# Patient Record
Sex: FEMALE | Race: WHITE | NOT HISPANIC OR LATINO | Employment: OTHER | URBAN - METROPOLITAN AREA
[De-identification: names, ages, dates, MRNs, and addresses within clinical notes are randomized per-mention and may not be internally consistent; named-entity substitution may affect disease eponyms.]

---

## 2017-02-06 ENCOUNTER — HOSPITAL ENCOUNTER (INPATIENT)
Facility: HOSPITAL | Age: 82
LOS: 3 days | Discharge: HOME | DRG: 871 | End: 2017-02-09
Attending: EMERGENCY MEDICINE | Admitting: INTERNAL MEDICINE
Payer: MEDICARE

## 2017-02-06 DIAGNOSIS — D61.818 PANCYTOPENIA: ICD-10-CM

## 2017-02-06 DIAGNOSIS — J18.9 SEPSIS DUE TO PNEUMONIA: ICD-10-CM

## 2017-02-06 DIAGNOSIS — I50.20 SYSTOLIC CONGESTIVE HEART FAILURE: ICD-10-CM

## 2017-02-06 DIAGNOSIS — E86.0 DEHYDRATION: ICD-10-CM

## 2017-02-06 DIAGNOSIS — I48.19 PERSISTENT ATRIAL FIBRILLATION: ICD-10-CM

## 2017-02-06 DIAGNOSIS — I95.9 HYPOTENSION, UNSPECIFIED HYPOTENSION TYPE: ICD-10-CM

## 2017-02-06 DIAGNOSIS — I50.32 CHRONIC DIASTOLIC HEART FAILURE: ICD-10-CM

## 2017-02-06 DIAGNOSIS — R50.9 ACUTE FEBRILE ILLNESS: Primary | ICD-10-CM

## 2017-02-06 DIAGNOSIS — D50.8 IRON DEFICIENCY ANEMIA SECONDARY TO INADEQUATE DIETARY IRON INTAKE: ICD-10-CM

## 2017-02-06 DIAGNOSIS — N17.9 AKI (ACUTE KIDNEY INJURY): ICD-10-CM

## 2017-02-06 DIAGNOSIS — A41.9 SEPSIS DUE TO PNEUMONIA: ICD-10-CM

## 2017-02-06 LAB
ALBUMIN SERPL BCP-MCNC: 3 G/DL
ALP SERPL-CCNC: 86 U/L
ALT SERPL W/O P-5'-P-CCNC: 16 U/L
ANION GAP SERPL CALC-SCNC: 9 MMOL/L
AST SERPL-CCNC: 25 U/L
BASOPHILS # BLD AUTO: 0 K/UL
BASOPHILS NFR BLD: 0 %
BILIRUB SERPL-MCNC: 0.5 MG/DL
BILIRUB UR QL STRIP: NEGATIVE
BUN SERPL-MCNC: 43 MG/DL
CALCIUM SERPL-MCNC: 7.7 MG/DL
CHLORIDE SERPL-SCNC: 102 MMOL/L
CHOLEST/HDLC SERPL: 4 {RATIO}
CLARITY UR: CLEAR
CO2 SERPL-SCNC: 26 MMOL/L
COLOR UR: YELLOW
CREAT SERPL-MCNC: 1.4 MG/DL
CREAT UR-MCNC: 79 MG/DL
DIASTOLIC DYSFUNCTION: YES
DIFFERENTIAL METHOD: ABNORMAL
EOSINOPHIL # BLD AUTO: 0 K/UL
EOSINOPHIL NFR BLD: 0 %
ERYTHROCYTE [DISTWIDTH] IN BLOOD BY AUTOMATED COUNT: 15.2 %
EST. GFR  (AFRICAN AMERICAN): 41 ML/MIN/1.73 M^2
EST. GFR  (NON AFRICAN AMERICAN): 36 ML/MIN/1.73 M^2
ESTIMATED AVG GLUCOSE: 85 MG/DL
ESTIMATED PA SYSTOLIC PRESSURE: 45.25
FERRITIN SERPL-MCNC: 194 NG/ML
FLUAV AG SPEC QL IA: NEGATIVE
FLUBV AG SPEC QL IA: NEGATIVE
FOLATE SERPL-MCNC: 16.4 NG/ML
GLUCOSE SERPL-MCNC: 106 MG/DL
GLUCOSE UR QL STRIP: NEGATIVE
GRAM STN SPEC: NORMAL
HBA1C MFR BLD HPLC: 4.6 %
HCT VFR BLD AUTO: 31.9 %
HDL/CHOLESTEROL RATIO: 25 %
HDLC SERPL-MCNC: 100 MG/DL
HDLC SERPL-MCNC: 25 MG/DL
HGB BLD-MCNC: 10.4 G/DL
HGB UR QL STRIP: NEGATIVE
INR PPP: 2
IRON SERPL-MCNC: 16 UG/DL
KETONES UR QL STRIP: ABNORMAL
LACTATE SERPL-SCNC: 0.6 MMOL/L
LDLC SERPL CALC-MCNC: 50.6 MG/DL
LEUKOCYTE ESTERASE UR QL STRIP: NEGATIVE
LYMPHOCYTES # BLD AUTO: 0.7 K/UL
LYMPHOCYTES NFR BLD: 19.2 %
MAGNESIUM SERPL-MCNC: 2.1 MG/DL
MCH RBC QN AUTO: 31.7 PG
MCHC RBC AUTO-ENTMCNC: 32.6 %
MCV RBC AUTO: 97 FL
MITRAL VALVE REGURGITATION: ABNORMAL
MONOCYTES # BLD AUTO: 0.5 K/UL
MONOCYTES NFR BLD: 12.2 %
NEUTROPHILS # BLD AUTO: 2.5 K/UL
NEUTROPHILS NFR BLD: 68.3 %
NITRITE UR QL STRIP: NEGATIVE
NONHDLC SERPL-MCNC: 75 MG/DL
PH UR STRIP: 6 [PH] (ref 5–8)
PHOSPHATE SERPL-MCNC: 3.9 MG/DL
PLATELET # BLD AUTO: 140 K/UL
PMV BLD AUTO: 9.8 FL
POTASSIUM SERPL-SCNC: 3.9 MMOL/L
PROT SERPL-MCNC: 5.7 G/DL
PROT UR QL STRIP: NEGATIVE
PROTHROMBIN TIME: 21.2 SEC
RBC # BLD AUTO: 3.28 M/UL
RETIRED EF AND QEF - SEE NOTES: 60 (ref 55–65)
SATURATED IRON: 5 %
SODIUM SERPL-SCNC: 137 MMOL/L
SODIUM UR-SCNC: <20 MMOL/L
SP GR UR STRIP: 1.01 (ref 1–1.03)
SPECIMEN SOURCE: NORMAL
TOTAL IRON BINDING CAPACITY: 332 UG/DL
TRANSFERRIN SERPL-MCNC: 224 MG/DL
TRICUSPID VALVE REGURGITATION: ABNORMAL
TRIGL SERPL-MCNC: 122 MG/DL
TSH SERPL DL<=0.005 MIU/L-ACNC: 0.6 UIU/ML
URN SPEC COLLECT METH UR: ABNORMAL
UROBILINOGEN UR STRIP-ACNC: NEGATIVE EU/DL
UUN UR-MCNC: 510 MG/DL
VIT B12 SERPL-MCNC: 209 PG/ML
WBC # BLD AUTO: 3.69 K/UL

## 2017-02-06 PROCEDURE — 87040 BLOOD CULTURE FOR BACTERIA: CPT

## 2017-02-06 PROCEDURE — 25000003 PHARM REV CODE 250: Performed by: STUDENT IN AN ORGANIZED HEALTH CARE EDUCATION/TRAINING PROGRAM

## 2017-02-06 PROCEDURE — 83036 HEMOGLOBIN GLYCOSYLATED A1C: CPT

## 2017-02-06 PROCEDURE — 96366 THER/PROPH/DIAG IV INF ADDON: CPT

## 2017-02-06 PROCEDURE — 99284 EMERGENCY DEPT VISIT MOD MDM: CPT | Mod: 25

## 2017-02-06 PROCEDURE — 63600175 PHARM REV CODE 636 W HCPCS: Performed by: EMERGENCY MEDICINE

## 2017-02-06 PROCEDURE — 96361 HYDRATE IV INFUSION ADD-ON: CPT

## 2017-02-06 PROCEDURE — 93306 TTE W/DOPPLER COMPLETE: CPT

## 2017-02-06 PROCEDURE — 84540 ASSAY OF URINE/UREA-N: CPT

## 2017-02-06 PROCEDURE — 82728 ASSAY OF FERRITIN: CPT

## 2017-02-06 PROCEDURE — 84300 ASSAY OF URINE SODIUM: CPT

## 2017-02-06 PROCEDURE — 85610 PROTHROMBIN TIME: CPT

## 2017-02-06 PROCEDURE — 11000001 HC ACUTE MED/SURG PRIVATE ROOM

## 2017-02-06 PROCEDURE — 25000003 PHARM REV CODE 250: Performed by: INTERNAL MEDICINE

## 2017-02-06 PROCEDURE — 80061 LIPID PANEL: CPT

## 2017-02-06 PROCEDURE — 87086 URINE CULTURE/COLONY COUNT: CPT

## 2017-02-06 PROCEDURE — 85025 COMPLETE CBC W/AUTO DIFF WBC: CPT

## 2017-02-06 PROCEDURE — 96365 THER/PROPH/DIAG IV INF INIT: CPT

## 2017-02-06 PROCEDURE — 87070 CULTURE OTHR SPECIMN AEROBIC: CPT

## 2017-02-06 PROCEDURE — 94761 N-INVAS EAR/PLS OXIMETRY MLT: CPT

## 2017-02-06 PROCEDURE — 83605 ASSAY OF LACTIC ACID: CPT

## 2017-02-06 PROCEDURE — 94640 AIRWAY INHALATION TREATMENT: CPT

## 2017-02-06 PROCEDURE — 82746 ASSAY OF FOLIC ACID SERUM: CPT

## 2017-02-06 PROCEDURE — 80053 COMPREHEN METABOLIC PANEL: CPT

## 2017-02-06 PROCEDURE — 25000242 PHARM REV CODE 250 ALT 637 W/ HCPCS: Performed by: STUDENT IN AN ORGANIZED HEALTH CARE EDUCATION/TRAINING PROGRAM

## 2017-02-06 PROCEDURE — 82607 VITAMIN B-12: CPT

## 2017-02-06 PROCEDURE — 96367 TX/PROPH/DG ADDL SEQ IV INF: CPT

## 2017-02-06 PROCEDURE — 87400 INFLUENZA A/B EACH AG IA: CPT | Mod: 59

## 2017-02-06 PROCEDURE — 84100 ASSAY OF PHOSPHORUS: CPT

## 2017-02-06 PROCEDURE — 81003 URINALYSIS AUTO W/O SCOPE: CPT

## 2017-02-06 PROCEDURE — 82570 ASSAY OF URINE CREATININE: CPT

## 2017-02-06 PROCEDURE — 83735 ASSAY OF MAGNESIUM: CPT

## 2017-02-06 PROCEDURE — 27000221 HC OXYGEN, UP TO 24 HOURS

## 2017-02-06 PROCEDURE — 63600175 PHARM REV CODE 636 W HCPCS: Performed by: STUDENT IN AN ORGANIZED HEALTH CARE EDUCATION/TRAINING PROGRAM

## 2017-02-06 PROCEDURE — 83540 ASSAY OF IRON: CPT

## 2017-02-06 PROCEDURE — 87205 SMEAR GRAM STAIN: CPT

## 2017-02-06 PROCEDURE — 87205 SMEAR GRAM STAIN: CPT | Mod: 91

## 2017-02-06 PROCEDURE — 84443 ASSAY THYROID STIM HORMONE: CPT

## 2017-02-06 PROCEDURE — 25000003 PHARM REV CODE 250: Performed by: EMERGENCY MEDICINE

## 2017-02-06 RX ORDER — CIPROFLOXACIN 2 MG/ML
400 INJECTION, SOLUTION INTRAVENOUS
Status: COMPLETED | OUTPATIENT
Start: 2017-02-06 | End: 2017-02-06

## 2017-02-06 RX ORDER — SODIUM CHLORIDE 9 MG/ML
1000 INJECTION, SOLUTION INTRAVENOUS
Status: COMPLETED | OUTPATIENT
Start: 2017-02-06 | End: 2017-02-06

## 2017-02-06 RX ORDER — ASPIRIN 81 MG/1
81 TABLET ORAL DAILY
Status: DISCONTINUED | OUTPATIENT
Start: 2017-02-06 | End: 2017-02-09 | Stop reason: HOSPADM

## 2017-02-06 RX ORDER — HYDROXYZINE PAMOATE 25 MG/1
25 CAPSULE ORAL ONCE
Status: COMPLETED | OUTPATIENT
Start: 2017-02-06 | End: 2017-02-06

## 2017-02-06 RX ORDER — IPRATROPIUM BROMIDE AND ALBUTEROL SULFATE 2.5; .5 MG/3ML; MG/3ML
3 SOLUTION RESPIRATORY (INHALATION) EVERY 4 HOURS PRN
Status: DISCONTINUED | OUTPATIENT
Start: 2017-02-06 | End: 2017-02-08

## 2017-02-06 RX ORDER — CALCIUM CARBONATE 500(1250)
500 TABLET ORAL
Status: DISCONTINUED | OUTPATIENT
Start: 2017-02-06 | End: 2017-02-09 | Stop reason: HOSPADM

## 2017-02-06 RX ORDER — ACETAMINOPHEN 500 MG
1000 TABLET ORAL
Status: COMPLETED | OUTPATIENT
Start: 2017-02-06 | End: 2017-02-06

## 2017-02-06 RX ORDER — TRAMADOL HYDROCHLORIDE 50 MG/1
50 TABLET ORAL EVERY 8 HOURS PRN
Status: DISCONTINUED | OUTPATIENT
Start: 2017-02-06 | End: 2017-02-09 | Stop reason: HOSPADM

## 2017-02-06 RX ORDER — FUROSEMIDE 10 MG/ML
40 INJECTION INTRAMUSCULAR; INTRAVENOUS ONCE
Status: COMPLETED | OUTPATIENT
Start: 2017-02-06 | End: 2017-02-06

## 2017-02-06 RX ORDER — CIPROFLOXACIN 2 MG/ML
400 INJECTION, SOLUTION INTRAVENOUS
Status: DISCONTINUED | OUTPATIENT
Start: 2017-02-06 | End: 2017-02-06 | Stop reason: DRUGHIGH

## 2017-02-06 RX ORDER — OSELTAMIVIR PHOSPHATE 30 MG/1
30 CAPSULE ORAL DAILY
Status: DISCONTINUED | OUTPATIENT
Start: 2017-02-06 | End: 2017-02-09 | Stop reason: HOSPADM

## 2017-02-06 RX ORDER — BENZONATATE 100 MG/1
100 CAPSULE ORAL 3 TIMES DAILY PRN
Status: DISCONTINUED | OUTPATIENT
Start: 2017-02-06 | End: 2017-02-09 | Stop reason: HOSPADM

## 2017-02-06 RX ORDER — SODIUM CHLORIDE FOR INHALATION 3 %
4 VIAL, NEBULIZER (ML) INHALATION ONCE
Status: DISCONTINUED | OUTPATIENT
Start: 2017-02-06 | End: 2017-02-09 | Stop reason: HOSPADM

## 2017-02-06 RX ORDER — WARFARIN 3 MG/1
3 TABLET ORAL DAILY
Status: DISCONTINUED | OUTPATIENT
Start: 2017-02-06 | End: 2017-02-09 | Stop reason: HOSPADM

## 2017-02-06 RX ORDER — ONDANSETRON 8 MG/1
8 TABLET, ORALLY DISINTEGRATING ORAL EVERY 8 HOURS PRN
Status: DISCONTINUED | OUTPATIENT
Start: 2017-02-06 | End: 2017-02-09 | Stop reason: HOSPADM

## 2017-02-06 RX ORDER — OSELTAMIVIR PHOSPHATE 75 MG/1
75 CAPSULE ORAL 2 TIMES DAILY
Status: DISCONTINUED | OUTPATIENT
Start: 2017-02-06 | End: 2017-02-06 | Stop reason: DRUGHIGH

## 2017-02-06 RX ORDER — CIPROFLOXACIN 2 MG/ML
400 INJECTION, SOLUTION INTRAVENOUS
Status: DISCONTINUED | OUTPATIENT
Start: 2017-02-07 | End: 2017-02-08

## 2017-02-06 RX ADMIN — CIPROFLOXACIN 400 MG: 2 INJECTION, SOLUTION INTRAVENOUS at 06:02

## 2017-02-06 RX ADMIN — SODIUM CHLORIDE 500 ML: 0.9 INJECTION, SOLUTION INTRAVENOUS at 10:02

## 2017-02-06 RX ADMIN — ASPIRIN 81 MG: 81 TABLET, COATED ORAL at 10:02

## 2017-02-06 RX ADMIN — IPRATROPIUM BROMIDE AND ALBUTEROL SULFATE 3 ML: .5; 3 SOLUTION RESPIRATORY (INHALATION) at 08:02

## 2017-02-06 RX ADMIN — HYDROXYZINE PAMOATE 25 MG: 25 CAPSULE ORAL at 07:02

## 2017-02-06 RX ADMIN — FUROSEMIDE 40 MG: 10 INJECTION, SOLUTION INTRAMUSCULAR; INTRAVENOUS at 04:02

## 2017-02-06 RX ADMIN — SODIUM CHLORIDE 1000 ML: 0.9 INJECTION, SOLUTION INTRAVENOUS at 01:02

## 2017-02-06 RX ADMIN — PIPERACILLIN SODIUM AND TAZOBACTAM SODIUM 4.5 G: 4; .5 INJECTION, POWDER, FOR SOLUTION INTRAVENOUS at 06:02

## 2017-02-06 RX ADMIN — PIPERACILLIN SODIUM AND TAZOBACTAM SODIUM 4.5 G: 4; .5 INJECTION, POWDER, FOR SOLUTION INTRAVENOUS at 07:02

## 2017-02-06 RX ADMIN — SODIUM CHLORIDE 1000 ML: 0.9 INJECTION, SOLUTION INTRAVENOUS at 06:02

## 2017-02-06 RX ADMIN — CALCIUM 500 MG: 500 TABLET ORAL at 04:02

## 2017-02-06 RX ADMIN — SODIUM CHLORIDE 1000 ML: 0.9 INJECTION, SOLUTION INTRAVENOUS at 03:02

## 2017-02-06 RX ADMIN — OSELTAMIVIR PHOSPHATE 30 MG: 30 CAPSULE ORAL at 12:02

## 2017-02-06 RX ADMIN — TRAMADOL HYDROCHLORIDE 50 MG: 50 TABLET, COATED ORAL at 04:02

## 2017-02-06 RX ADMIN — WARFARIN SODIUM 3 MG: 3 TABLET ORAL at 04:02

## 2017-02-06 RX ADMIN — VANCOMYCIN HYDROCHLORIDE 1000 MG: 1 INJECTION, POWDER, LYOPHILIZED, FOR SOLUTION INTRAVENOUS at 08:02

## 2017-02-06 RX ADMIN — ACETAMINOPHEN 1000 MG: 500 TABLET ORAL at 03:02

## 2017-02-06 RX ADMIN — BENZONATATE 100 MG: 100 CAPSULE ORAL at 04:02

## 2017-02-06 NOTE — PT/OT/SLP PROGRESS
Physical Therapy      Jazmín Bishop  MRN: [id number]    Patient not seen today secondary to Patient unwilling to participate, Patient fatigue. Will follow-up 2/6/2017.    Avery Kwan, PT

## 2017-02-06 NOTE — ED PROVIDER NOTES
Encounter Date: 2/6/2017       History     Chief Complaint   Patient presents with    Abdominal Pain     sent from [facility] for eval of abdominal distention with an episode of vomting yesterday. also has had cough. reported fever x2 days.     Cough     Review of patient's allergies indicates:   Allergen Reactions    Codeine sulfate Other (See Comments)     CONFUSION       HPI Comments: 81F resident of [facility] was brought in by EMS for fever.  Pt states she thought she had fever x 2 days but they kept telling her she didn't.  Tonight they woke her up and told her she was going to the ER b/c she had fever.  She has no complaints.  She has a very wet mucusy cough.  She denies abd pain, n/v/d or dysuria.  She states she wears a diaper b/c she always has to urinate.  She is hard of hearing.  Paperwork sent with pt states she is DNR.    The history is provided by the patient.     Past Medical History   Diagnosis Date    Arthritis     Cataract     CHF (congestive heart failure)     Chicken pox     Hypertension     Ulcer      Past Medical History Pertinent Negatives   Diagnosis Date Noted    Basal cell carcinoma 8/27/2015    Melanoma 8/27/2015    Skin disease 8/27/2015    Squamous cell carcinoma 8/27/2015     Past Surgical History   Procedure Laterality Date    Pylorplasty      Hysterectomy      Cholecystectomy      Small intestine surgery      Stomach surgery      Eye surgery      Spine surgery       x2     Family History   Problem Relation Age of Onset    Kidney disease Mother     No Known Problems Father      Social History   Substance Use Topics    Smoking status: Never Smoker    Smokeless tobacco: Never Used    Alcohol use No     Review of Systems   Constitutional: Positive for fever.   Respiratory: Positive for cough. Negative for shortness of breath.    Cardiovascular: Negative for chest pain.   Gastrointestinal: Negative for abdominal pain, diarrhea, nausea and vomiting.    Genitourinary: Positive for frequency. Negative for dysuria.   All other systems reviewed and are negative.      Physical Exam   Initial Vitals   BP Pulse Resp Temp SpO2   02/06/17 0047 02/06/17 0047 02/06/17 0047 02/06/17 0047 02/06/17 0047   74/45 90 24 99.8 °F (37.7 °C) 94 %     Physical Exam    Nursing note and vitals reviewed.  Constitutional: She is cooperative.   Elderly lady, alert, awake and in no acute distress   HENT:   Head: Normocephalic and atraumatic.   Mouth/Throat: Mucous membranes are dry.   Neck: Normal range of motion.   Cardiovascular: Normal rate, regular rhythm and normal heart sounds.   No murmur heard.  Pulmonary/Chest: No respiratory distress. She has rhonchi.   Coarse breath sounds with wet cough   Abdominal: Soft. Bowel sounds are normal. She exhibits no distension. There is no tenderness.   Musculoskeletal: Normal range of motion.   Neurological: She is alert. She has normal strength.   Skin: Skin is warm and dry. No rash noted.   Psychiatric: She has a normal mood and affect. Her behavior is normal.         ED Course   Procedures  Labs Reviewed   CBC W/ AUTO DIFFERENTIAL - Abnormal; Notable for the following:        Result Value    WBC 3.69 (*)     RBC 3.28 (*)     Hemoglobin 10.4 (*)     Hematocrit 31.9 (*)     MCH 31.7 (*)     RDW 15.2 (*)     Platelets 140 (*)     Lymph # 0.7 (*)     All other components within normal limits   COMPREHENSIVE METABOLIC PANEL - Abnormal; Notable for the following:     BUN, Bld 43 (*)     Calcium 7.7 (*)     Total Protein 5.7 (*)     Albumin 3.0 (*)     eGFR if  41 (*)     eGFR if non  36 (*)     All other components within normal limits   URINALYSIS - Abnormal; Notable for the following:     Ketones, UA Trace (*)     All other components within normal limits   CULTURE, URINE   CULTURE, BLOOD   CULTURE, BLOOD   CULTURE, URINE   INFLUENZA A AND B ANTIGEN   LACTIC ACID, PLASMA          X-Rays:   Independently Interpreted  Readings:   Other Readings:  CXR - no pneumonia    Medical Decision Making:   History:   I obtained history from: someone other than patient and EMS provider.  Independently Interpreted Test(s):   I have ordered and independently interpreted X-rays - see prior notes.  Clinical Tests:   Lab Tests: Ordered and Reviewed  Radiological Study: Ordered and Reviewed  ED Management:  [facility] resident with fever.  Does not look toxic.  Alert, awake and appropriate.  No infection on exam.  Hypotensive but not tachycardic.  No AMS.  Pt given IVFs with improvement in BP.      No flu, no UTI, no pneumonia on CXR.  Labs show slightly low WBC ct, anemia but on higher end of her normal.  Low platelets.  No signs of dehydration or NGUYEN.  Normal liver enzymes.  Normal lactate.    I discussed placing central line and starting pressors - pt declines this but approves IVFs and IV abx.  She was given vanc/zosyn/cipro for fever without a source (from NH).    Will admit to [facility].  Other:   I have discussed this case with another health care provider.                   ED Course     Clinical Impression:   The primary encounter diagnosis was Acute febrile illness. Diagnoses of Dehydration and Hypotension, unspecified hypotension type were also pertinent to this visit.          Carolina Velasco MD  02/06/17 0621       Carolina Velasco MD  02/06/17 0622

## 2017-02-06 NOTE — PLAN OF CARE
Provided information on Power of  (Mr. MELISA West; Phone: [phone]). Called number provided but no answer; left message asking Mr. West to call back to nursing station by patient's room ([phone]) and to leave number and time of day best to reach.    Spoke with patient about code status, and verified with patient that she wants DNR and DNI status. When asked about want of pressors if needed for hypotension, pt stated she would only want those if absolutely necessary. Pt showed understanding of procedure and could explain procedure back when asked what it would entail. At the same time, she would still like to have the conversation with POA about the option of pressors.    UPDATE:  Contacted Mr. MELISA West (Cell: [phone]  above number is his home number) and went over code status with him and patient. Patient remains DNR/DNI with limitations:  NO intubation, mechanical ventilation, chest compressions  YES intravenous pressors  YES defibrillator and external pacing    Official DNR paperwork in patients chart.    Daniele Maloney MD  [facility] Medicine Team A  [facility] - [facility]

## 2017-02-06 NOTE — IP AVS SNAPSHOT
[facility]  [address]  [place] [address]  Phone: [phone]           Patient Discharge Instructions     Our goal is to set you up for success. This packet includes information on your condition, medications, and your home care. It will help you to care for yourself so you don't get sicker and need to go back to the hospital.     Please ask your nurse if you have any questions.        There are many details to remember when preparing to leave the hospital. Here is what you will need to do:    1. Take your medicine. If you are prescribed medications, review your Medication List in the following pages. You may have new medications to  at the pharmacy and others that you'll need to stop taking. Review the instructions for how and when to take your medications. Talk with your doctor or nurses if you are unsure of what to do.     2. Go to your follow-up appointments. Specific follow-up information is listed in the following pages. Your may be contacted by a transition nurse or clinical provider about future appointments. Be sure we have all of the phone numbers to reach you, if needed. Please contact your provider's office if you are unable to make an appointment.     3. Watch for warning signs. Your doctor or nurse will give you detailed warning signs to watch for and when to call for assistance. These instructions may also include educational information about your condition. If you experience any of warning signs to your health, call your doctor.               ** Verify the list of medication(s) below is accurate and up to date. Carry this with you in case of emergency. If your medications have changed, please notify your healthcare provider.             Medication List      START taking these medications        Additional Info                      cyanocobalamin 1000 MCG tablet   Commonly known as:  VITAMIN B-12   Quantity:  90 tablet   Refills:  3   Dose:  1000 mcg    Last time this  was given:  1,000 mcg on 2/7/2017  9:12 AM   Instructions:  Take 1 tablet (1,000 mcg total) by mouth once daily.     Begin Date    AM    Noon    PM    Bedtime       ferrous sulfate 325 (65 FE) MG EC tablet   Quantity:  270 tablet   Refills:  3   Dose:  325 mg    Instructions:  Take 1 tablet (325 mg total) by mouth 3 (three) times daily with meals.     Begin Date    AM    Noon    PM    Bedtime       moxifloxacin 400 mg tablet   Commonly known as:  AVELOX   Quantity:  3 tablet   Refills:  0   Dose:  400 mg    Instructions:  Take 1 tablet (400 mg total) by mouth once daily.     Begin Date    AM    Noon    PM    Bedtime       oseltamivir 30 MG capsule   Commonly known as:  TAMIFLU   Quantity:  1 capsule   Refills:  0   Dose:  30 mg    Last time this was given:  30 mg on 2/9/2017  8:37 AM   Instructions:  Take 1 capsule (30 mg total) by mouth once daily.     Begin Date    AM    Noon    PM    Bedtime         CONTINUE taking these medications        Additional Info                      aspirin 81 MG EC tablet   Commonly known as:  ECOTRIN   Refills:  0   Dose:  81 mg    Last time this was given:  81 mg on 2/9/2017  8:37 AM   Instructions:  Take 81 mg by mouth once daily.     Begin Date    AM    Noon    PM    Bedtime       carvedilol 25 MG tablet   Commonly known as:  COREG   Quantity:  180 tablet   Refills:  4   Dose:  25 mg    Last time this was given:  25 mg on 2/9/2017  8:37 AM   Instructions:  Take 1 tablet (25 mg total) by mouth 2 (two) times daily with meals.     Begin Date    AM    Noon    PM    Bedtime       ferrous gluconate 324 MG tablet   Commonly known as:  FERGON   Quantity:  90 tablet   Refills:  3   Dose:  324 mg    Instructions:  Take 1 tablet (324 mg total) by mouth 3 (three) times daily with meals.     Begin Date    AM    Noon    PM    Bedtime       furosemide 40 MG tablet   Commonly known as:  LASIX   Quantity:  30 tablet   Refills:  6   Dose:  40 mg    Last time this was given:  40 mg on 2/9/2017  8:37  AM   Instructions:  Take 1 tablet (40 mg total) by mouth once daily.     Begin Date    AM    Noon    PM    Bedtime       hydrOXYzine pamoate 25 MG Cap   Commonly known as:  VISTARIL   Quantity:  30 capsule   Refills:  3   Dose:  25 mg    Last time this was given:  25 mg on 2/6/2017  7:27 PM   Instructions:  Take 1 capsule (25 mg total) by mouth nightly as needed.     Begin Date    AM    Noon    PM    Bedtime       lisinopril 20 MG tablet   Commonly known as:  PRINIVIL,ZESTRIL   Quantity:  180 tablet   Refills:  5   Dose:  40 mg    Instructions:  Take 2 tablets (40 mg total) by mouth nightly.     Begin Date    AM    Noon    PM    Bedtime       methocarbamol 500 MG Tab   Commonly known as:  ROBAXIN   Quantity:  60 tablet   Refills:  3   Dose:  500 mg    Instructions:  Take 1 tablet (500 mg total) by mouth 2 (two) times daily as needed.     Begin Date    AM    Noon    PM    Bedtime       promethazine 25 MG tablet   Commonly known as:  PHENERGAN   Quantity:  30 tablet   Refills:  3   Dose:  25 mg    Instructions:  Take 1 tablet (25 mg total) by mouth daily as needed for Nausea.     Begin Date    AM    Noon    PM    Bedtime       sucralfate 1 gram tablet   Commonly known as:  CARAFATE   Refills:  0   Dose:  1 g    Instructions:  Take 1 g by mouth 2 (two) times daily.     Begin Date    AM    Noon    PM    Bedtime       tramadol 50 mg tablet   Commonly known as:  ULTRAM   Refills:  0   Dose:  50 mg    Last time this was given:  50 mg on 2/9/2017  3:00 AM   Instructions:  Take 50 mg by mouth every 8 (eight) hours as needed for Pain.     Begin Date    AM    Noon    PM    Bedtime       ursodiol 300 mg capsule   Commonly known as:  ACTIGALL   Quantity:  60 capsule   Refills:  11   Dose:  300 mg    Instructions:  Take 1 capsule (300 mg total) by mouth 2 (two) times daily.     Begin Date    AM    Noon    PM    Bedtime       warfarin 3 MG tablet   Commonly known as:  COUMADIN   Quantity:  30 tablet   Refills:  6   Dose:  3 mg     Last time this was given:  3 mg on 2/8/2017  5:28 PM   Instructions:  Take 1 tablet (3 mg total) by mouth Daily.     Begin Date    AM    Noon    PM    Bedtime            Where to Get Your Medications      You can get these medications from any pharmacy     Bring a paper prescription for each of these medications     cyanocobalamin 1000 MCG tablet    ferrous sulfate 325 (65 FE) MG EC tablet    moxifloxacin 400 mg tablet    oseltamivir 30 MG capsule                  Please bring to all follow up appointments:    1. A copy of your discharge instructions.  2. All medicines you are currently taking in their original bottles.  3. Identification and insurance card.    Please arrive 15 minutes ahead of scheduled appointment time.    Please call 24 hours in advance if you must reschedule your appointment and/or time.        Follow-up Information     Follow up with Luis Butcher MD In 1 week.    Specialty:  Family Medicine    Why:  For follow up from recent hospitalization    Contact information:    [address] [place] [address] [phone]          Discharge Instructions     Future Orders    Activity as tolerated     Call MD for:  difficulty breathing or increased cough     Call MD for:  increased confusion or weakness     Call MD for:  persistent dizziness, light-headedness, or visual disturbances     Call MD for:  persistent nausea and vomiting or diarrhea     Call MD for:  redness, tenderness, or signs of infection (pain, swelling, redness, odor or green/yellow discharge around incision site)     Call MD for:  severe persistent headache     Call MD for:  severe uncontrolled pain     Call MD for:  temperature >100.4     Call MD for:  worsening rash     Diet Cardiac     Scheduling Instructions:    Soft diet    Diet general     Questions:    Total calories:      Fat restriction, if any:      Protein restriction, if any:      Na restriction, if any:      Fluid restriction:      Additional restrictions:           Discharge Instructions       SEPSIS, UNDERSTANDING (ENGLISH) View Edit Remove  SEPSIS (ENGLISH) View Edit Remove  ADULT, PNEUMONIA (ENGLISH) View Edit Remove  PNEUMONIA, WHAT IS (ENGLISH) View Edit Remove  HEART FAILURE, COPING WITH (ENGLISH) View Edit Remove  HEART FAILURE, DISCHARGE INSTRUCTIONS FOR (ENGLISH) View Edit Remove  HEART FAILURE, WHAT IS (ENGLISH) View Edit Remove  HEART FAILURE: BEING ACTIVE (ENGLISH) View Edit Remove  HEART FAILURE: EVALUATING YOUR HEART (ENGLISH) View Edit Remove  HEART FAILURE: MAKING CHANGES TO YOUR DIET (ENGLISH) View Edit Remove  HEART FAILURE: MEDICINES TO HELP YOUR HEART (ENGLISH) View Edit Remove  HEART FAILURE: PROCEDURES THAT MAY HELP (ENGLISH) View Edit Remove  HEART FAILURE: WARNING SIGNS OF A FLARE-UP (ENGLISH) View Edit Remove  HEART FAILURE: TRACKING YOUR WEIGHT (ENGLISH) View Edit Remove  HEART FAILURE: TAKING MEDICATION TO CONTROL (ENGLISH) View Edit Remove  MOXIFLOXACIN TABLETS (ENGLISH) View Edit Remove  OSELTAMIVIR CAPSULES (ENGLISH) View Edit Remove  CYANOCOBALAMIN, PYRIDOXINE, AND FOLATE (ENGLISH) View Edit Remove  IRON TABLETS, CAPSULES, EXTENDED-RELEASE TABLETS (ENGLISH) View Edit Remove        Discharge References/Attachments     SEPSIS, UNDERSTANDING (ENGLISH)    SEPSIS (ENGLISH)    ADULT, PNEUMONIA (ENGLISH)    PNEUMONIA, WHAT IS (ENGLISH)    HEART FAILURE, COPING WITH (ENGLISH)    HEART FAILURE, DISCHARGE INSTRUCTIONS FOR (ENGLISH)    HEART FAILURE, WHAT IS (ENGLISH)    HEART FAILURE: BEING ACTIVE (ENGLISH)    HEART FAILURE: EVALUATING YOUR HEART (ENGLISH)    HEART FAILURE: MAKING CHANGES TO YOUR DIET (ENGLISH)    HEART FAILURE: MEDICINES TO HELP YOUR HEART (ENGLISH)    HEART FAILURE: PROCEDURES THAT MAY HELP (ENGLISH)    HEART FAILURE: WARNING SIGNS OF A FLARE-UP (ENGLISH)    HEART FAILURE: TRACKING YOUR WEIGHT (ENGLISH)    HEART FAILURE: TAKING MEDICATION TO CONTROL  (ENGLISH)    MOXIFLOXACIN TABLETS (ENGLISH)    OSELTAMIVIR CAPSULES (ENGLISH)     "CYANOCOBALAMIN, PYRIDOXINE, AND FOLATE (ENGLISH)    IRON TABLETS, CAPSULES, EXTENDED-RELEASE TABLETS (ENGLISH)        Primary Diagnosis     Your primary diagnosis was:  Sepsis Due To Pneumonia      Admission Information     Date & Time Provider Department CSN    2/6/2017 12:46 AM Benjamín Lopez MD [facility] [id number]      Care Providers     Provider Role Specialty Primary office phone    Benjamín Lopez MD Attending Provider Internal Medicine [phone]    Xochitl Ndiaye MD Team Attending  Internal Medicine [phone]    Benjamín Lopez MD Team Attending  Internal Medicine [phone]      Your Vitals Were     BP Pulse Temp Resp Height Weight    118/73 (BP Location: Right arm, Patient Position: Lying, BP Method: Automatic) 66 97.7 °F (36.5 °C) (Oral) 18 4' 10" (1.473 m) 45.9 kg (101 lb 2 oz)    Last Period SpO2 BMI          (LMP Unknown) 97% 21.14 kg/m2        Recent Lab Values        5/15/2016 2/6/2017                        6:08 AM  1:10 AM          A1C 4.8 4.6          Comment for A1C at  1:10 AM on 2/6/2017:  According to ADA guidelines, hemoglobin A1C <7.0% represents  optimal control in non-pregnant diabetic patients.  Different  metrics may apply to specific populations.   Standards of Medical Care in Diabetes - 2016.  For the purpose of screening for the presence of diabetes:  <5.7%     Consistent with the absence of diabetes  5.7-6.4%  Consistent with increasing risk for diabetes   (prediabetes)  >or=6.5%  Consistent with diabetes  Currently no consensus exists for use of hemoglobin A1C  for diagnosis of diabetes for children.        Pending Labs     Order Current Status    Blood culture In process    Blood culture Preliminary result      Allergies as of 2/9/2017        Reactions    Codeine Sulfate Other (See Comments)    CONFUSION      [facility]     [facility] Nurse Care Line - 24/7 Assistance  Unless otherwise directed by your provider, please contact [facility], " our nurse care line that is available for 24/7 assistance.     Registered nurses in the [facility] provide clinical advisement, health education, appointment booking, and other advisory services.  Call for this free service at [phone].        Advance Directives     An advance directive is a document which, in the event you are no longer able to make decisions for yourself, tells your healthcare team what kind of treatment you do or do not want to receive, or who you would like to make those decisions for you.  If you do not currently have an advance directive, [facility] encourages you to create one.  For more information call:  [phone]), [phone] ([phone]),  or log on to [email or web address].        Language Assistance Services     ATTENTION: Language assistance services are available, free of charge. Please call [phone].      ATENCIÓN: Si habla español, tiene a villalobos disposición servicios gratuitos de asistencia lingüística. Llame al [phone].     CHÚ Ý: N?u b?n nói Ti?ng Vi?t, có các d?ch v? h? tr? ngôn ng? mi?n phí dành cho b?n. G?i s? [phone].        Heart Failure Education       Heart Failure: Being Active  You have a condition called heart failure. Being active doesnt mean that you have to wear yourself out. Even a little movement each day helps to strengthen your heart. If you cant get out to exercise, you can do simple stretching and strengthening exercises at home. These are good ways to keep you well-conditioned and prevent you and your heart from becoming excessively weak.    Ideas to get you started  · Add a little movement to things you do now. Walk to mail letters. Park your car at the far end of the parking lot and walk to the store. Walk up a flight of stairs instead of taking the elevator.  · Choose activities you enjoy. You might walk, swim, or ride an exercise bike. Things like gardening and washing the car count, too. Other  possibilities include: washing dishes, walking the dog, walking around the mall, and doing aerobic activities with friends.  · Join a group exercise program at a [facility] or [facility], a senior center, or a community center. Or look into a hospital cardiac rehabilitation program. Ask your doctor if you qualify.  Tips to keep you going  · Get up and get dressed each day. Go to a coffee shop and read a newspaper or go somewhere that you'll be in the presence of other active people. Youll feel more like being active.  · Make a plan. Choose one or more activities that you enjoy and that you can easily do. Then plan to do at least one each day. You might write your plan on a calendar.  · Go with a friend or a group if you like company. This can help you feel supported and stay motivated, too.  · Plan social events that you enjoy. This will keep you mentally engaged as well as physically motivated to do things you find pleasure in.  For your safety  · Talk with your healthcare provider before starting an exercise program.  · Exercise indoors when its too hot or too cold outside, or when the air quality is poor. Try walking at a shopping mall.  · Wear socks and sturdy shoes to maintain your balance and prevent falls.  · Start slowly. Do a few minutes several times a day at first. Increase your time and speed little by little.  · Stop and rest whenever you feel tired or get short of breath.  · Dont push yourself on days when you dont feel well.  Date Last Reviewed: 3/20/2016  © 3138-4038 [employer]. [address] [place] [address], [place] [address]. All rights reserved. This information is not intended as a substitute for professional medical care. Always follow your healthcare professional's instructions.              Heart Failure: Evaluating Your Heart  You have a condition called heart failure. To evaluate your condition, your doctor will examine you, ask questions, and do some tests. Along with looking for signs of  heart failure, the doctor looks for any other health problems that may have led to heart failure. The results of your evaluation will help your doctor form a treatment plan.  Health history and physical exam  Your visit will start with a health history. Tell the doctor about any symptoms youve noticed and about all medicines you take. Then youll have a physical exam. This includes listening to your heartbeat and breathing. Youll also be checked for swelling (edema) in your legs and neck. When you have fluid buildup or fluid in the lungs, it may be called congestive heart failure.  Diagnosing heart failure     During an echocardiogram, sound waves bounce off the heart. These are converted into a picture on the screen.   The following may be done to help your doctor form a diagnosis:  · X-rays show the size and shape of your heart. These pictures can also show fluid in your lungs.  · An electrocardiogram (ECG or EKG) shows the pattern of your heartbeat. Small pads (electrodes) are placed on your chest, arms, and legs. Wires connect the pads to the ECG machine, which records your hearts electrical signals. This can give the doctor information about heart function.  · An echocardiogram uses ultrasound waves to show the structure and movement of your heart muscle. This shows how well the heart pumps. It also shows the thickness of the heart walls, and if the heart is enlarged. It is one of the most useful, non-invasive tests as it provides information about the heart's general function. This helps your doctor make treatment decisions.  · Lab tests evaluate small amounts of blood or urine for signs of problems. A BNP lab test can help diagnose and evaluate heart failure. BNP stands for B-type natriuretic peptide. The ventricles secrete more BNP when heart failure worsens. Lab tests can also provide information about metabolic dysfunction or heart dysfunction.  Your treatment plan  Based on the results of your  evaluation and tests, your doctor will develop a treatment plan. This plan is designed to relieve some of your heart failure symptoms and help make you more comfortable. Your treatment plan may include:  · Medicine to help your heart work better and improve your quality of life  · Changes in what you eat and drink to help prevent fluid from backing up in your body  · Daily monitoring of your weight and heart failure symptoms to see how well your treatment plan is working  · Exercise to help you stay healthy  · Help with quitting smoking  · Emotional and psychological support to help adjust to the changes  · Referrals to other specialists to make sure you are being treated comprehensively  Date Last Reviewed: 3/21/2016  © 1944-0702 [employer]. [address] [place] [address], [place] [address]. All rights reserved. This information is not intended as a substitute for professional medical care. Always follow your healthcare professional's instructions.              Heart Failure: Making Changes to Your Diet  You have a condition called heart failure. When you have heart failure, excess fluid is more likely to build up in your body because your heart isn't working well. This makes the heart work harder to pump blood. Fluid buildup causes symptoms such as shortness of breath and swelling (edema). This is often referred to as congestive heart failure or CHF. Controlling the amount of salt (sodium) you eat may help stop fluid from building up. Your doctor may also tell you to reduce the amount of fluid you drink.  Reading food labels    Your healthcare provider will tell you how much sodium you can eat each day. Read food labels to keep track. Keep in mind that certain foods are high in salt. These include canned, frozen, and processed foods. Check the amount of sodium in each serving. Watch out for high-sodium ingredients. These include MSG (monosodium glutamate), baking soda, and sodium phosphate.   Eating less  salt  Give yourself time to get used to eating less salt. It may take a little while. Here are some tips to help:  · Take the saltshaker off the table. Replace it with salt-free herb mixes and spices.  · Eat fresh or plain frozen vegetables. These have much less salt than canned vegetables.  · Choose low-sodium snacks like sodium-free pretzels, crackers, or air-popped popcorn.  · Dont add salt to your food when youre cooking. Instead, season your foods with pepper, lemon, garlic, or onion.  · When you eat out, ask that your food be cooked without added salt.  · Avoid eating fried foods as these often have a great deal of salt.  If youre told to limit fluids  You may need to limit how much fluid you have to help prevent swelling. This includes anything that is liquid at room temperature, such as ice cream and soup. If your doctor tells you to limit fluid, try these tips:  · Measure drinks in a measuring cup before you drink them. This will help you meet daily goals.  · Chill drinks to make them more refreshing.  · Suck on frozen lemon wedges to quench thirst.  · Only drink when youre thirsty.  · Chew sugarless gum or suck on hard candy to keep your mouth moist.  · Weigh yourself daily to know if your body's fluid content is rising.  My sodium goal  Your healthcare provider may give you a sodium goal to meet each day. This includes sodium found in food as well as salt that you add. My goal is to eat no more than ___________ mg of sodium per day.     When to call your doctor  Call your doctor right away if you have any symptoms of worsening heart failure. These can include:  · Sudden weight gain  · Increased swelling of your legs or ankles  · Trouble breathing when youre resting or at night  · Increase in the number of pillows you have to sleep on  · Chest pain, pressure, discomfort, or pain in the jaw, neck, or back   Date Last Reviewed: 3/21/2016  © 6275-4302 [employer]. [address] [place] [address],  [place] [address]. All rights reserved. This information is not intended as a substitute for professional medical care. Always follow your healthcare professional's instructions.              Heart Failure: Medicines to Help Your Heart    You have a condition called heart failure (also known as congestive heart failure, or CHF). Your doctor will likely prescribe medicines for heart failure and any underlying health problems you have. Most heart failure patients take one or more types of medicinen. Your healthcare provider will work to find the combination of medicines that works best for you.  Heart failure medicines  Here are the most common heart failure medicines:  · ACE inhibitors lower blood pressure and decrease strain on the heart. This makes it easier for the heart to pump. Angiotensin receptor blockers have similar effects. These are prescribed for some patients instead of ACE inhibitors.  · Beta-blockers relieve stress on the heart. They also improve symptoms. They may also improve the heart's pumping action over time.  · Diuretics (also called water pills) help rid your body of excess water. This can help rid your body of swelling (edema). Having less fluid to pump means your heart doesnt have to work as hard. Some diuretics make your body lose a mineral called potassium. Your doctor will tell you if you need to take supplements or eat more foods high in potassium.  · Digoxin helps your heart pump with more strength. This helps your heart pump more blood with each beat. So, more oxygen-rich blood travels to the rest of the body.  · Aldosterone antagonists help alter hormones and decrease strain on the heart.  · Hydralazine and nitrates are two separate medicines used together to treat heart failure. They may come in one combination pill. They lower blood pressure and decrease how hard the heart has to pump.  Medicines for related conditions  Controlling other heart problems helps keep heart failure  under control, too. Depending on other heart problems you have, medicines may be prescribed to:  · Lower blood pressure (antihypertensives).  · Lower cholesterol levels (statins).  · Prevent blood clots (anticoagulants or aspirin).  · Keep the heartbeat steady (antiarrhythmics).  Date Last Reviewed: 3/5/2016  © 9054-0951 [employer]. [address] [place] [address]. All rights reserved. This information is not intended as a substitute for professional medical care. Always follow your healthcare professional's instructions.              Heart Failure: Procedures That May Help    The heart is a muscle that pumps oxygen-rich blood to all parts of the body. When you have heart failure, the heart is not able to pump as well as it should. Blood and fluid may back up into the lungs (congestive heart failure), and some parts of the body dont get enough oxygen-rich blood to work normally. These problems lead to the symptoms of heart failure.     Certain procedures may help the heart pump better in some cases of heart failure. Some procedures are done to treat health problems that may have caused the heart failure such as coronary artery disease or heart rhythm problems. For more serious heart failure, other options are available.  Treating artery and valve problems  If you have coronary artery disease or valve disease, procedures may be done to improve blood flow. This helps the heart pump better, which can improve heart failure symptoms. First, your doctor may do a cardiac catheterization to help detect clogged blood vessels or valve damage. During this procedure, a  thin tube (catheter) in inserted into a blood vessel and guided to the heart. There a dye is injected and a special type of X-ray (angiogram) is taken of the blood vessels. Procedures to open a blocked artery or fix damaged valves can also be done using catheterization.  · Angioplasty uses a balloon-tipped instrument at the end of the  catheter. The balloon is inflated to widen the narrowed artery. In many cases, a stent is expanded to further support the narrowed artery. A stent is a metal mesh tube.  · Valve surgery repairs or replacement of faulty valves can also be done during catheterization so blood can flow properly through the chambers of the heart.  Bypass surgery is another option to help treat blocked arteries. It uses a healthy blood vessel from elsewhere in the body. The healthy blood vessel is attached above and below the blocked area so that blood can flow around the blocked artery.  Treating heart rhythm problems  A device may be placed in the chest to help a weak heart maintain a healthy, heartbeat so the heart can pump more effectively:  · Pacemaker. A pacemaker is an implanted device that regulates your heartbeat electronically. It monitors your heart's rhythm and generates a painless electric impulse that helps the heart beat in a regular rhythm. A pacemaker is programmed to meet your specific heart rhythm needs.  · Biventricular pacing/cardiac resynchronization therapy. A type of pacemaker that paces both pumping chambers of the heart at the same time to coordinate contractions and to improve the heart's function. Some people with heart failure are candidates for this therapy.  · Implantable cardioverter defibrillator. A device similar to a pacemaker that senses when the heart is beating too fast and delivers an electrical shock to convert the fast rhythm to a normal rhythm. This can be a life saving device.  In severe cases  In more serious cases of heart failure when other treatments no longer work, other options may include:  · Ventricular assist devices (VADs). These are mechanical devices used to take over the pumping function for one or both of the heart's ventricles, or pumping chambers. A VAD may be necessary when heart failure progresses to the point that medicines and other treatments no longer help. In some cases, a  VAD may be used as a bridge to transplant.  · Heart transplant. This is replacing the diseased heart with a healthy one from a donor. This is an option for a few people who are very sick. A heart transplant is very serious and not an option for all patients. Your doctor can tell you more.  Date Last Reviewed: 3/20/2016  © 8821-7200 [employer]. [address] [place] [address], [place] [address]. All rights reserved. This information is not intended as a substitute for professional medical care. Always follow your healthcare professional's instructions.              Heart Failure: Tracking Your Weight  You have a condition called heart failure. When you have heart failure, a sudden weight gain or a steady rise in weight is a warning sign that your body is retaining too much water and salt. This could mean your heart failure is getting worse. If left untreated, it can cause problems for your lungs and result in shortness of breath. Weighing yourself each day is the best way to know if youre retaining water. If your weight goes up quickly, call your doctor. You will be given instructions on how to get rid of the excess water. You will likely need medicines and to avoid salt. This will help your heart work better.  Call your doctor if you gain more than 2 pounds in 1 day, more than 5 pounds in 1 week, or whatever weight gain you were told to report by your doctor. This is often a sign of worsening heart failure and needs to be evaluated and treated. Your doctor will tell you what to do next.   Tips for weighing yourself    · Weigh yourself at the same time each morning, wearing the same clothes. Weigh yourself after urinating and before eating.  · Use the same scale each day. Make sure the numbers are easy to read. Put the scale on a flat, hard surface -- not on a rug or carpet.  · Do not stop weighing yourself. If you forget one day, weigh again the next morning.  How to use your weight chart  · Keep your weight  chart near the scale. Write your weight on the chart as soon as you get off the scale.  · Fill in the month and the start date on the chart. Then write down your weight each day. Your chart will look like this:    · If you miss a day, leave the space blank. Weigh yourself the next day and write your weight in the next space.  · Take your weight chart with you when you go to see your doctor.  Date Last Reviewed: 3/20/2016  © 4518-0605 [employer] [address] [place] [address], [place] [address]. All rights reserved. This information is not intended as a substitute for professional medical care. Always follow your healthcare professional's instructions.              Heart Failure: Warning Signs of a Flare-Up  You have a condition called heart failure. Once you have heart failure, flare-ups can happen. Below are signs that can mean your heart failure is getting worse. If you notice any of these warning signs, call your healthcare provider.  Swelling    · Your feet, ankles, or lower legs get puffier.  · You notice skin changes on your lower legs.  · Your shoes feel too tight.  · Your clothes are tighter in the waist.  · You have trouble getting rings on or off your fingers.  Shortness of breath  · You have to breathe harder even when youre doing your normal activities or when youre resting.  · You are short of breath walking up stairs or even short distances.  · You wake up at night short of breath or coughing.  · You need to use more pillows or sit up to sleep.  · You wake up tired or restless.  Other warning signs  · You feel weaker, dizzy, or more tired.  · You have chest pain or changes in your heartbeat.  · You have a cough that wont go away.  · You cant remember things or dont feel like eating.  Tracking your weight  Gaining weight is often the first warning sign that heart failure is getting worse. Gaining even a few pounds can be a sign that your body is retaining excess water and salt. Weighing  yourself each day in the morning after you urinate and before you eat, is the best way to know if you're retaining water. Get a scale that is easy to read and make sure you wear the same clothes and use the same scale every time you weigh. Your healthcare provider will show you how to track your weight. Call your doctor if you gain more than 2 pounds in 1 day, 5 pounds in 1 week, or whatever weight gain you were told to report by your doctor. This is often a sign of worsening heart failure and needs to be evaluated and treated before it compromises your breathing. Your doctor will tell you what to do next.    Date Last Reviewed: 3/15/2016  © 9251-1175 [employer]. [address] [place] [address], [place] [address]. All rights reserved. This information is not intended as a substitute for professional medical care. Always follow your healthcare professional's instructions.              Pneumonmia Discharge Instructions                Coumadin Discharge Instructions                         [facility] Sign-Up     Activating your [facility] account is as easy as 1-2-3!     1) Visit [email or web address], select Sign Up Now, enter this activation code and your date of birth, then select Next.  [email or web address]  Expires: 3/23/2017 10:16 AM      2) Create a username and password to use when you visit [facility] in the future and select a security question in case you lose your password and select Next.    3) Enter your e-mail address and click Sign Up!    Additional Information  If you have questions, please e-mail [email or web address] or call [phone] to talk to our [facility] staff. Remember, [facility] is NOT to be used for urgent needs. For medical emergencies, dial 911.          [facility] complies with applicable Federal civil rights laws and does not discriminate on the basis of race, color, national origin, age, disability, or sex.

## 2017-02-06 NOTE — PROGRESS NOTES
V/O Dr Choudhary Change dose Tamiflu to 30mg po daily(treatment dosage) based on renal function(CrCl 22.5 ml/min) per dosing guidelines

## 2017-02-06 NOTE — ED NOTES
Spoke to [facility] Hospitalist in reference to patient's hypotension.  MD will speak to staff and call me back.  Will continue to monitor closely.

## 2017-02-06 NOTE — PROGRESS NOTES
Dose adjustment Cipro 400mg iv q24h based on renal function(CrCl 22.5ml/min) per dosing guidelines

## 2017-02-06 NOTE — PT/OT/SLP PROGRESS
"Occupational Therapy      Leahgileli Bishop  MRN: [id number]    Patient not seen today secondary to  pt adamantly refusing.  Stating "You tell the doctor I am refusing" "I haven't slept and you can go jump in the lake". Will follow-up .    Roverto Ann OT  2/6/2017  "

## 2017-02-06 NOTE — PLAN OF CARE
Problem: Patient Care Overview  Goal: Plan of Care Review  Outcome: Ongoing (interventions implemented as appropriate)  Cont to monitor resp needs

## 2017-02-06 NOTE — H&P
[facility] Internal Medicine History and Physical - Resident Note    Admitting Team: Medicine Team A  Attending Physician: Jessica  Resident: Julieth  Interns: Funmi    Date of Admit: 2/6/2017    Chief Complaint     Cough and fever for 4 days    Subjective:      History of Present Illness:  Jazmín Bishop is a 81 y.o. female who  has a past medical history of Arthritis; Cataract; CHF (congestive heart failure); Chicken pox; Hypertension; and Ulcer.. The patient presented to [facility] on 2/6/2017 with a primary complaint of Abdominal Pain (sent from "[facility] home for eval of abdominal distention with an episode of vomting yesterday. also has had cough. reported fever x2 days. ) and Cough     Presents from [email or web address] for fever and cough.  Pt endorses 3-4 days of cough productive of yellow-green sputum with subjective fevers and chills.  Cough is associated with subcostal discomfort.  Pt denies diarrhea, nausea, emesis, urinary complaints, myalgias, sore throat.    Past Medical History:  Past Medical History   Diagnosis Date    Arthritis     Cataract     CHF (congestive heart failure)     Chicken pox     Hypertension     Ulcer        Past Surgical History:  Past Surgical History   Procedure Laterality Date    Pylorplasty      Hysterectomy      Cholecystectomy      Small intestine surgery      Stomach surgery      Eye surgery      Spine surgery       x2       Allergies:  Review of patient's allergies indicates:   Allergen Reactions    Codeine sulfate Other (See Comments)     CONFUSION         Home Medications:  Prior to Admission medications    Medication Sig Start Date End Date Taking? Authorizing Provider   aspirin (ECOTRIN) 81 MG EC tablet Take 81 mg by mouth once daily.    Historical Provider, MD   carvedilol (COREG) 25 MG tablet Take 1 tablet (25 mg total) by mouth 2 (two) times daily with meals. 6/23/15   Xander Portillo MD   ferrous gluconate (FERGON) 324 MG tablet Take 1 tablet  (324 mg total) by mouth 3 (three) times daily with meals. 16   Kanu Britton MD   furosemide (LASIX) 40 MG tablet Take 1 tablet (40 mg total) by mouth once daily. 16  Mena Del Castillo NP   hydrOXYzine pamoate (VISTARIL) 25 MG Cap Take 1 capsule (25 mg total) by mouth nightly as needed. 16   Mena Del Castillo NP   lisinopril (PRINIVIL,ZESTRIL) 20 MG tablet Take 2 tablets (40 mg total) by mouth nightly. 6/23/15   Xander Portillo MD   methocarbamol (ROBAXIN) 500 MG Tab Take 1 tablet (500 mg total) by mouth 2 (two) times daily as needed. 11/12/15   Mena Del Castillo NP   promethazine (PHENERGAN) 25 MG tablet Take 1 tablet (25 mg total) by mouth daily as needed for Nausea. 16   Indra Clark MD   sucralfate (CARAFATE) 1 gram tablet Take 1 g by mouth 2 (two) times daily.    Historical Provider, MD   tramadol (ULTRAM) 50 mg tablet Take 50 mg by mouth every 8 (eight) hours as needed for Pain.    Historical Provider, MD   ursodiol (ACTIGALL) 300 mg capsule Take 1 capsule (300 mg total) by mouth 2 (two) times daily. 16  Kanu Britton MD   warfarin (COUMADIN) 3 MG tablet Take 1 tablet (3 mg total) by mouth Daily. 9/28/15 9/27/16  Hernan Bah MD       Family History:  Family History   Problem Relation Age of Onset    Kidney disease Mother     No Known Problems Father        Social History:  Social History   Substance Use Topics    Smoking status: Never Smoker    Smokeless tobacco: Never Used    Alcohol use No       Review of Systems:  Pertinent positives and negatives are listed in HPI.  All other systems are reviewed and are negative.    Health Maintaince :   Primary Care Physician: [facility]  Immunizations:   TDap is up to date.  Influenza is up to date.  Pneumovax is up to date.     Objective:   Last 24 Hour Vital Signs:  BP  Min: 69/40  Max: 103/77  Temp  Av.4 °F (38 °C)  Min: 99.4 °F (37.4 °C)  Max: 102 °F (38.9 °C)  Pulse  Av  Min: 61  Max: 90  Resp  Avg:  "21.3  Min: 15  Max: 27  SpO2  Av.2 %  Min: 88 %  Max: 98 %  Height  Av' 10" (147.3 cm)  Min: 4' 10" (147.3 cm)  Max: 4' 10" (147.3 cm)  Weight  Av.7 kg (96 lb 7 oz)  Min: 43.7 kg (96 lb 7 oz)  Max: 43.7 kg (96 lb 7 oz)  Body mass index is 20.16 kg/(m^2).       Physical Examination:  General: Alert and awake in NAD  Head:  Normocephalic and atraumatic  Eyes:  PERRL; EOMi with anicteric sclera and clear conjunctivae; NC in place  Mouth:  Oropharynx clear and without exudate; moist mucous membranes  Cardio:  Regular rate and rhythm with normal S1 and S2; no murmurs or rubs  Resp:  Mild expiratory wheezes; diffusely rhonchorous  Abdom: Soft, NTND with normoactive bowel sounds  Extrem: WWP with no edema  Skin:  No rashes, lesions, or color changes  Pulses: 2+ radial; unable to palpate DPs  Neuro:  AAOx3; cooperative and pleasant with no focal deficits    Laboratory:  Most Recent Data:  CBC: Lab Results   Component Value Date    WBC 3.69 (L) 2017    HGB 10.4 (L) 2017    HCT 31.9 (L) 2017     (L) 2017    MCV 97 2017    RDW 15.2 (H) 2017     BMP: Lab Results   Component Value Date     2017    K 3.9 2017     2017    CO2 26 2017    BUN 43 (H) 2017    CREATININE 1.4 2017     2017    CALCIUM 7.7 (L) 2017    MG 2.1 2016    PHOS 3.0 2016     LFTs: Lab Results   Component Value Date    PROT 5.7 (L) 2017    ALBUMIN 3.0 (L) 2017    BILITOT 0.5 2017    AST 25 2017    ALKPHOS 86 2017    ALT 16 2017     Coags:   Lab Results   Component Value Date    INR 1.1 2016     Urinalysis: Lab Results   Component Value Date    COLORU Yellow 2017    SPECGRAV 1.010 2017    NITRITE Negative 2017    KETONESU Trace (A) 2017    UROBILINOGEN Negative 2017       Microbiology Data:   Rapid Flu -- neg   UCx -- pending   BCx -- " pending      Other Results:  EKG (my interpretation):   - NONE    Radiology:  Imaging Results         X-Ray Chest 1 View (Final result) Result time:  02/06/17 02:11:15    Final result by Quan Askew MD (02/06/17 02:11:15)    Impression:        Perihilar reticular opacities and mild groundglass attenuation of the lung parenchyma suggesting edema or an infectious/inflammatory process.      Electronically signed by: QUAN ASKEW MD  Date:     02/06/17  Time:    02:11     Narrative:    Technique: AP chest radiograph.    Comparison: Radiograph 05/20/2016.    Findings:    Mediastinal structures are midline there cardiac silhouette is magnified by AP technique but appears slightly enlarged.  There is calcification of the aortic arch.  Lung volumes are symmetric.  Perihilar reticular opacities and mild groundglass attenuation of the lung parenchyma identified.  No focal consolidation.  No pneumothorax or pleural effusions.  There is generalized osteopenia without acute osseous abnormalities.  Advanced degenerative changes of the right glenohumeral joint with findings suggesting chronic rotator cuff tear.  No free air beneath the diaphragm.                 Assessment:     Jazmín Bishop is a 81 y.o. female with:     Plan:     Sepsis 2/2 HAP:  - p/w fever, productive cough x 4d              Yellow green sputum  - SIRS 2 -- T102F, SBP 60's               QSOFA = 1               S/p 3L NS boluses  - WBC 3.7             Lactate neg               UA with ketones  - CXR with ground glass c/w edema vs infection vs inflammation  - continue Duonebs PRN                Rapid Flu neg  - continue Vanc, Zosyn, Cipro              BCx, RCx, UCx pending    Acute Kidney Injury:  - Cr 1.4 on admit (baseline 0.7)                UA with trace ketones  - s/p 3L NS in ED              Holding home ACE              urine lytes pending    Pancytopenia:  - WBC 3.7; RBC 3.3;               Unclear etiology, but some  component of sepsis  - defer HIV, as suspicion is low              PLT transiently low in past    Chronic Persistent A-Fib:  - CHADSVasc 5                 Rate controlled on exam  - Continue home Coumadin and ASA               Holding home Coreg 2/2 hyptension    Chronic Diastolic Heart Failure with Pulmonary HTN:  - Nov 2015 ECHO with diastolic dysfxn and PAP ~80             Volume down on initial exam  - s/p 3L NS in ED                Holding home Lasix              Repeat ECHO pending    Essential HTN:  - hypotensive on admit                  S/p 3L NS boluses in ED  - SBP 70's on exam                 Holding home Coreg and ACE    Hx Iron Deficiency Anemia and B12 Deficiency:  - Hgb above baseline                 Repeat anemia studies pending    Hypocalcemia:  - Ca 7.7 but corrects to 8.5          Starting supplementation    PPx:  Coumadin  Diet:  Dental Soft    Disposition:  Pending clinical improvement      Code Status:     DNR/DNI    Home [place]  [facility] Internal Medicine HO-II  [facility] Medicine Service    [facility] Medicine Hospitalist Pager numbers:   [facility] Hospitalist Medicine Team A (Jessica/Zelda): [phone]  [facility] Hospitalist Medicine Team B (Ching/Heidy):  [phone]

## 2017-02-06 NOTE — PLAN OF CARE
-Pt is a resident at [facility]. Updated clinical sent via [employer]. [place] to follow for additional needs.     02/06/17 1229   Discharge Assessment   Assessment Type Discharge Planning Assessment   Confirmed/corrected address and phone number on facesheet? Yes   Assessment information obtained from? Patient   Communicated expected length of stay with patient/caregiver yes   Prior to hospitilization cognitive status: Alert/Oriented   Prior to hospitalization functional status: Needs Assistance;Assistive Equipment   Current cognitive status: Alert/Oriented   Current Functional Status: Needs Assistance   Arrived From nursing home   Lives With facility resident   Able to Return to Prior Arrangements yes   Is patient able to care for self after discharge? Yes   Who are your caregiver(s) and their phone number(s)? Lupe(sister) [phone]   Patient's perception of discharge disposition nursing home   Readmission Within The Last 30 Days no previous admission in last 30 days   Patient currently being followed by outpatient case management? No   Patient currently receives home health services? No   Does the patient currently use HME? Yes   Patient currently receives private duty nursing? No   Patient currently receives any other outside agency services? No   Equipment Currently Used at Home walker, rolling;wheelchair   Do you have any problems affording any of your prescribed medications? No   Is the patient taking medications as prescribed? yes   Do you have any financial concerns preventing you from receiving the healthcare you need? No   Does the patient have transportation to healthcare appointments? Yes   Transportation Available van, wheelchair accessible;agency transportation   On Dialysis? No   Does the patient receive services at the Coumadin Clinic? No   Are there any open cases? No   Discharge Plan A Return to nursing home   Discharge Plan B Return to Nursing Home;Skilled Nursing Facility    Patient/Family In Agreement With Plan yes

## 2017-02-07 LAB
ALBUMIN SERPL BCP-MCNC: 2.5 G/DL
ALP SERPL-CCNC: 67 U/L
ALT SERPL W/O P-5'-P-CCNC: 15 U/L
ANION GAP SERPL CALC-SCNC: 10 MMOL/L
AST SERPL-CCNC: 24 U/L
BACTERIA UR CULT: NO GROWTH
BASOPHILS # BLD AUTO: 0.01 K/UL
BASOPHILS NFR BLD: 0.3 %
BILIRUB SERPL-MCNC: 0.5 MG/DL
BUN SERPL-MCNC: 21 MG/DL
CALCIUM SERPL-MCNC: 7.5 MG/DL
CHLORIDE SERPL-SCNC: 109 MMOL/L
CO2 SERPL-SCNC: 22 MMOL/L
CREAT SERPL-MCNC: 0.7 MG/DL
DIFFERENTIAL METHOD: ABNORMAL
EOSINOPHIL # BLD AUTO: 0 K/UL
EOSINOPHIL NFR BLD: 0 %
ERYTHROCYTE [DISTWIDTH] IN BLOOD BY AUTOMATED COUNT: 15.5 %
EST. GFR  (AFRICAN AMERICAN): >60 ML/MIN/1.73 M^2
EST. GFR  (NON AFRICAN AMERICAN): >60 ML/MIN/1.73 M^2
GLUCOSE SERPL-MCNC: 87 MG/DL
HCT VFR BLD AUTO: 30.5 %
HGB BLD-MCNC: 9.5 G/DL
LYMPHOCYTES # BLD AUTO: 0.8 K/UL
LYMPHOCYTES NFR BLD: 27.7 %
MCH RBC QN AUTO: 31 PG
MCHC RBC AUTO-ENTMCNC: 31.1 %
MCV RBC AUTO: 100 FL
MONOCYTES # BLD AUTO: 0.5 K/UL
MONOCYTES NFR BLD: 17.6 %
NEUTROPHILS # BLD AUTO: 1.6 K/UL
NEUTROPHILS NFR BLD: 54.4 %
PLATELET # BLD AUTO: 107 K/UL
PMV BLD AUTO: 9.8 FL
POTASSIUM SERPL-SCNC: 3.5 MMOL/L
PROT SERPL-MCNC: 4.9 G/DL
RBC # BLD AUTO: 3.06 M/UL
SODIUM SERPL-SCNC: 141 MMOL/L
VANCOMYCIN SERPL-MCNC: 3.1 UG/ML
WBC # BLD AUTO: 2.89 K/UL

## 2017-02-07 PROCEDURE — 63600175 PHARM REV CODE 636 W HCPCS: Performed by: INTERNAL MEDICINE

## 2017-02-07 PROCEDURE — 25000003 PHARM REV CODE 250: Performed by: STUDENT IN AN ORGANIZED HEALTH CARE EDUCATION/TRAINING PROGRAM

## 2017-02-07 PROCEDURE — 25000242 PHARM REV CODE 250 ALT 637 W/ HCPCS: Performed by: STUDENT IN AN ORGANIZED HEALTH CARE EDUCATION/TRAINING PROGRAM

## 2017-02-07 PROCEDURE — 85025 COMPLETE CBC W/AUTO DIFF WBC: CPT

## 2017-02-07 PROCEDURE — 80053 COMPREHEN METABOLIC PANEL: CPT

## 2017-02-07 PROCEDURE — 94640 AIRWAY INHALATION TREATMENT: CPT

## 2017-02-07 PROCEDURE — 63600175 PHARM REV CODE 636 W HCPCS: Performed by: EMERGENCY MEDICINE

## 2017-02-07 PROCEDURE — 27000221 HC OXYGEN, UP TO 24 HOURS

## 2017-02-07 PROCEDURE — 36415 COLL VENOUS BLD VENIPUNCTURE: CPT

## 2017-02-07 PROCEDURE — 11000001 HC ACUTE MED/SURG PRIVATE ROOM

## 2017-02-07 PROCEDURE — 25000003 PHARM REV CODE 250: Performed by: INTERNAL MEDICINE

## 2017-02-07 PROCEDURE — 80202 ASSAY OF VANCOMYCIN: CPT

## 2017-02-07 PROCEDURE — 63600175 PHARM REV CODE 636 W HCPCS: Performed by: STUDENT IN AN ORGANIZED HEALTH CARE EDUCATION/TRAINING PROGRAM

## 2017-02-07 PROCEDURE — 94761 N-INVAS EAR/PLS OXIMETRY MLT: CPT

## 2017-02-07 RX ORDER — CYANOCOBALAMIN 1000 UG/ML
1000 INJECTION, SOLUTION INTRAMUSCULAR; SUBCUTANEOUS DAILY
Status: DISCONTINUED | OUTPATIENT
Start: 2017-02-07 | End: 2017-02-09 | Stop reason: HOSPADM

## 2017-02-07 RX ORDER — LANOLIN ALCOHOL/MO/W.PET/CERES
1000 CREAM (GRAM) TOPICAL DAILY
Status: DISCONTINUED | OUTPATIENT
Start: 2017-02-07 | End: 2017-02-07

## 2017-02-07 RX ORDER — ATORVASTATIN CALCIUM 10 MG/1
10 TABLET, FILM COATED ORAL DAILY
Status: DISCONTINUED | OUTPATIENT
Start: 2017-02-07 | End: 2017-02-07

## 2017-02-07 RX ADMIN — TRAMADOL HYDROCHLORIDE 50 MG: 50 TABLET, COATED ORAL at 06:02

## 2017-02-07 RX ADMIN — OSELTAMIVIR PHOSPHATE 30 MG: 30 CAPSULE ORAL at 09:02

## 2017-02-07 RX ADMIN — WARFARIN SODIUM 3 MG: 3 TABLET ORAL at 06:02

## 2017-02-07 RX ADMIN — ASPIRIN 81 MG: 81 TABLET, COATED ORAL at 09:02

## 2017-02-07 RX ADMIN — TRAMADOL HYDROCHLORIDE 50 MG: 50 TABLET, COATED ORAL at 12:02

## 2017-02-07 RX ADMIN — BENZONATATE 100 MG: 100 CAPSULE ORAL at 12:02

## 2017-02-07 RX ADMIN — PIPERACILLIN SODIUM AND TAZOBACTAM SODIUM 4.5 G: 4; .5 INJECTION, POWDER, FOR SOLUTION INTRAVENOUS at 11:02

## 2017-02-07 RX ADMIN — CYANOCOBALAMIN TAB 1000 MCG 1000 MCG: 1000 TAB at 09:02

## 2017-02-07 RX ADMIN — CYANOCOBALAMIN 1000 MCG: 1000 INJECTION, SOLUTION INTRAMUSCULAR; SUBCUTANEOUS at 12:02

## 2017-02-07 RX ADMIN — CALCIUM 500 MG: 500 TABLET ORAL at 11:02

## 2017-02-07 RX ADMIN — CIPROFLOXACIN 400 MG: 2 INJECTION, SOLUTION INTRAVENOUS at 06:02

## 2017-02-07 RX ADMIN — TRAMADOL HYDROCHLORIDE 50 MG: 50 TABLET, COATED ORAL at 09:02

## 2017-02-07 RX ADMIN — IPRATROPIUM BROMIDE AND ALBUTEROL SULFATE 3 ML: .5; 3 SOLUTION RESPIRATORY (INHALATION) at 02:02

## 2017-02-07 RX ADMIN — VANCOMYCIN HYDROCHLORIDE 750 MG: 750 INJECTION, POWDER, LYOPHILIZED, FOR SOLUTION INTRAVENOUS at 09:02

## 2017-02-07 RX ADMIN — CALCIUM 500 MG: 500 TABLET ORAL at 06:02

## 2017-02-07 NOTE — PLAN OF CARE
Problem: Patient Care Overview  Goal: Plan of Care Review  Patient on oxygen with documented flow. Will continue to monitor.   Outcome: Ongoing (interventions implemented as appropriate)  Reviewed plan of care with pt.Will continue antibiotic therapy and pain management. Safety measures are in place, bed low and in lock position, call light in reach, bed alarm is on, and family remains at the bedside. Pt verbalizes full understanding of their plan of care.

## 2017-02-07 NOTE — PT/OT/SLP PROGRESS
"Occupational Therapy      Jazmín Spencer Bishop  MRN: [id number]    Patient not seen today secondary to  pt refused.  Educated on benefits of therapy, continued to refuse; stated "maybe later". Will follow-up .    Roverto Ann OT  2/7/2017  "

## 2017-02-07 NOTE — PROGRESS NOTES
[facility] Medicine Resident HO-I Progress Note    Subjective:      Complained of cough yesterday, improved with tessalon. No other complaints. Tolerating breathing treatments well. Denies fevers/chills/sweats, cp, sob. Tolerating PO diet. Good uop. See plan of care notes concerning discussions with POA about code status.    Objective:   Last 24 Hour Vital Signs:  BP  Min: 70/37  Max: 123/65  Temp  Av.8 °F (36.6 °C)  Min: 97 °F (36.1 °C)  Max: 98.6 °F (37 °C)  Pulse  Av.6  Min: 48  Max: 82  Resp  Av.1  Min: 14  Max: 22  SpO2  Av.4 %  Min: 96 %  Max: 100 %  Weight  Av.6 kg (100 lb 8.5 oz)  Min: 45.6 kg (100 lb 8.5 oz)  Max: 45.6 kg (100 lb 8.5 oz)  I/O last 3 completed shifts:  In: 360 [P.O.:360]  Out: 850 [Urine:850]    Physical Examination:  General: Alert and awake in NAD  Head:  Normocephalic and atraumatic  Eyes:  PERRL; EOMi with anicteric sclera and clear conjunctivae; NC in place  Mouth:  Oropharynx clear and without exudate; moist mucous membranes  Cardio:  Regular rate and rhythm with normal S1 and S2; no murmurs or rubs  Resp:  Expiratory wheezes; diffusely rhonchorous  Abdom: Soft, NTND with normoactive bowel sounds  Extrem: WWP with no edema  Skin:  No rashes, lesions, or color changes  Pulses: 2+ radial; unable to palpate DPs  Neuro:  AAOx3; cooperative and pleasant with no focal deficits    Laboratory:  Laboratory Data Reviewed: yes  Pertinent Findings:  WBC 2.89  H/H 9.5/30.5  Plt 107    Na 141  K 3.5  Cl 109  HCO3 22  BUN 21  Cr 0.7  Glu 87    Microbiology Data Reviewed: yes  Pertinent Findings:  SpCx: few G+ cocci, rare G- rods on gram stain  UCx: neg  BCx: NGTD    Other Results:    Radiology Data Reviewed: yes  Pertinent Findings:  CXR (PA and Lat): b/l hilar opacities indicative of infection/inflammation    Current Medications:     Infusions:        Scheduled:   aspirin  81 mg Oral Daily    calcium carbonate  500 mg Oral TID WM    ciprofloxacin  400 mg Intravenous  Q24H    oseltamivir  30 mg Oral Daily    piperacillin-tazobactam 4.5 g in dextrose 5 % 100 mL IVPB (ready to mix system)  4.5 g Intravenous Q12H    sodium chloride 3%  4 mL Nebulization Once    vancomycin (VANCOCIN) IVPB  15 mg/kg Intravenous Q24H    warfarin  3 mg Oral Daily        PRN:  albuterol-ipratropium 2.5mg-0.5mg/3mL, benzonatate, ondansetron, tramadol    Antibiotics and Day Number of Therapy:  Cipro 400mg q24 hr (2/6-present)  Zosyn 4.5g q12hr (2/6-present)  Vanc 750mg q24hr (2/6-present)    Lines and Day Number of Therapy:  PIV    Assessment:     Jazmín Bishop is a 81 y.o.female with  Patient Active Problem List    Diagnosis Date Noted    Chronic diastolic heart failure 02/06/2017    Sepsis due to pneumonia 02/06/2017    NGUYEN (acute kidney injury) 02/06/2017    Pancytopenia 02/06/2017    Persistent atrial fibrillation 02/06/2017    Acute febrile illness 02/06/2017    Dehydration 02/06/2017    Hypotension 02/06/2017    Iron deficiency anemia 03/04/2016    Mitral valve prolapse 06/23/2015    Pulmonary hypertension 06/23/2015    Coronary artery disease due to lipid rich plaque 06/23/2015    MR (mitral regurgitation) 06/23/2015    PVC's (premature ventricular contractions) 06/23/2015    HTN (hypertension) 07/03/2014    OA (osteoarthritis) 07/03/2014        Plan:     Sepsis 2/2 HAP:  - p/w fever, productive cough x 4d  Yellow green sputum  - SIRS 2 -- T102F, SBP 60's  QSOFA = 1  S/p 3L NS boluses  - WBC 3.7  Lactate neg  UA with ketones  - CXR with ground glass c/w edema vs infection vs inflammation  - mental status and symptoms improving  - continue Duonebs PRN  Rapid Flu neg  - continue Vanc, Zosyn, Cipro  BCx NGTD  UCx neg  SpCx with few bacteria on gram stain     Acute Kidney Injury:  - Cr 1.4 on admit (baseline 0.7)  UA with trace ketones  - s/p 3L NS in ED  Holding home ACE  urine lytes pending     Pancytopenia:  - WBC 3.7; RBC 3.3;   Unclear etiology, but  some component of sepsis  - defer HIV, as suspicion is low  PLT transiently low in past     Chronic Persistent A-Fib:  - CHADSVasc 5  Rate controlled on exam  - Continue home Coumadin and ASA  Holding home Coreg 2/2 hyoptension     Chronic Diastolic Heart Failure with Pulmonary HTN:  - Nov 2015 ECHO with diastolic dysfxn and PAP ~80  Volume down on initial exam  - s/p 3L NS in ED  Holding home Lasix  Repeat ECHO pending     Essential HTN:  - hypotensive on admit  S/p 3L NS boluses in ED  - SBP 70's on exam  Holding home Coreg and ACE     Hx Iron Deficiency Anemia and B12 Deficiency:  - Hgb above baseline  Repeat anemia studies c/w B12Def  provide supplementation     Hypocalcemia:  - Ca 7.7 but corrects to 8.5  cont supplementation     PPx:  Coumadin  Diet: Dental Soft  Disposition: Pending continued clinical improvement    Daniele Maloney  [facility] Internal Medicine [facility] Medicine Service Team A    [facility] Medicine Hospitalist Pager numbers:   [facility] Hospitalist Medicine Team A (Jessica/Zelda): [phone]  [facility] Hospitalist Medicine Team B (Ching/Heidy):  [phone]

## 2017-02-07 NOTE — PLAN OF CARE
Problem: Patient Care Overview  Goal: Plan of Care Review  Outcome: Ongoing (interventions implemented as appropriate)  Reviewed plan of care with. Will continue to monitor for pain. Pt A. Fib and sinus jose on monitor. Safety measures are in place, bed low and in lock position, call light in reach, and bed alarm is on. Pt verbalizes full understanding of their plan of care.

## 2017-02-07 NOTE — PLAN OF CARE
Problem: Patient Care Overview  Goal: Plan of Care Review  Patient on oxygen with documented flow. Will continue to monitor.   Outcome: Ongoing (interventions implemented as appropriate)  Will cont  To monitor resp needs

## 2017-02-07 NOTE — PLAN OF CARE
Problem: Patient Care Overview  Goal: Plan of Care Review  Patient on oxygen with documented flow. Will continue to monitor.   Outcome: Ongoing (interventions implemented as appropriate)  Plan of care reviewed with pt. Pt verbalizes understanding. Bed in lowest position, side rails up times 2, wheels locked, and bed alarm on. Refused nonslip socks. Call bell w/in reach. Instructed to call for needs/assist oob. Pt on o2 2 liters nasal cannula. Pt given prn duonebs t/o night by respiratory per md order. No c/o sob t/o night. Vss. Iv antibiotics continued per md order. Pt on telemetry. A fib 50's-70's. No true red alarms noted. Will continue to monitor.

## 2017-02-08 PROBLEM — D50.8 IRON DEFICIENCY ANEMIA SECONDARY TO INADEQUATE DIETARY IRON INTAKE: Status: ACTIVE | Noted: 2017-02-08

## 2017-02-08 LAB
ALBUMIN SERPL BCP-MCNC: 2.6 G/DL
ALP SERPL-CCNC: 72 U/L
ALT SERPL W/O P-5'-P-CCNC: 15 U/L
ANION GAP SERPL CALC-SCNC: 9 MMOL/L
AST SERPL-CCNC: 25 U/L
BACTERIA SPEC AEROBE CULT: NORMAL
BASOPHILS # BLD AUTO: 0.01 K/UL
BASOPHILS NFR BLD: 0.3 %
BILIRUB SERPL-MCNC: 0.6 MG/DL
BUN SERPL-MCNC: 7 MG/DL
CALCIUM SERPL-MCNC: 8.1 MG/DL
CHLORIDE SERPL-SCNC: 107 MMOL/L
CO2 SERPL-SCNC: 25 MMOL/L
CREAT SERPL-MCNC: 0.6 MG/DL
DIFFERENTIAL METHOD: ABNORMAL
EOSINOPHIL # BLD AUTO: 0 K/UL
EOSINOPHIL NFR BLD: 0.7 %
ERYTHROCYTE [DISTWIDTH] IN BLOOD BY AUTOMATED COUNT: 15.3 %
EST. GFR  (AFRICAN AMERICAN): >60 ML/MIN/1.73 M^2
EST. GFR  (NON AFRICAN AMERICAN): >60 ML/MIN/1.73 M^2
GLUCOSE SERPL-MCNC: 88 MG/DL
GRAM STN SPEC: NORMAL
HCT VFR BLD AUTO: 33.7 %
HGB BLD-MCNC: 10.7 G/DL
LYMPHOCYTES # BLD AUTO: 1 K/UL
LYMPHOCYTES NFR BLD: 34.9 %
MCH RBC QN AUTO: 31.4 PG
MCHC RBC AUTO-ENTMCNC: 31.8 %
MCV RBC AUTO: 99 FL
MONOCYTES # BLD AUTO: 0.6 K/UL
MONOCYTES NFR BLD: 20.7 %
NEUTROPHILS # BLD AUTO: 1.3 K/UL
NEUTROPHILS NFR BLD: 43.1 %
PLATELET # BLD AUTO: 104 K/UL
PMV BLD AUTO: 9.3 FL
POCT GLUCOSE: 111 MG/DL (ref 70–110)
POTASSIUM SERPL-SCNC: 3.7 MMOL/L
PROT SERPL-MCNC: 5.3 G/DL
RBC # BLD AUTO: 3.41 M/UL
SODIUM SERPL-SCNC: 141 MMOL/L
WBC # BLD AUTO: 2.95 K/UL

## 2017-02-08 PROCEDURE — 25000003 PHARM REV CODE 250: Performed by: STUDENT IN AN ORGANIZED HEALTH CARE EDUCATION/TRAINING PROGRAM

## 2017-02-08 PROCEDURE — 63600175 PHARM REV CODE 636 W HCPCS: Performed by: EMERGENCY MEDICINE

## 2017-02-08 PROCEDURE — 63600175 PHARM REV CODE 636 W HCPCS: Performed by: STUDENT IN AN ORGANIZED HEALTH CARE EDUCATION/TRAINING PROGRAM

## 2017-02-08 PROCEDURE — 11000001 HC ACUTE MED/SURG PRIVATE ROOM

## 2017-02-08 PROCEDURE — 80053 COMPREHEN METABOLIC PANEL: CPT

## 2017-02-08 PROCEDURE — 25000003 PHARM REV CODE 250: Performed by: INTERNAL MEDICINE

## 2017-02-08 PROCEDURE — 94640 AIRWAY INHALATION TREATMENT: CPT

## 2017-02-08 PROCEDURE — 25000242 PHARM REV CODE 250 ALT 637 W/ HCPCS: Performed by: STUDENT IN AN ORGANIZED HEALTH CARE EDUCATION/TRAINING PROGRAM

## 2017-02-08 PROCEDURE — 36415 COLL VENOUS BLD VENIPUNCTURE: CPT

## 2017-02-08 PROCEDURE — 27000221 HC OXYGEN, UP TO 24 HOURS

## 2017-02-08 PROCEDURE — 63600175 PHARM REV CODE 636 W HCPCS: Performed by: INTERNAL MEDICINE

## 2017-02-08 PROCEDURE — 85025 COMPLETE CBC W/AUTO DIFF WBC: CPT

## 2017-02-08 PROCEDURE — 94668 MNPJ CHEST WALL SBSQ: CPT

## 2017-02-08 PROCEDURE — 25000242 PHARM REV CODE 250 ALT 637 W/ HCPCS: Performed by: INTERNAL MEDICINE

## 2017-02-08 PROCEDURE — 94761 N-INVAS EAR/PLS OXIMETRY MLT: CPT

## 2017-02-08 PROCEDURE — 94664 DEMO&/EVAL PT USE INHALER: CPT

## 2017-02-08 RX ORDER — CIPROFLOXACIN 500 MG/1
500 TABLET ORAL EVERY 24 HOURS
Status: DISCONTINUED | OUTPATIENT
Start: 2017-02-09 | End: 2017-02-09 | Stop reason: HOSPADM

## 2017-02-08 RX ORDER — IPRATROPIUM BROMIDE AND ALBUTEROL SULFATE 2.5; .5 MG/3ML; MG/3ML
3 SOLUTION RESPIRATORY (INHALATION) EVERY 4 HOURS
Status: DISCONTINUED | OUTPATIENT
Start: 2017-02-08 | End: 2017-02-09 | Stop reason: HOSPADM

## 2017-02-08 RX ORDER — IPRATROPIUM BROMIDE AND ALBUTEROL SULFATE 2.5; .5 MG/3ML; MG/3ML
3 SOLUTION RESPIRATORY (INHALATION) EVERY 6 HOURS
Status: DISCONTINUED | OUTPATIENT
Start: 2017-02-08 | End: 2017-02-08 | Stop reason: SDUPTHER

## 2017-02-08 RX ORDER — FUROSEMIDE 40 MG/1
40 TABLET ORAL DAILY
Status: DISCONTINUED | OUTPATIENT
Start: 2017-02-08 | End: 2017-02-08

## 2017-02-08 RX ORDER — CARVEDILOL 25 MG/1
25 TABLET ORAL 2 TIMES DAILY WITH MEALS
Status: DISCONTINUED | OUTPATIENT
Start: 2017-02-08 | End: 2017-02-09 | Stop reason: HOSPADM

## 2017-02-08 RX ORDER — FUROSEMIDE 40 MG/1
40 TABLET ORAL DAILY
Status: DISCONTINUED | OUTPATIENT
Start: 2017-02-09 | End: 2017-02-09 | Stop reason: HOSPADM

## 2017-02-08 RX ORDER — FUROSEMIDE 10 MG/ML
40 INJECTION INTRAMUSCULAR; INTRAVENOUS ONCE
Status: COMPLETED | OUTPATIENT
Start: 2017-02-08 | End: 2017-02-08

## 2017-02-08 RX ORDER — IPRATROPIUM BROMIDE AND ALBUTEROL SULFATE 2.5; .5 MG/3ML; MG/3ML
3 SOLUTION RESPIRATORY (INHALATION) ONCE
Status: COMPLETED | OUTPATIENT
Start: 2017-02-08 | End: 2017-02-08

## 2017-02-08 RX ORDER — GUAIFENESIN 600 MG/1
600 TABLET, EXTENDED RELEASE ORAL 2 TIMES DAILY
Status: DISCONTINUED | OUTPATIENT
Start: 2017-02-08 | End: 2017-02-09 | Stop reason: HOSPADM

## 2017-02-08 RX ADMIN — CALCIUM 500 MG: 500 TABLET ORAL at 05:02

## 2017-02-08 RX ADMIN — IPRATROPIUM BROMIDE AND ALBUTEROL SULFATE 3 ML: .5; 3 SOLUTION RESPIRATORY (INHALATION) at 07:02

## 2017-02-08 RX ADMIN — PIPERACILLIN SODIUM AND TAZOBACTAM SODIUM 4.5 G: 4; .5 INJECTION, POWDER, FOR SOLUTION INTRAVENOUS at 05:02

## 2017-02-08 RX ADMIN — CARVEDILOL 25 MG: 25 TABLET, FILM COATED ORAL at 05:02

## 2017-02-08 RX ADMIN — ASPIRIN 81 MG: 81 TABLET, COATED ORAL at 09:02

## 2017-02-08 RX ADMIN — CYANOCOBALAMIN 1000 MCG: 1000 INJECTION, SOLUTION INTRAMUSCULAR; SUBCUTANEOUS at 09:02

## 2017-02-08 RX ADMIN — PIPERACILLIN SODIUM AND TAZOBACTAM SODIUM 4.5 G: 4; .5 INJECTION, POWDER, FOR SOLUTION INTRAVENOUS at 12:02

## 2017-02-08 RX ADMIN — VANCOMYCIN HYDROCHLORIDE 1000 MG: 1 INJECTION, POWDER, LYOPHILIZED, FOR SOLUTION INTRAVENOUS at 09:02

## 2017-02-08 RX ADMIN — WARFARIN SODIUM 3 MG: 3 TABLET ORAL at 05:02

## 2017-02-08 RX ADMIN — TRAMADOL HYDROCHLORIDE 50 MG: 50 TABLET, COATED ORAL at 08:02

## 2017-02-08 RX ADMIN — PIPERACILLIN SODIUM AND TAZOBACTAM SODIUM 4.5 G: 4; .5 INJECTION, POWDER, FOR SOLUTION INTRAVENOUS at 09:02

## 2017-02-08 RX ADMIN — IPRATROPIUM BROMIDE AND ALBUTEROL SULFATE 3 ML: .5; 3 SOLUTION RESPIRATORY (INHALATION) at 11:02

## 2017-02-08 RX ADMIN — CALCIUM 500 MG: 500 TABLET ORAL at 11:02

## 2017-02-08 RX ADMIN — OSELTAMIVIR PHOSPHATE 30 MG: 30 CAPSULE ORAL at 09:02

## 2017-02-08 RX ADMIN — PANTOPRAZOLE SODIUM 600 MG: 40 TABLET, DELAYED RELEASE ORAL at 09:02

## 2017-02-08 RX ADMIN — CALCIUM 500 MG: 500 TABLET ORAL at 09:02

## 2017-02-08 RX ADMIN — CARVEDILOL 25 MG: 25 TABLET, FILM COATED ORAL at 09:02

## 2017-02-08 RX ADMIN — IPRATROPIUM BROMIDE AND ALBUTEROL SULFATE 3 ML: .5; 3 SOLUTION RESPIRATORY (INHALATION) at 05:02

## 2017-02-08 RX ADMIN — FUROSEMIDE 40 MG: 10 INJECTION, SOLUTION INTRAMUSCULAR; INTRAVENOUS at 09:02

## 2017-02-08 RX ADMIN — TRAMADOL HYDROCHLORIDE 50 MG: 50 TABLET, COATED ORAL at 04:02

## 2017-02-08 RX ADMIN — CIPROFLOXACIN 400 MG: 2 INJECTION, SOLUTION INTRAVENOUS at 06:02

## 2017-02-08 RX ADMIN — PANTOPRAZOLE SODIUM 600 MG: 40 TABLET, DELAYED RELEASE ORAL at 08:02

## 2017-02-08 RX ADMIN — IPRATROPIUM BROMIDE AND ALBUTEROL SULFATE 3 ML: .5; 3 SOLUTION RESPIRATORY (INHALATION) at 04:02

## 2017-02-08 RX ADMIN — IPRATROPIUM BROMIDE AND ALBUTEROL SULFATE 3 ML: .5; 3 SOLUTION RESPIRATORY (INHALATION) at 02:02

## 2017-02-08 NOTE — PT/OT/SLP PROGRESS
"Physical Therapy      Jazmín Bishop  MRN: [id number]  Time 1045    Patient not seen today secondary to c/o fatigue and SOB " I already got up to use the commode".  Will follow-up as able.    Avery Kwan, PT    "

## 2017-02-08 NOTE — PLAN OF CARE
Problem: Patient Care Overview  Goal: Plan of Care Review  Patient on oxygen with documented flow. Will continue to monitor.   Outcome: Ongoing (interventions implemented as appropriate)  Plan of care reviewed with pt. Pt verbalizes understanding. Bed in lowest position, side rails up times 2, wheels locked, and bed alarm on. Refused nonslip socks.  Call bell w/in reach. Instructed to call for needs/assist oob. No c/o pain t/o night. Pt on o2 2 liters nasal cannula. Iv antibiotics continued per md order. Pt on telemetry. Afib upper 40's-50's. No true red alarms noted. Will continue to monitor.

## 2017-02-08 NOTE — PLAN OF CARE
Problem: Patient Care Overview  Goal: Plan of Care Review  Patient on oxygen with documented flow. Will continue to monitor.   Outcome: Ongoing (interventions implemented as appropriate)  Patient given aero treatment with no adverse reactions noted.  Patient tolerated treatment well.

## 2017-02-08 NOTE — PROGRESS NOTES
Occupational Therapy   Discharge Note    Jazmín Bishop   MRN: [id number]     Attempted OT eval--3rd day in a row.  Pt continues to refuse to participate.  MD team notified that pt is refusing and will be discharged from OT services.    Roverto Ann, OT 2/8/2017

## 2017-02-08 NOTE — PROGRESS NOTES
[facility] Medicine Resident HO-II Progress Note    Subjective:      Reporting poor sleep overnight 2/2 shortness of breath.  Continues to c/o mucous production.  Afebrile with no chest pain    Objective:   Last 24 Hour Vital Signs:  BP  Min: 123/56  Max: 160/77  Temp  Av °F (36.7 °C)  Min: 97.5 °F (36.4 °C)  Max: 98.4 °F (36.9 °C)  Pulse  Av.7  Min: 54  Max: 91  Resp  Av.8  Min: 16  Max: 18  SpO2  Av.1 %  Min: 95 %  Max: 98 %  Weight  Av.4 kg (104 lb 9.6 oz)  Min: 47.4 kg (104 lb 9.6 oz)  Max: 47.4 kg (104 lb 9.6 oz)  I/O last 3 completed shifts:  In: 1370 [P.O.:720; I.V.:350; IV Piggyback:300]  Out: 2779 [Urine:2775; Stool:4]    Physical Examination:  General: Alert and awake in NAD; NC in place  HENT:  NCAT; anicteric sclera; OP clear with MMM  Cardio:  Regular rate and rhythm with normal S1 and S2; no murmurs  Resp:  diffuse rhonchi with bibasilar crackles; no wheezes   Abdom: Soft, NTND with normoactive bowel sounds  Extrem: WWP with no edema  Pulses: 2+ and symmetric distally  Neuro:  AAOx3; cooperative and pleasant with no focal deficits      Laboratory:  Laboratory Data Reviewed: yes  Pertinent Findings:  Lab Results   Component Value Date    WBC 2.95 (L) 2017    HGB 10.7 (L) 2017    HCT 33.7 (L) 2017    MCV 99 (H) 2017     (L) 2017     Lab Results   Component Value Date    CREATININE 0.6 2017    BUN 7 (L) 2017     2017    K 3.7 2017     2017    CO2 25 2017     Lab Results   Component Value Date    ALT 15 2017    AST 25 2017    ALKPHOS 72 2017    BILITOT 0.6 2017         Microbiology Data Reviewed: yes  Pertinent Findings:   SpCx: few G+ cocci, rare G- rods on gram stain   UCx: neg   BCx: NGTD    Other Results:    Radiology Data Reviewed: yes  Pertinent Findings:  Imaging Results         X-Ray Chest PA And Lateral (Final result) Result time:  17 16:57:46     Final result by Clifton Pappas MD (02/06/17 16:57:46)    Impression:     As above.      Electronically signed by: CLIFTON PAPPAS MD  Date:     02/06/17  Time:    16:57     Narrative:    Chest PA lateral.    Findings: 2 views.  There are bilateral perihilar opacities with interstitial prominence as seen on previous examination performed on same day.  There is no pneumothorax.  The remainder of the examination is unchanged.            X-Ray Chest 1 View (Final result) Result time:  02/06/17 02:11:15    Final result by Skip Askew MD (02/06/17 02:11:15)    Impression:        Perihilar reticular opacities and mild groundglass attenuation of the lung parenchyma suggesting edema or an infectious/inflammatory process.      Electronically signed by: SKIP ASKEW MD  Date:     02/06/17  Time:    02:11     Narrative:    Technique: AP chest radiograph.    Comparison: Radiograph 05/20/2016.    Findings:    Mediastinal structures are midline there cardiac silhouette is magnified by AP technique but appears slightly enlarged.  There is calcification of the aortic arch.  Lung volumes are symmetric.  Perihilar reticular opacities and mild groundglass attenuation of the lung parenchyma identified.  No focal consolidation.  No pneumothorax or pleural effusions.  There is generalized osteopenia without acute osseous abnormalities.  Advanced degenerative changes of the right glenohumeral joint with findings suggesting chronic rotator cuff tear.  No free air beneath the diaphragm.                Current Medications:     Infusions:        Scheduled:   aspirin  81 mg Oral Daily    calcium carbonate  500 mg Oral TID WM    ciprofloxacin  400 mg Intravenous Q24H    cyanocobalamin  1,000 mcg Intramuscular Daily    oseltamivir  30 mg Oral Daily    piperacillin-tazobactam 4.5 g in dextrose 5 % 100 mL IVPB (ready to mix system)  4.5 g Intravenous Q12H    sodium chloride 3%  4 mL Nebulization Once    vancomycin (VANCOCIN) IVPB  1,000  mg Intravenous Q24H    warfarin  3 mg Oral Daily        PRN:  albuterol-ipratropium 2.5mg-0.5mg/3mL, benzonatate, ondansetron, tramadol    Antibiotics and Day Number of Therapy:  Cipro 400mg q24 hr (2/6-present)  Zosyn 4.5g q12hr (2/6-present)  Vanc 750mg q24hr (2/6-present)    Lines and Day Number of Therapy:  PIV    Assessment:     Jazmín Bishop is a 81 y.o.female with  Patient Active Problem List    Diagnosis Date Noted    Chronic diastolic heart failure 02/06/2017    Sepsis due to pneumonia 02/06/2017    NGUYEN (acute kidney injury) 02/06/2017    Pancytopenia 02/06/2017    Persistent atrial fibrillation 02/06/2017    Acute febrile illness 02/06/2017    Dehydration 02/06/2017    Hypotension 02/06/2017    Iron deficiency anemia 03/04/2016    Mitral valve prolapse 06/23/2015    Pulmonary hypertension 06/23/2015    Coronary artery disease due to lipid rich plaque 06/23/2015    MR (mitral regurgitation) 06/23/2015    PVC's (premature ventricular contractions) 06/23/2015    HTN (hypertension) 07/03/2014    OA (osteoarthritis) 07/03/2014        Plan:     Sepsis 2/2 HAP:  - p/w fever, productive cough x 4d                     Yellow green sputum  - SIRS 2 -- T102F, SBP 60's                       QSOFA = 1                     S/p 3L NS boluses  - WBC 3.7                      Lactate neg                  UA with ketones  - CXR with ground glass c/w edema vs infection vs inflammation  - continue Duonebs Q4 and Q4 PRN with acapella                       Rapid Flu neg  - continue Vanc, Zosyn, Cipro                     BCx NGTD                UCx neg  - SpCx with few bacteria on gram stain                Continue empiric Taamiflu     Acute Kidney Injury, RESOLVED:  - Cr 1.4 on admit (baseline 0.7)                         UA with trace ketones  - s/p 3L NS in ED                     Holding home ACE                    urine lytes c/w prerenal     Pancytopenia:  - WBC 3.7; RBC 3.3;                         Unclear etiology, but some component of sepsis  - defer HIV, as suspicion is low                      PLT transiently low in past     Chronic Persistent A-Fib:  - CHADSVasc 5                     Rate controlled on exam  - Continue home Coumadin and ASA                      Restarting home Coreg 2/2 hyoptension     Chronic Diastolic Heart Failure with Pulmonary HTN:  - Nov 2015 ECHO with diastolic dysfxn and PAP ~80                   Volume down on initial exam  - s/p 3L NS in ED                   Restarting home Lasix and coreg  - Repeat ECHO with diastolic dysfunction and PAP 45     Essential HTN:  - hypotensive on admit                     S/p 3L NS boluses in ED  - SBP 70's on initial exam                       Holding home ACE  - restarting home coreg     Hx Iron Deficiency Anemia and B12 Deficiency:  - Hgb above baseline                     Repeat anemia studies c/w B12Def  - continue B12 supplementation      Hypocalcemia:  - Ca 8.1 this AM                Correct to WNL                 continue supplementation     PPx:  Coumadin  Diet: Dental Soft    Disposition: Pending continued clinical improvement    Home [place]  [facility] Internal Medicine -II  [facility] Medicine Service Team A    [facility] Medicine Hospitalist Pager numbers:   [facility] Hospitalist Medicine Team A (Jessica/Zelda): [phone]  [facility] Hospitalist Medicine Team B (Ching/Heidy):  [phone]

## 2017-02-08 NOTE — PROGRESS NOTES
Pt c/o sob. o2 sats 93-94% on 2 liters nasal cannula. Pt tachypenic. Expiratory wheezing and coarse breath sounds noted t/o all lung sloan. Dr Obregon notified new orders received for stat duoneb. Respiratory notified.

## 2017-02-08 NOTE — PROGRESS NOTES
Pt continues to c/o sob even after breathing treatment. o2 sats 97% on 2 liters nasal cannula. Respirations 22. Pt still remains extremely coarse and has expiratory wheezing. Dr Obregon notified. States she will come up to see patient.

## 2017-02-08 NOTE — PT/OT/SLP PROGRESS
Physical Therapy      Leahlorraine Bishop  MRN: [id number]    Patient not seen today secondary to Patient unwilling to participate, Patient fatigue. Will follow-up as able.    Avery Kwan, PT

## 2017-02-09 VITALS
RESPIRATION RATE: 18 BRPM | DIASTOLIC BLOOD PRESSURE: 73 MMHG | HEART RATE: 66 BPM | OXYGEN SATURATION: 97 % | WEIGHT: 101.13 LBS | HEIGHT: 58 IN | SYSTOLIC BLOOD PRESSURE: 118 MMHG | BODY MASS INDEX: 21.23 KG/M2 | TEMPERATURE: 98 F

## 2017-02-09 LAB
ALBUMIN SERPL BCP-MCNC: 2.5 G/DL
ALP SERPL-CCNC: 73 U/L
ALT SERPL W/O P-5'-P-CCNC: 17 U/L
ANION GAP SERPL CALC-SCNC: 8 MMOL/L
AST SERPL-CCNC: 29 U/L
BASOPHILS # BLD AUTO: 0 K/UL
BASOPHILS NFR BLD: 0 %
BILIRUB SERPL-MCNC: 0.8 MG/DL
BUN SERPL-MCNC: 4 MG/DL
CALCIUM SERPL-MCNC: 8 MG/DL
CHLORIDE SERPL-SCNC: 100 MMOL/L
CO2 SERPL-SCNC: 30 MMOL/L
CREAT SERPL-MCNC: 0.6 MG/DL
DIFFERENTIAL METHOD: ABNORMAL
EOSINOPHIL # BLD AUTO: 0 K/UL
EOSINOPHIL NFR BLD: 1.6 %
ERYTHROCYTE [DISTWIDTH] IN BLOOD BY AUTOMATED COUNT: 14.7 %
EST. GFR  (AFRICAN AMERICAN): >60 ML/MIN/1.73 M^2
EST. GFR  (NON AFRICAN AMERICAN): >60 ML/MIN/1.73 M^2
GLUCOSE SERPL-MCNC: 101 MG/DL
HCT VFR BLD AUTO: 33 %
HGB BLD-MCNC: 10.8 G/DL
LYMPHOCYTES # BLD AUTO: 0.8 K/UL
LYMPHOCYTES NFR BLD: 30.9 %
MAGNESIUM SERPL-MCNC: 1.3 MG/DL
MCH RBC QN AUTO: 31.5 PG
MCHC RBC AUTO-ENTMCNC: 32.7 %
MCV RBC AUTO: 96 FL
MONOCYTES # BLD AUTO: 0.5 K/UL
MONOCYTES NFR BLD: 20.9 %
NEUTROPHILS # BLD AUTO: 1.2 K/UL
NEUTROPHILS NFR BLD: 46.6 %
PLATELET # BLD AUTO: 107 K/UL
PMV BLD AUTO: 9.3 FL
POTASSIUM SERPL-SCNC: 3 MMOL/L
PROT SERPL-MCNC: 5.2 G/DL
RBC # BLD AUTO: 3.43 M/UL
SODIUM SERPL-SCNC: 138 MMOL/L
VANCOMYCIN TROUGH SERPL-MCNC: 5.4 UG/ML
WBC # BLD AUTO: 2.49 K/UL

## 2017-02-09 PROCEDURE — 63600175 PHARM REV CODE 636 W HCPCS: Performed by: STUDENT IN AN ORGANIZED HEALTH CARE EDUCATION/TRAINING PROGRAM

## 2017-02-09 PROCEDURE — 25000003 PHARM REV CODE 250: Performed by: STUDENT IN AN ORGANIZED HEALTH CARE EDUCATION/TRAINING PROGRAM

## 2017-02-09 PROCEDURE — 36415 COLL VENOUS BLD VENIPUNCTURE: CPT

## 2017-02-09 PROCEDURE — 83735 ASSAY OF MAGNESIUM: CPT

## 2017-02-09 PROCEDURE — 94664 DEMO&/EVAL PT USE INHALER: CPT

## 2017-02-09 PROCEDURE — 27000221 HC OXYGEN, UP TO 24 HOURS

## 2017-02-09 PROCEDURE — 63600175 PHARM REV CODE 636 W HCPCS: Performed by: INTERNAL MEDICINE

## 2017-02-09 PROCEDURE — 94640 AIRWAY INHALATION TREATMENT: CPT

## 2017-02-09 PROCEDURE — 94761 N-INVAS EAR/PLS OXIMETRY MLT: CPT

## 2017-02-09 PROCEDURE — 99900035 HC TECH TIME PER 15 MIN (STAT)

## 2017-02-09 PROCEDURE — 85025 COMPLETE CBC W/AUTO DIFF WBC: CPT

## 2017-02-09 PROCEDURE — 25000242 PHARM REV CODE 250 ALT 637 W/ HCPCS: Performed by: STUDENT IN AN ORGANIZED HEALTH CARE EDUCATION/TRAINING PROGRAM

## 2017-02-09 PROCEDURE — 94668 MNPJ CHEST WALL SBSQ: CPT

## 2017-02-09 PROCEDURE — 25000003 PHARM REV CODE 250: Performed by: INTERNAL MEDICINE

## 2017-02-09 PROCEDURE — 80053 COMPREHEN METABOLIC PANEL: CPT

## 2017-02-09 PROCEDURE — 80202 ASSAY OF VANCOMYCIN: CPT

## 2017-02-09 RX ORDER — LANOLIN ALCOHOL/MO/W.PET/CERES
1000 CREAM (GRAM) TOPICAL DAILY
Qty: 90 TABLET | Refills: 3 | Status: ON HOLD | OUTPATIENT
Start: 2017-02-09 | End: 2017-10-29 | Stop reason: HOSPADM

## 2017-02-09 RX ORDER — POTASSIUM CHLORIDE 20 MEQ/15ML
20 SOLUTION ORAL
Status: COMPLETED | OUTPATIENT
Start: 2017-02-09 | End: 2017-02-09

## 2017-02-09 RX ORDER — FERROUS SULFATE 325(65) MG
325 TABLET, DELAYED RELEASE (ENTERIC COATED) ORAL
Qty: 270 TABLET | Refills: 3 | Status: ON HOLD | OUTPATIENT
Start: 2017-02-09 | End: 2017-10-29 | Stop reason: HOSPADM

## 2017-02-09 RX ORDER — MOXIFLOXACIN HYDROCHLORIDE 400 MG/1
400 TABLET ORAL DAILY
Qty: 3 TABLET | Refills: 0 | Status: SHIPPED | OUTPATIENT
Start: 2017-02-09 | End: 2017-02-12

## 2017-02-09 RX ORDER — OSELTAMIVIR PHOSPHATE 30 MG/1
30 CAPSULE ORAL DAILY
Qty: 1 CAPSULE | Refills: 0 | Status: SHIPPED | OUTPATIENT
Start: 2017-02-09 | End: 2017-02-10

## 2017-02-09 RX ADMIN — PIPERACILLIN SODIUM AND TAZOBACTAM SODIUM 4.5 G: 4; .5 INJECTION, POWDER, FOR SOLUTION INTRAVENOUS at 01:02

## 2017-02-09 RX ADMIN — PANTOPRAZOLE SODIUM 600 MG: 40 TABLET, DELAYED RELEASE ORAL at 08:02

## 2017-02-09 RX ADMIN — POTASSIUM CHLORIDE 20 MEQ: 20 SOLUTION ORAL at 10:02

## 2017-02-09 RX ADMIN — ONDANSETRON 8 MG: 8 TABLET, ORALLY DISINTEGRATING ORAL at 05:02

## 2017-02-09 RX ADMIN — CARVEDILOL 25 MG: 25 TABLET, FILM COATED ORAL at 08:02

## 2017-02-09 RX ADMIN — ASPIRIN 81 MG: 81 TABLET, COATED ORAL at 08:02

## 2017-02-09 RX ADMIN — CALCIUM 500 MG: 500 TABLET ORAL at 08:02

## 2017-02-09 RX ADMIN — FUROSEMIDE 40 MG: 40 TABLET ORAL at 08:02

## 2017-02-09 RX ADMIN — TRAMADOL HYDROCHLORIDE 50 MG: 50 TABLET, COATED ORAL at 03:02

## 2017-02-09 RX ADMIN — ONDANSETRON 8 MG: 8 TABLET, ORALLY DISINTEGRATING ORAL at 02:02

## 2017-02-09 RX ADMIN — CYANOCOBALAMIN 1000 MCG: 1000 INJECTION, SOLUTION INTRAMUSCULAR; SUBCUTANEOUS at 08:02

## 2017-02-09 RX ADMIN — OSELTAMIVIR PHOSPHATE 30 MG: 30 CAPSULE ORAL at 08:02

## 2017-02-09 RX ADMIN — IPRATROPIUM BROMIDE AND ALBUTEROL SULFATE 3 ML: .5; 3 SOLUTION RESPIRATORY (INHALATION) at 03:02

## 2017-02-09 RX ADMIN — IPRATROPIUM BROMIDE AND ALBUTEROL SULFATE 3 ML: .5; 3 SOLUTION RESPIRATORY (INHALATION) at 07:02

## 2017-02-09 RX ADMIN — POTASSIUM CHLORIDE 20 MEQ: 20 SOLUTION ORAL at 11:02

## 2017-02-09 RX ADMIN — IPRATROPIUM BROMIDE AND ALBUTEROL SULFATE 3 ML: .5; 3 SOLUTION RESPIRATORY (INHALATION) at 12:02

## 2017-02-09 RX ADMIN — CALCIUM 500 MG: 500 TABLET ORAL at 11:02

## 2017-02-09 NOTE — PLAN OF CARE
Problem: Patient Care Overview  Goal: Plan of Care Review  Patient on oxygen with documented flow. Will continue to monitor.   Outcome: Ongoing (interventions implemented as appropriate)  No sign breakdown noted, patient repositions self.   Lungs sounds still coarse, continue with Resp TX., medications, and oxygen.  Fall precautions maintained, bed alarm on.  Telemetry with Afib.  Possible discharge for this AM.

## 2017-02-09 NOTE — PROGRESS NOTES
[facility] Medicine Resident JESS Progress Note    Subjective:      Patient feeling well this morning but reports anxiety overnight that kept her from sleeping. Denies shortness of breath this morning. Reported feeling nauseous overnight with no vomiting and resolved with zofran.    Objective:   Last 24 Hour Vital Signs:  BP  Min: 109/57  Max: 179/92  Temp  Av.8 °F (36.6 °C)  Min: 97.4 °F (36.3 °C)  Max: 98.1 °F (36.7 °C)  Pulse  Av.2  Min: 60  Max: 79  Resp  Av.5  Min: 14  Max: 22  SpO2  Av.7 %  Min: 94 %  Max: 97 %  I/O last 3 completed shifts:  In:  [P.O.:240; IV Piggyback:300]  Out:  [Urine:; Stool:1]    Physical Examination:  General: Alert and awake in NAD; NC in place  HENT:  NCAT; anicteric sclera; OP clear with MMM  Cardio:  Regular rate and rhythm with normal S1 and S2; no murmurs  Resp:  diffuse rhonchi with improvement in basiliar crackles; no wheezes   Abdom: Soft, NTND with normoactive bowel sounds  Extrem: WWP with no edema  Pulses: 2+ and symmetric distally  Neuro:  AAOx3; cooperative and pleasant with no focal deficits      Laboratory:  Laboratory Data Reviewed: yes  Pertinent Findings:  Lab Results   Component Value Date    WBC 2.49 (L) 2017    HGB 10.8 (L) 2017    HCT 33.0 (L) 2017    MCV 96 2017     (L) 2017     Lab Results   Component Value Date    CREATININE 0.6 2017    BUN 4 (L) 2017     2017    K 3.0 (L) 2017     2017    CO2 30 (H) 2017     Lab Results   Component Value Date    ALT 17 2017    AST 29 2017    ALKPHOS 73 2017    BILITOT 0.8 2017         Microbiology Data Reviewed: yes  Pertinent Findings:   SpCx: few G+ cocci, rare G- rods on gram stain   UCx: neg   BCx: NGTD    Other Results:    Radiology Data Reviewed: yes  Pertinent Findings:  Imaging Results         X-Ray Chest PA And Lateral (Final result) Result time:  17 16:57:46     Final result by Clifton Pappas MD (02/06/17 16:57:46)    Impression:     As above.      Electronically signed by: CLIFTON PAPPAS MD  Date:     02/06/17  Time:    16:57     Narrative:    Chest PA lateral.    Findings: 2 views.  There are bilateral perihilar opacities with interstitial prominence as seen on previous examination performed on same day.  There is no pneumothorax.  The remainder of the examination is unchanged.            X-Ray Chest 1 View (Final result) Result time:  02/06/17 02:11:15    Final result by Skip Askew MD (02/06/17 02:11:15)    Impression:        Perihilar reticular opacities and mild groundglass attenuation of the lung parenchyma suggesting edema or an infectious/inflammatory process.      Electronically signed by: SKIP ASKEW MD  Date:     02/06/17  Time:    02:11     Narrative:    Technique: AP chest radiograph.    Comparison: Radiograph 05/20/2016.    Findings:    Mediastinal structures are midline there cardiac silhouette is magnified by AP technique but appears slightly enlarged.  There is calcification of the aortic arch.  Lung volumes are symmetric.  Perihilar reticular opacities and mild groundglass attenuation of the lung parenchyma identified.  No focal consolidation.  No pneumothorax or pleural effusions.  There is generalized osteopenia without acute osseous abnormalities.  Advanced degenerative changes of the right glenohumeral joint with findings suggesting chronic rotator cuff tear.  No free air beneath the diaphragm.                Current Medications:     Infusions:        Scheduled:   albuterol-ipratropium 2.5mg-0.5mg/3mL  3 mL Nebulization Q4H    aspirin  81 mg Oral Daily    calcium carbonate  500 mg Oral TID WM    carvedilol  25 mg Oral BID WM    ciprofloxacin HCl  500 mg Oral Daily    cyanocobalamin  1,000 mcg Intramuscular Daily    furosemide  40 mg Oral Daily    guaifenesin  600 mg Oral BID    oseltamivir  30 mg Oral Daily    piperacillin-tazobactam  4.5 g in dextrose 5 % 100 mL IVPB (ready to mix system)  4.5 g Intravenous Q8H    sodium chloride 3%  4 mL Nebulization Once    vancomycin (VANCOCIN) IVPB  1,000 mg Intravenous Q24H    warfarin  3 mg Oral Daily        PRN:  benzonatate, ondansetron, tramadol    Antibiotics and Day Number of Therapy:  Cipro 400mg q24 hr (2/6-present)  Zosyn 4.5g q12hr (2/6-present)  Vanc 750mg q24hr (2/6-present)    Lines and Day Number of Therapy:  PIV    Assessment:     Jazmín Bishop is a 81 y.o.female with  Patient Active Problem List    Diagnosis Date Noted    Iron deficiency anemia secondary to inadequate dietary iron intake 02/08/2017    Chronic diastolic heart failure 02/06/2017    Sepsis due to pneumonia 02/06/2017    NGUYEN (acute kidney injury) 02/06/2017    Pancytopenia 02/06/2017    Persistent atrial fibrillation 02/06/2017    Acute febrile illness 02/06/2017    Dehydration 02/06/2017    Hypotension 02/06/2017    Iron deficiency anemia 03/04/2016    Mitral valve prolapse 06/23/2015    Pulmonary hypertension 06/23/2015    Coronary artery disease due to lipid rich plaque 06/23/2015    MR (mitral regurgitation) 06/23/2015    PVC's (premature ventricular contractions) 06/23/2015    HTN (hypertension) 07/03/2014    OA (osteoarthritis) 07/03/2014        Plan:     Sepsis 2/2 HAP:  - p/w fever, productive cough x 4d                     Yellow green sputum  - SIRS 2 -- T102F, SBP 60's                       QSOFA = 1                     S/p 3L NS boluses  - WBC 3.7                      Lactate neg                  UA with ketones  - CXR with ground glass c/w edema vs infection vs inflammation  - continue Duonebs Q4 and Q4 PRN with acapella                       Rapid Flu neg  - continue Vanc, Zosyn, Cipro                     BCx NGTD                UCx neg  - SpCx with few bacteria on gram stain                Continue empiric Tamiflu     Acute Kidney Injury, RESOLVED:  - Cr 1.4 on admit (baseline  0.7)                         UA with trace ketones  - s/p 3L NS in ED                     Holding home ACE                    urine lytes c/w prerenal     Pancytopenia:  - WBC 3.7; RBC 3.3;                        Unclear etiology, but some component of sepsis  - defer HIV, as suspicion is low                      PLT transiently low in past     Chronic Persistent A-Fib:  - CHADSVasc 5                     Rate controlled on exam  - Continue home Coumadin and ASA                      Restarting home Coreg 2/2 hyoptension     Chronic Diastolic Heart Failure with Pulmonary HTN:  - Nov 2015 ECHO with diastolic dysfxn and PAP ~80                   Volume down on initial exam  - s/p 3L NS in ED                   Restarting home Lasix and coreg  - Repeat ECHO with diastolic dysfunction and PAP 45     Essential HTN:  - hypotensive on admit                     S/p 3L NS boluses in ED  - SBP 70's on initial exam                       Holding home ACE  - restarting home coreg     Hx Iron Deficiency Anemia and B12 Deficiency:  - Hgb above baseline                     Repeat anemia studies c/w B12Def  - continue B12 supplementation      Hypocalcemia:  - Ca 8.0 this AM                Correct to WNL                 continue supplementation     PPx:  Coumadin  Diet: Dental Soft    Disposition: Pending continued clinical improvement    Jessy Choudhary  [facility] Internal Medicine HO-I  [facility] Medicine Service Team A    [facility] Medicine Hospitalist Pager numbers:   [facility] Hospitalist Medicine Team A (Jessica/Zelda): [phone]  [facility] Hospitalist Medicine Team B (Ching/Heidy):  [phone]

## 2017-02-09 NOTE — PLAN OF CARE
Problem: Patient Care Overview  Goal: Plan of Care Review  Patient on oxygen with documented flow. Will continue to monitor.   SpO2   94% on  1 lpm NC. No apparent distress noted. Will continue to monitor.

## 2017-02-09 NOTE — PLAN OF CARE
Problem: Patient Care Overview  Goal: Plan of Care Review  Patient on oxygen with documented flow. Will continue to monitor.   Outcome: Ongoing (interventions implemented as appropriate)  Patient given aerosol treatment with no adverse reactions noted.  Patient instructed on proper use.  Patient on oxygen with documented flow.  Will attempt to wean per O2 order protocol.  Will continue to monitor.

## 2017-02-09 NOTE — DISCHARGE SUMMARY
[facility] Internal Medicine Discharge Summary    Primary Team: [facility] Internal Medicine  Attending Physician: Benjamín Lopez MD  Resident: Julieth  Intern: Funmi    Date of Admit: 2/6/2017  Date of Discharge: 2/9/2017    Discharge to: [facility]  Condition: stable    Discharge Diagnoses     Patient Active Problem List   Diagnosis    HTN (hypertension)    OA (osteoarthritis)    Mitral valve prolapse    Pulmonary hypertension    Coronary artery disease due to lipid rich plaque    MR (mitral regurgitation)    PVC's (premature ventricular contractions)    Iron deficiency anemia    Chronic diastolic heart failure    Sepsis due to pneumonia    NGUYEN (acute kidney injury)    Pancytopenia    Persistent atrial fibrillation    Acute febrile illness    Dehydration    Hypotension    Iron deficiency anemia secondary to inadequate dietary iron intake       Consultants and Procedures     Consultants:  none    Procedures:   none    Brief History of Present Illness      Jazmín Bishop is a 81 y.o. female who  has a past medical history of Arthritis; Cataract; CHF (congestive heart failure); Chicken pox; Hypertension; and Ulcer.  The patient presented to [facility] on 2/6/2017 with a primary complaint of Abdominal Pain (sent from [employer] home for eval of abdominal distention with an episode of vomting yesterday. also has had cough. reported fever x2 days. ) and Cough    Presented from [facility] for fever and cough. Pt endorses 3-4 days of cough productive of yellow-green sputum with subjective fevers and chills. Cough is associated with subcostal discomfort. Pt denied diarrhea, nausea, emesis, urinary complaints, myalgias, sore throat.    For the full HPI please refer to the History & Physical from this admission.    Hospital Course By Problem with Pertinent Findings     Sepsis 2/2 HAP:  - p/w fever, productive cough x 4d  Yellow green sputum  - SIRS 2 -- T102F, SBP 60's  QSOFA = 1  S/p  3L NS boluses  - WBC 3.7  Lactate neg  UA with ketones  - CXR with ground glass c/w edema vs infection vs inflammation  - continue Duonebs Q4 and Q4 PRN with acapella  Rapid Flu neg  - continued Vanc, Zosyn, Cipro while inpatient  BCx NGTD  UCx neg  transitioned to moxifloxacin on discharge  - SpCx with few bacteria on gram stain  Continue empiric Tamiflu  - Follow up with PCP      Acute Kidney Injury, RESOLVED:  - Cr 1.4 on admit (baseline 0.7)  UA with trace ketones  - s/p 3L NS in ED  Holding home ACE  urine lytes c/w prerenal      Pancytopenia:  - WBC 3.7; RBC 3.3;   Unclear etiology, but some component of sepsis  - defer HIV, as suspicion is low  PLT transiently low in past      Chronic Persistent A-Fib:  - CHADSVasc 5  Rate controlled on exam  - Continue home Coumadin and ASA  Restarting home Coreg 2/2 hyoptension      Chronic Diastolic Heart Failure with Pulmonary HTN:  - Nov 2015 ECHO with diastolic dysfxn and PAP ~80  Volume down on initial exam  - s/p 3L NS in ED  Restarting home Lasix and coreg  - Repeat ECHO with diastolic dysfunction and PAP 45      Essential HTN:  - hypotensive on admit  S/p 3L NS boluses in ED  - SBP 70's on initial exam  Holding home ACE  - restarting home coreg      Hx Iron Deficiency Anemia and B12 Deficiency:  - Hgb above baseline  Repeat anemia studies c/w B12Def  - continue B12 supplementation and iron      Hypocalcemia:  - Ca 8.0 this AM  Correct to WNL        Discharge Medications        Medication List      START taking these medications          cyanocobalamin 1000 MCG tablet   Commonly known as:  VITAMIN B-12   Take 1 tablet (1,000 mcg total) by mouth once daily.       ferrous sulfate 325 (65 FE) MG EC tablet   Take 1 tablet (325 mg total) by mouth 3 (three) times daily with meals.       moxifloxacin 400 mg tablet   Commonly known as:  AVELOX   Take 1 tablet (400 mg total) by mouth once daily.       oseltamivir 30 MG capsule   Commonly known as:   TAMIFLU   Take 1 capsule (30 mg total) by mouth once daily.         CONTINUE taking these medications          aspirin 81 MG EC tablet   Commonly known as:  ECOTRIN       carvedilol 25 MG tablet   Commonly known as:  COREG   Take 1 tablet (25 mg total) by mouth 2 (two) times daily with meals.       ferrous gluconate 324 MG tablet   Commonly known as:  FERGON   Take 1 tablet (324 mg total) by mouth 3 (three) times daily with meals.       furosemide 40 MG tablet   Commonly known as:  LASIX   Take 1 tablet (40 mg total) by mouth once daily.       hydrOXYzine pamoate 25 MG Cap   Commonly known as:  VISTARIL   Take 1 capsule (25 mg total) by mouth nightly as needed.       lisinopril 20 MG tablet   Commonly known as:  PRINIVIL,ZESTRIL   Take 2 tablets (40 mg total) by mouth nightly.       methocarbamol 500 MG Tab   Commonly known as:  ROBAXIN   Take 1 tablet (500 mg total) by mouth 2 (two) times daily as needed.       promethazine 25 MG tablet   Commonly known as:  PHENERGAN   Take 1 tablet (25 mg total) by mouth daily as needed for Nausea.       sucralfate 1 gram tablet   Commonly known as:  CARAFATE       tramadol 50 mg tablet   Commonly known as:  ULTRAM       ursodiol 300 mg capsule   Commonly known as:  ACTIGALL   Take 1 capsule (300 mg total) by mouth 2 (two) times daily.       warfarin 3 MG tablet   Commonly known as:  COUMADIN   Take 1 tablet (3 mg total) by mouth Daily.            Where to Get Your Medications      You can get these medications from any pharmacy     Bring a paper prescription for each of these medications     cyanocobalamin 1000 MCG tablet    ferrous sulfate 325 (65 FE) MG EC tablet    moxifloxacin 400 mg tablet    oseltamivir 30 MG capsule             Discharge Information:   Diet:  Cardiac, soft dental    Physical Activity:  As tolerated    Instructions:  1. Take all medications as prescribed  2. Keep all follow-up appointments  3. Return to the hospital or call your primary care physicians  if any worsening symptoms such as shortness of breath, chest pain, or any other conserning symptoms occur.    Follow-Up Appointments:  Follow-up Information     Follow up with Luis Butcher MD In 1 week.    Specialty:  Family Medicine    Why:  For follow up from recent hospitalization    Contact information:    [address] [place] [address] [phone]            Jessy Choudhary  hospitals Internal Medicine, -

## 2017-02-09 NOTE — PLAN OF CARE
Pt is being discharged.  Refer to clinical references for pt education. IV removed, tip intact, pt tolerated well.  Awaiting transport set up to be discharged back to [facility] home.

## 2017-02-09 NOTE — PLAN OF CARE
TN confirmed with GRETEL Collins ([phone]) discharge  orders and prescriptions received. Stella informed Tn pt returning to room 209B and they will send their own transport to  pt. TN informed floor nurse JEFF Hawkins of above. Discharge packet with prescriptions left at nurse's station to be given to transporter. TN had to leave message for PCP follow up within a week.     02/09/17 1354   Final Note   Assessment Type Final Discharge Note   Discharge Disposition Home   Discharge planning education complete? Yes   What phone number can be called within the next 1-3 days to see how you are doing after discharge? [phone]   Hospital Follow Up  Appt(s) scheduled? (message keft for follow up witnin a week)   Offered [facility] -- Bedside Delivery? n/a   Discharge/Hospital Encounter Summary to ([facility]) PCP n/a   Referral to Outpatient Case Management complete? n/a   Referral to / orders for Home Health Complete? n/a   30 day supply of medicines given at discharge, if documented non-compliance / non-adherence? n/a   Any social issues identified prior to discharge? No   Did you assess the readiness or willingness of the family or caregiver to support self management of care? Yes   Right Care Referral Info   Post Acute Recommendation Other   Referral Type return to NH   [facility] Name [facility]

## 2017-02-09 NOTE — PLAN OF CARE
Pt wheeled off with transport and pt belongings. Transport packet left in pt chart slot. Susan notified and stated just to hold onto packet in case [facility] calls.

## 2017-02-09 NOTE — PT/OT/SLP PROGRESS
Physical Therapy Discharge      Jazmín Bishop  MRN: [id number]  Time 930    Patient not seen today patient refusal. Patient has refused 4 days in a row. Will DC PT service at this time. Medical team is aware.     Avery Kwan, PT

## 2017-02-09 NOTE — PLAN OF CARE
[facility]. Allotted time given for questions. Stated transport from [facility] is set up and should be on their way.

## 2017-02-09 NOTE — PLAN OF CARE
Problem: Patient Care Overview  Goal: Plan of Care Review  Patient on oxygen with documented flow. Will continue to monitor.   Outcome: Ongoing (interventions implemented as appropriate)  Reviewed plan of care with patient. Patient verbalized understanding. AAOx3. Pt on 2L NC. Pt tolerates meds crushed in applesauce. Able to reposition self and ambulates to bedside commode. PRN tramadol given for pain to right shoulder x2 today. Moderate relief obtained. Patient on continuous tele monitoring - Afib on monitor; HR 50-70s. No other distress noted at this time.  Bed alarm set, bed in lowest position, call bell in reach. Will continue to monitor.

## 2017-02-09 NOTE — PROGRESS NOTES
TN faxed orders to GRETEL Collins at [facility] fax # [phone] as requested

## 2017-02-09 NOTE — PLAN OF CARE
[facility]    FACILITY TRANSFER ORDERS      Patient Name: Jazmín Bishop  YOB: 1935    PCP: Luis Butcher MD   PCP Address: [address] / [place] [address]  PCP Phone Number: [phone]  PCP Fax: [phone]    Encounter Date: 02/09/2017    [facility]    Vital Signs:  Routine    Diagnoses:   Active Hospital Problems    Diagnosis  POA    *Sepsis due to pneumonia [J18.9, A41.9]  Yes    Iron deficiency anemia secondary to inadequate dietary iron intake [D50.8]  Yes    Chronic diastolic heart failure [I50.32]  Yes    NGUYEN (acute kidney injury) [N17.9]  Yes    Pancytopenia [D61.818]  Yes    Persistent atrial fibrillation [I48.1]  Yes    Acute febrile illness [R50.9]  Yes    Dehydration [E86.0]  Yes    Hypotension [I95.9]  Yes    Iron deficiency anemia [D50.9]  Yes    Pulmonary hypertension [I27.2]  Yes    HTN (hypertension) [I10]  Yes    OA (osteoarthritis) [M19.90]  Yes      Resolved Hospital Problems    Diagnosis Date Resolved POA   No resolved problems to display.       Allergies:  Review of patient's allergies indicates:   Allergen Reactions    Codeine sulfate Other (See Comments)     CONFUSION         Diet: soft diet, cardiac    Activities: Activity as tolerated    Nursing: administration of medication     Labs: none    CONSULTS:    Physical Therapy to evaluate and treat 3 times a week.  and Occupational Therapy to evaluate and treat 3 times a week.    MISCELLANEOUS CARE:      WOUND CARE ORDERS  None    Medications: Review discharge medications with patient and family and provide education.      Current Discharge Medication List      START taking these medications    Details   cyanocobalamin (VITAMIN B-12) 1000 MCG tablet Take 1 tablet (1,000 mcg total) by mouth once daily.  Qty: 90 tablet, Refills: 3      ferrous sulfate 325 (65 FE) MG EC tablet Take 1 tablet (325 mg total) by mouth 3 (three) times daily with meals.  Qty: 270 tablet, Refills:  3      moxifloxacin (AVELOX) 400 mg tablet Take 1 tablet (400 mg total) by mouth once daily.  Qty: 3 tablet, Refills: 0; last dose 2/12/2017      oseltamivir (TAMIFLU) 30 MG capsule Take 1 capsule (30 mg total) by mouth once daily.  Qty: 1 capsule, Refills: 0; last dose 2/10/2017         CONTINUE these medications which have NOT CHANGED    Details   aspirin (ECOTRIN) 81 MG EC tablet Take 81 mg by mouth once daily.      carvedilol (COREG) 25 MG tablet Take 1 tablet (25 mg total) by mouth 2 (two) times daily with meals.  Qty: 180 tablet, Refills: 4      ferrous gluconate (FERGON) 324 MG tablet Take 1 tablet (324 mg total) by mouth 3 (three) times daily with meals.  Qty: 90 tablet, Refills: 3      furosemide (LASIX) 40 MG tablet Take 1 tablet (40 mg total) by mouth once daily.  Qty: 30 tablet, Refills: 6    Associated Diagnoses: Essential hypertension; Chronic diastolic congestive heart failure      hydrOXYzine pamoate (VISTARIL) 25 MG Cap Take 1 capsule (25 mg total) by mouth nightly as needed.  Qty: 30 capsule, Refills: 3    Associated Diagnoses: Psychophysiological insomnia      lisinopril (PRINIVIL,ZESTRIL) 20 MG tablet Take 2 tablets (40 mg total) by mouth nightly.  Qty: 180 tablet, Refills: 5      methocarbamol (ROBAXIN) 500 MG Tab Take 1 tablet (500 mg total) by mouth 2 (two) times daily as needed.  Qty: 60 tablet, Refills: 3    Associated Diagnoses: Muscle spasms of neck      promethazine (PHENERGAN) 25 MG tablet Take 1 tablet (25 mg total) by mouth daily as needed for Nausea.  Qty: 30 tablet, Refills: 3    Associated Diagnoses: Chronic nausea      sucralfate (CARAFATE) 1 gram tablet Take 1 g by mouth 2 (two) times daily.      tramadol (ULTRAM) 50 mg tablet Take 50 mg by mouth every 8 (eight) hours as needed for Pain.      ursodiol (ACTIGALL) 300 mg capsule Take 1 capsule (300 mg total) by mouth 2 (two) times daily.  Qty: 60 capsule, Refills: 11      warfarin (COUMADIN) 3 MG tablet Take 1 tablet (3 mg total) by  mouth Daily.  Qty: 30 tablet, Refills: 6                  _________________________________  Jessy Choudhary MD  02/09/2017

## 2017-02-09 NOTE — PLAN OF CARE
Pt IV infiltrated. Notified MD team as pt is hard stick. MD stated to hold off as she may be discharged today. Will continue to monitor.     Dr. Lopez and team rounding on patient. Stated okay to leave IV out.

## 2017-02-09 NOTE — PLAN OF CARE
Updated clinicals sent to [facility] [place] via [facility]. Tn to continue to follow.    1100- Tn was notified pt will be ready for discharge today; TN requested orders to be written ASA. Tn attempted too notify Ms. Naylor and [facility] but she is off today so GRETEL Collins at [facility] informed. Dennis informed Tn they will have someone  pt when ready.    TN will send orders once written.       02/09/17 0820   Discharge Reassessment   Assessment Type Discharge Planning Reassessment   Can the patient answer the patient profile reliably? Yes, cognitively intact   How does the patient rate their overall health at the present time? Fair   Describe the patient's ability to walk at the present time. No restrictions   How often would a person be available to care for the patient? Whenever needed   Number of comorbid conditions (as recorded on the chart) Two   During the past month, has the patient often been bothered by feeling down, depressed or hopeless? No   During the past month, has the patient often been bothered by little interest or pleasure in doing things? No   Discharge plan remains the same: Yes   Provided patient/caregiver education on the expected discharge date and the discharge plan Yes   Discharge Plan A Return to nursing home   Discharge Plan B Return to Nursing Home;Skilled Nursing Facility

## 2017-02-09 NOTE — DISCHARGE INSTRUCTIONS
SEPSIS, UNDERSTANDING (ENGLISH) View Edit Remove  SEPSIS (ENGLISH) View Edit Remove  ADULT, PNEUMONIA (ENGLISH) View Edit Remove  PNEUMONIA, WHAT IS (ENGLISH) View Edit Remove  HEART FAILURE, COPING WITH (ENGLISH) View Edit Remove  HEART FAILURE, DISCHARGE INSTRUCTIONS FOR (ENGLISH) View Edit Remove  HEART FAILURE, WHAT IS (ENGLISH) View Edit Remove  HEART FAILURE: BEING ACTIVE (ENGLISH) View Edit Remove  HEART FAILURE: EVALUATING YOUR HEART (ENGLISH) View Edit Remove  HEART FAILURE: MAKING CHANGES TO YOUR DIET (ENGLISH) View Edit Remove  HEART FAILURE: MEDICINES TO HELP YOUR HEART (ENGLISH) View Edit Remove  HEART FAILURE: PROCEDURES THAT MAY HELP (ENGLISH) View Edit Remove  HEART FAILURE: WARNING SIGNS OF A FLARE-UP (ENGLISH) View Edit Remove  HEART FAILURE: TRACKING YOUR WEIGHT (ENGLISH) View Edit Remove  HEART FAILURE: TAKING MEDICATION TO CONTROL (ENGLISH) View Edit Remove  MOXIFLOXACIN TABLETS (ENGLISH) View Edit Remove  OSELTAMIVIR CAPSULES (ENGLISH) View Edit Remove  CYANOCOBALAMIN, PYRIDOXINE, AND FOLATE (ENGLISH) View Edit Remove  IRON TABLETS, CAPSULES, EXTENDED-RELEASE TABLETS (ENGLISH) View Edit Remove

## 2017-02-11 LAB — BACTERIA BLD CULT: NORMAL

## 2017-03-09 ENCOUNTER — OUTSIDE PLACE OF SERVICE (OUTPATIENT)
Dept: FAMILY MEDICINE | Facility: CLINIC | Age: 82
End: 2017-03-09
Payer: MEDICARE

## 2017-03-09 PROCEDURE — 99308 SBSQ NF CARE LOW MDM 20: CPT | Mod: ,,, | Performed by: FAMILY MEDICINE

## 2017-04-19 ENCOUNTER — HOSPITAL ENCOUNTER (OUTPATIENT)
Dept: RADIOLOGY | Facility: HOSPITAL | Age: 82
Discharge: HOME | End: 2017-04-19
Attending: FAMILY MEDICINE
Payer: MEDICARE

## 2017-04-19 DIAGNOSIS — I50.20 HEART FAILURE, SYSTOLIC: ICD-10-CM

## 2017-04-19 PROCEDURE — 99308 SBSQ NF CARE LOW MDM 20: CPT | Mod: ,,, | Performed by: FAMILY MEDICINE

## 2017-04-19 PROCEDURE — 71020 XR CHEST PA AND LATERAL: CPT | Mod: TC,PO

## 2017-05-11 ENCOUNTER — OUTSIDE PLACE OF SERVICE (OUTPATIENT)
Dept: ADMINISTRATIVE | Facility: OTHER | Age: 82
End: 2017-05-11
Payer: MEDICARE

## 2017-06-14 ENCOUNTER — OUTSIDE PLACE OF SERVICE (OUTPATIENT)
Dept: ADMINISTRATIVE | Facility: OTHER | Age: 82
End: 2017-06-14
Payer: MEDICARE

## 2017-06-14 PROCEDURE — 99308 SBSQ NF CARE LOW MDM 20: CPT | Mod: ,,, | Performed by: FAMILY MEDICINE

## 2017-06-22 DIAGNOSIS — R19.7 DIARRHEA: Primary | ICD-10-CM

## 2017-06-27 ENCOUNTER — HOSPITAL ENCOUNTER (OUTPATIENT)
Dept: RADIOLOGY | Facility: HOSPITAL | Age: 82
Discharge: HOME | End: 2017-06-27
Attending: FAMILY MEDICINE
Payer: MEDICARE

## 2017-06-27 DIAGNOSIS — R19.7 DIARRHEA: ICD-10-CM

## 2017-06-27 PROCEDURE — 74020 XR ABDOMEN FLAT AND ERECT: CPT | Mod: TC,PO

## 2017-07-12 ENCOUNTER — OUTSIDE PLACE OF SERVICE (OUTPATIENT)
Dept: ADMINISTRATIVE | Facility: OTHER | Age: 82
End: 2017-07-12

## 2017-08-04 ENCOUNTER — OUTSIDE PLACE OF SERVICE (OUTPATIENT)
Dept: ADMINISTRATIVE | Facility: OTHER | Age: 82
End: 2017-08-04

## 2017-09-13 ENCOUNTER — OUTSIDE PLACE OF SERVICE (OUTPATIENT)
Dept: ADMINISTRATIVE | Facility: OTHER | Age: 82
End: 2017-09-13
Payer: MEDICARE

## 2017-09-13 PROCEDURE — 99308 SBSQ NF CARE LOW MDM 20: CPT | Mod: ,,, | Performed by: FAMILY MEDICINE

## 2017-09-21 ENCOUNTER — HOSPITAL ENCOUNTER (INPATIENT)
Facility: HOSPITAL | Age: 82
LOS: 4 days | Discharge: NURSING, REHAB OR LONG-TERM CARE | DRG: 871 | End: 2017-09-25
Attending: EMERGENCY MEDICINE | Admitting: HOSPITALIST
Payer: MEDICARE

## 2017-09-21 DIAGNOSIS — K85.10 ACUTE BILIARY PANCREATITIS, UNSPECIFIED COMPLICATION STATUS: ICD-10-CM

## 2017-09-21 DIAGNOSIS — I50.32 CHRONIC DIASTOLIC HEART FAILURE: Chronic | ICD-10-CM

## 2017-09-21 DIAGNOSIS — R65.21 GRAM NEGATIVE SEPTIC SHOCK: Primary | ICD-10-CM

## 2017-09-21 DIAGNOSIS — R65.20 SEVERE SEPSIS: ICD-10-CM

## 2017-09-21 DIAGNOSIS — A41.50 GRAM NEGATIVE SEPTIC SHOCK: Primary | ICD-10-CM

## 2017-09-21 DIAGNOSIS — A41.9 SEVERE SEPSIS: ICD-10-CM

## 2017-09-21 DIAGNOSIS — I50.9 CHF (CONGESTIVE HEART FAILURE): ICD-10-CM

## 2017-09-21 DIAGNOSIS — R50.9 FEVER, UNSPECIFIED FEVER CAUSE: ICD-10-CM

## 2017-09-21 PROBLEM — K83.09 BILIARY SEPSIS: Status: ACTIVE | Noted: 2017-09-21

## 2017-09-21 PROBLEM — R79.1 SUPRATHERAPEUTIC INR: Status: ACTIVE | Noted: 2017-09-21

## 2017-09-21 PROBLEM — E86.0 DEHYDRATION: Status: RESOLVED | Noted: 2017-02-06 | Resolved: 2017-09-21

## 2017-09-21 PROBLEM — J18.9 SEPSIS DUE TO PNEUMONIA: Status: RESOLVED | Noted: 2017-02-06 | Resolved: 2017-09-21

## 2017-09-21 PROBLEM — I51.7 BIATRIAL ENLARGEMENT: Chronic | Status: ACTIVE | Noted: 2017-02-06

## 2017-09-21 PROBLEM — I48.19 PERSISTENT ATRIAL FIBRILLATION: Chronic | Status: ACTIVE | Noted: 2017-02-06

## 2017-09-21 PROBLEM — Z86.711 HISTORY OF PULMONARY EMBOLISM: Chronic | Status: ACTIVE | Noted: 2017-09-21

## 2017-09-21 PROBLEM — N17.9 AKI (ACUTE KIDNEY INJURY): Status: RESOLVED | Noted: 2017-02-06 | Resolved: 2017-09-21

## 2017-09-21 PROBLEM — E80.6 HYPERBILIRUBINEMIA: Status: ACTIVE | Noted: 2017-09-21

## 2017-09-21 PROBLEM — K92.0 HEMATEMESIS WITH NAUSEA: Status: ACTIVE | Noted: 2017-09-21

## 2017-09-21 PROBLEM — R74.01 TRANSAMINITIS: Status: ACTIVE | Noted: 2017-09-21

## 2017-09-21 PROBLEM — I95.9 HYPOTENSION: Status: RESOLVED | Noted: 2017-02-06 | Resolved: 2017-09-21

## 2017-09-21 LAB
ALBUMIN SERPL BCP-MCNC: 3 G/DL
ALP SERPL-CCNC: 403 U/L
ALT SERPL W/O P-5'-P-CCNC: 116 U/L
ANION GAP SERPL CALC-SCNC: 11 MMOL/L
APTT BLDCRRT: 34.9 SEC
AST SERPL-CCNC: 187 U/L
BASOPHILS # BLD AUTO: 0.02 K/UL
BASOPHILS NFR BLD: 0.2 %
BILIRUB SERPL-MCNC: 2.7 MG/DL
BILIRUB UR QL STRIP: NEGATIVE
BNP SERPL-MCNC: 533 PG/ML
BNP SERPL-MCNC: 533 PG/ML
BUN SERPL-MCNC: 17 MG/DL
CALCIUM SERPL-MCNC: 8.6 MG/DL
CHLORIDE SERPL-SCNC: 102 MMOL/L
CLARITY UR: CLEAR
CO2 SERPL-SCNC: 26 MMOL/L
COLOR UR: YELLOW
CREAT SERPL-MCNC: 0.7 MG/DL
DIFFERENTIAL METHOD: ABNORMAL
EOSINOPHIL # BLD AUTO: 0 K/UL
EOSINOPHIL NFR BLD: 0.3 %
ERYTHROCYTE [DISTWIDTH] IN BLOOD BY AUTOMATED COUNT: 15.3 %
EST. GFR  (AFRICAN AMERICAN): >60 ML/MIN/1.73 M^2
EST. GFR  (NON AFRICAN AMERICAN): >60 ML/MIN/1.73 M^2
GLUCOSE SERPL-MCNC: 137 MG/DL
GLUCOSE UR QL STRIP: NEGATIVE
HCT VFR BLD AUTO: 32.6 %
HGB BLD-MCNC: 10.4 G/DL
HGB UR QL STRIP: ABNORMAL
INR PPP: 3.5
KETONES UR QL STRIP: NEGATIVE
LACTATE SERPL-SCNC: 0.7 MMOL/L
LACTATE SERPL-SCNC: 0.9 MMOL/L
LEUKOCYTE ESTERASE UR QL STRIP: ABNORMAL
LIPASE SERPL-CCNC: >1000 U/L
LYMPHOCYTES # BLD AUTO: 0.4 K/UL
LYMPHOCYTES NFR BLD: 4 %
MAGNESIUM SERPL-MCNC: 1.6 MG/DL
MCH RBC QN AUTO: 31.3 PG
MCHC RBC AUTO-ENTMCNC: 31.9 G/DL
MCV RBC AUTO: 98 FL
MICROSCOPIC COMMENT: NORMAL
MONOCYTES # BLD AUTO: 0.5 K/UL
MONOCYTES NFR BLD: 4.9 %
NEUTROPHILS # BLD AUTO: 9.5 K/UL
NEUTROPHILS NFR BLD: 90.4 %
NITRITE UR QL STRIP: NEGATIVE
OB PNL STL: POSITIVE
PH UR STRIP: 6 [PH] (ref 5–8)
PHOSPHATE SERPL-MCNC: 2.9 MG/DL
PLATELET # BLD AUTO: 201 K/UL
PMV BLD AUTO: 9.5 FL
POTASSIUM SERPL-SCNC: 3.8 MMOL/L
PROCALCITONIN SERPL IA-MCNC: 0.59 NG/ML
PROT SERPL-MCNC: 6.1 G/DL
PROT UR QL STRIP: NEGATIVE
PROTHROMBIN TIME: 35.3 SEC
RBC # BLD AUTO: 3.32 M/UL
RBC #/AREA URNS HPF: 3 /HPF (ref 0–4)
SODIUM SERPL-SCNC: 139 MMOL/L
SP GR UR STRIP: <=1.005 (ref 1–1.03)
TROPONIN I SERPL DL<=0.01 NG/ML-MCNC: 0.02 NG/ML
TSH SERPL DL<=0.005 MIU/L-ACNC: 1.14 UIU/ML
URN SPEC COLLECT METH UR: ABNORMAL
UROBILINOGEN UR STRIP-ACNC: 1 EU/DL
WBC # BLD AUTO: 10.54 K/UL
WBC #/AREA URNS HPF: 2 /HPF (ref 0–5)

## 2017-09-21 PROCEDURE — 12000002 HC ACUTE/MED SURGE SEMI-PRIVATE ROOM

## 2017-09-21 PROCEDURE — 96368 THER/DIAG CONCURRENT INF: CPT

## 2017-09-21 PROCEDURE — 86870 RBC ANTIBODY IDENTIFICATION: CPT

## 2017-09-21 PROCEDURE — 81000 URINALYSIS NONAUTO W/SCOPE: CPT

## 2017-09-21 PROCEDURE — 84443 ASSAY THYROID STIM HORMONE: CPT

## 2017-09-21 PROCEDURE — 96365 THER/PROPH/DIAG IV INF INIT: CPT

## 2017-09-21 PROCEDURE — 83735 ASSAY OF MAGNESIUM: CPT

## 2017-09-21 PROCEDURE — 25000003 PHARM REV CODE 250: Performed by: EMERGENCY MEDICINE

## 2017-09-21 PROCEDURE — 96366 THER/PROPH/DIAG IV INF ADDON: CPT

## 2017-09-21 PROCEDURE — 86900 BLOOD TYPING SEROLOGIC ABO: CPT

## 2017-09-21 PROCEDURE — 82533 TOTAL CORTISOL: CPT

## 2017-09-21 PROCEDURE — 85730 THROMBOPLASTIN TIME PARTIAL: CPT

## 2017-09-21 PROCEDURE — 02HV33Z INSERTION OF INFUSION DEVICE INTO SUPERIOR VENA CAVA, PERCUTANEOUS APPROACH: ICD-10-PCS | Performed by: EMERGENCY MEDICINE

## 2017-09-21 PROCEDURE — 83880 ASSAY OF NATRIURETIC PEPTIDE: CPT

## 2017-09-21 PROCEDURE — 25000003 PHARM REV CODE 250

## 2017-09-21 PROCEDURE — 87077 CULTURE AEROBIC IDENTIFY: CPT

## 2017-09-21 PROCEDURE — 83605 ASSAY OF LACTIC ACID: CPT

## 2017-09-21 PROCEDURE — 87040 BLOOD CULTURE FOR BACTERIA: CPT

## 2017-09-21 PROCEDURE — 85025 COMPLETE CBC W/AUTO DIFF WBC: CPT

## 2017-09-21 PROCEDURE — 85610 PROTHROMBIN TIME: CPT

## 2017-09-21 PROCEDURE — 83690 ASSAY OF LIPASE: CPT

## 2017-09-21 PROCEDURE — 84100 ASSAY OF PHOSPHORUS: CPT

## 2017-09-21 PROCEDURE — 25000003 PHARM REV CODE 250: Performed by: NURSE PRACTITIONER

## 2017-09-21 PROCEDURE — 80053 COMPREHEN METABOLIC PANEL: CPT

## 2017-09-21 PROCEDURE — 99291 CRITICAL CARE FIRST HOUR: CPT | Mod: 25

## 2017-09-21 PROCEDURE — C9113 INJ PANTOPRAZOLE SODIUM, VIA: HCPCS | Performed by: NURSE PRACTITIONER

## 2017-09-21 PROCEDURE — 63600175 PHARM REV CODE 636 W HCPCS: Performed by: NURSE PRACTITIONER

## 2017-09-21 PROCEDURE — 63600175 PHARM REV CODE 636 W HCPCS: Performed by: EMERGENCY MEDICINE

## 2017-09-21 PROCEDURE — 93005 ELECTROCARDIOGRAM TRACING: CPT

## 2017-09-21 PROCEDURE — 84484 ASSAY OF TROPONIN QUANT: CPT

## 2017-09-21 PROCEDURE — 51702 INSERT TEMP BLADDER CATH: CPT

## 2017-09-21 PROCEDURE — 36556 INSERT NON-TUNNEL CV CATH: CPT

## 2017-09-21 PROCEDURE — 93010 ELECTROCARDIOGRAM REPORT: CPT | Mod: ,,, | Performed by: INTERNAL MEDICINE

## 2017-09-21 PROCEDURE — 96375 TX/PRO/DX INJ NEW DRUG ADDON: CPT

## 2017-09-21 PROCEDURE — 84145 PROCALCITONIN (PCT): CPT

## 2017-09-21 PROCEDURE — 86901 BLOOD TYPING SEROLOGIC RH(D): CPT

## 2017-09-21 PROCEDURE — 25500020 PHARM REV CODE 255: Performed by: EMERGENCY MEDICINE

## 2017-09-21 PROCEDURE — 86880 COOMBS TEST DIRECT: CPT

## 2017-09-21 PROCEDURE — 87186 SC STD MICRODIL/AGAR DIL: CPT

## 2017-09-21 PROCEDURE — 82272 OCCULT BLD FECES 1-3 TESTS: CPT

## 2017-09-21 RX ORDER — NOREPINEPHRINE BITARTRATE/D5W 16MG/250ML
0.05 PLASTIC BAG, INJECTION (ML) INTRAVENOUS CONTINUOUS
Status: DISCONTINUED | OUTPATIENT
Start: 2017-09-21 | End: 2017-09-23

## 2017-09-21 RX ORDER — HEPARIN SODIUM 5000 [USP'U]/ML
5000 INJECTION, SOLUTION INTRAVENOUS; SUBCUTANEOUS EVERY 8 HOURS
Status: CANCELLED | OUTPATIENT
Start: 2017-09-21

## 2017-09-21 RX ORDER — POTASSIUM CHLORIDE 750 MG/1
10 TABLET, EXTENDED RELEASE ORAL DAILY
Status: ON HOLD | COMMUNITY
End: 2017-10-29 | Stop reason: HOSPADM

## 2017-09-21 RX ORDER — SIMETHICONE 80 MG
80 TABLET,CHEWABLE ORAL EVERY 6 HOURS PRN
COMMUNITY

## 2017-09-21 RX ORDER — ACETAMINOPHEN 500 MG
500 TABLET ORAL EVERY 6 HOURS PRN
COMMUNITY

## 2017-09-21 RX ORDER — ACETAMINOPHEN 325 MG/1
650 TABLET ORAL
Status: COMPLETED | OUTPATIENT
Start: 2017-09-21 | End: 2017-09-21

## 2017-09-21 RX ORDER — PANTOPRAZOLE SODIUM 40 MG/1
40 TABLET, DELAYED RELEASE ORAL DAILY
COMMUNITY

## 2017-09-21 RX ORDER — NOREPINEPHRINE BITARTRATE/D5W 16MG/250ML
PLASTIC BAG, INJECTION (ML) INTRAVENOUS
Status: COMPLETED
Start: 2017-09-21 | End: 2017-09-21

## 2017-09-21 RX ORDER — MORPHINE SULFATE 2 MG/ML
2 INJECTION, SOLUTION INTRAMUSCULAR; INTRAVENOUS
Status: COMPLETED | OUTPATIENT
Start: 2017-09-21 | End: 2017-09-21

## 2017-09-21 RX ORDER — ASCORBIC ACID 500 MG
500 TABLET ORAL 3 TIMES DAILY
Status: ON HOLD | COMMUNITY
End: 2017-10-29 | Stop reason: CLARIF

## 2017-09-21 RX ORDER — ALUMINUM HYDROXIDE, MAGNESIUM HYDROXIDE, AND SIMETHICONE 2400; 240; 2400 MG/30ML; MG/30ML; MG/30ML
20 SUSPENSION ORAL EVERY 6 HOURS PRN
COMMUNITY

## 2017-09-21 RX ORDER — LOPERAMIDE HYDROCHLORIDE 2 MG/1
2 CAPSULE ORAL
COMMUNITY

## 2017-09-21 RX ORDER — IPRATROPIUM BROMIDE AND ALBUTEROL SULFATE 2.5; .5 MG/3ML; MG/3ML
3 SOLUTION RESPIRATORY (INHALATION) EVERY 6 HOURS PRN
COMMUNITY

## 2017-09-21 RX ORDER — DOCUSATE SODIUM 100 MG/1
100 CAPSULE, LIQUID FILLED ORAL DAILY
COMMUNITY

## 2017-09-21 RX ORDER — CEFEPIME HYDROCHLORIDE 2 G/50ML
2 INJECTION, SOLUTION INTRAVENOUS
Status: COMPLETED | OUTPATIENT
Start: 2017-09-21 | End: 2017-09-21

## 2017-09-21 RX ORDER — ONDANSETRON 4 MG/1
4 TABLET, FILM COATED ORAL EVERY 6 HOURS PRN
COMMUNITY

## 2017-09-21 RX ORDER — PANTOPRAZOLE SODIUM 40 MG/10ML
80 INJECTION, POWDER, LYOPHILIZED, FOR SOLUTION INTRAVENOUS ONCE
Status: COMPLETED | OUTPATIENT
Start: 2017-09-21 | End: 2017-09-21

## 2017-09-21 RX ORDER — LORATADINE 10 MG/1
10 TABLET ORAL DAILY
Status: ON HOLD | COMMUNITY
End: 2017-10-29 | Stop reason: HOSPADM

## 2017-09-21 RX ORDER — FENTANYL CITRATE 50 UG/ML
25 INJECTION, SOLUTION INTRAMUSCULAR; INTRAVENOUS ONCE
Status: COMPLETED | OUTPATIENT
Start: 2017-09-21 | End: 2017-09-21

## 2017-09-21 RX ORDER — HYOSCYAMINE SULFATE 0.125 MG
125 TABLET ORAL EVERY 6 HOURS PRN
Status: ON HOLD | COMMUNITY
End: 2017-10-29 | Stop reason: HOSPADM

## 2017-09-21 RX ORDER — DOCUSATE SODIUM 100 MG/1
100 CAPSULE, LIQUID FILLED ORAL 2 TIMES DAILY
COMMUNITY
End: 2017-09-21 | Stop reason: CLARIF

## 2017-09-21 RX ORDER — FLUTICASONE PROPIONATE 50 MCG
1 SPRAY, SUSPENSION (ML) NASAL DAILY
COMMUNITY

## 2017-09-21 RX ADMIN — MORPHINE SULFATE 2 MG: 2 INJECTION, SOLUTION INTRAMUSCULAR; INTRAVENOUS at 04:09

## 2017-09-21 RX ADMIN — Medication 16 MG: at 09:09

## 2017-09-21 RX ADMIN — SODIUM CHLORIDE 1000 ML: 0.9 INJECTION, SOLUTION INTRAVENOUS at 09:09

## 2017-09-21 RX ADMIN — IOHEXOL 100 ML: 350 INJECTION, SOLUTION INTRAVENOUS at 05:09

## 2017-09-21 RX ADMIN — FENTANYL CITRATE 0.25 MCG: 50 INJECTION, SOLUTION INTRAMUSCULAR; INTRAVENOUS at 09:09

## 2017-09-21 RX ADMIN — CEFEPIME HYDROCHLORIDE 2 G: 2 INJECTION, SOLUTION INTRAVENOUS at 04:09

## 2017-09-21 RX ADMIN — ACETAMINOPHEN 650 MG: 325 TABLET ORAL at 04:09

## 2017-09-21 RX ADMIN — DEXTROSE 8 MG/HR: 50 INJECTION, SOLUTION INTRAVENOUS at 11:09

## 2017-09-21 RX ADMIN — SODIUM CHLORIDE 1497 ML: 0.9 INJECTION, SOLUTION INTRAVENOUS at 04:09

## 2017-09-21 RX ADMIN — VANCOMYCIN HYDROCHLORIDE 1000 MG: 1 INJECTION, POWDER, LYOPHILIZED, FOR SOLUTION INTRAVENOUS at 04:09

## 2017-09-21 RX ADMIN — PANTOPRAZOLE SODIUM 80 MG: 40 INJECTION, POWDER, FOR SOLUTION INTRAVENOUS at 11:09

## 2017-09-21 NOTE — ED NOTES
Bed: ED TR01  Expected date:   Expected time:   Means of arrival:   Comments:  Ronni Vepatrizia GI Bleed

## 2017-09-21 NOTE — ED NOTES
82 year old female presents to ed cc of coffee ground emesis ems reports patient is resident at [facility] and had two episodes of coffee ground emesis. Patient care from ems patient awake alert oriented to self and place. Side rails up patient placed on continuous cardiac monitoring. Patient has not had episode of emesis nurse will continue to monitor

## 2017-09-22 ENCOUNTER — ANESTHESIA EVENT (OUTPATIENT)
Dept: SURGERY | Facility: HOSPITAL | Age: 82
DRG: 871 | End: 2017-09-22
Payer: MEDICARE

## 2017-09-22 ENCOUNTER — SURGERY (OUTPATIENT)
Age: 82
End: 2017-09-22

## 2017-09-22 ENCOUNTER — ANESTHESIA (OUTPATIENT)
Dept: SURGERY | Facility: HOSPITAL | Age: 82
DRG: 871 | End: 2017-09-22
Payer: MEDICARE

## 2017-09-22 ENCOUNTER — TELEPHONE (OUTPATIENT)
Dept: GASTROENTEROLOGY | Facility: CLINIC | Age: 82
End: 2017-09-22

## 2017-09-22 PROBLEM — K92.2 UPPER GI BLEED: Status: ACTIVE | Noted: 2017-09-22

## 2017-09-22 PROBLEM — Z66 DNR (DO NOT RESUSCITATE): Chronic | Status: ACTIVE | Noted: 2017-09-22

## 2017-09-22 PROBLEM — A41.50 GRAM NEGATIVE SEPTIC SHOCK: Status: ACTIVE | Noted: 2017-09-21

## 2017-09-22 PROBLEM — R78.81 GRAM-NEGATIVE BACTEREMIA: Status: ACTIVE | Noted: 2017-09-22

## 2017-09-22 PROBLEM — Z79.01 CURRENT USE OF LONG TERM ANTICOAGULATION: Chronic | Status: ACTIVE | Noted: 2017-09-22

## 2017-09-22 LAB
ABO + RH BLD: NORMAL
ALBUMIN SERPL BCP-MCNC: 2.6 G/DL
ALP SERPL-CCNC: 329 U/L
ALT SERPL W/O P-5'-P-CCNC: 88 U/L
AST SERPL-CCNC: 90 U/L
BASOPHILS # BLD AUTO: 0.04 K/UL
BASOPHILS NFR BLD: 0.2 %
BILIRUB DIRECT SERPL-MCNC: 4.4 MG/DL
BILIRUB SERPL-MCNC: 5.6 MG/DL
BLD GP AB SCN CELLS X3 SERPL QL: NORMAL
BLD PROD TYP BPU: NORMAL
BLD PROD TYP BPU: NORMAL
BLOOD GROUP ANTIBODIES SERPL: NORMAL
BLOOD UNIT EXPIRATION DATE: NORMAL
BLOOD UNIT EXPIRATION DATE: NORMAL
BLOOD UNIT TYPE CODE: 6200
BLOOD UNIT TYPE CODE: 6200
BLOOD UNIT TYPE: NORMAL
BLOOD UNIT TYPE: NORMAL
CODING SYSTEM: NORMAL
CODING SYSTEM: NORMAL
CORTIS SERPL-MCNC: 46.5 UG/DL
DIFFERENTIAL METHOD: ABNORMAL
DISPENSE STATUS: NORMAL
DISPENSE STATUS: NORMAL
EOSINOPHIL # BLD AUTO: 0 K/UL
EOSINOPHIL NFR BLD: 0.1 %
ERYTHROCYTE [DISTWIDTH] IN BLOOD BY AUTOMATED COUNT: 15.9 %
ESTIMATED PA SYSTOLIC PRESSURE: 52.84
GLOBAL PERICARDIAL EFFUSION: ABNORMAL
HCT VFR BLD AUTO: 29.5 %
HCT VFR BLD AUTO: 30 %
HCT VFR BLD AUTO: 30.4 %
HCT VFR BLD AUTO: 31.4 %
HCT VFR BLD AUTO: 31.4 %
HGB BLD-MCNC: 10.1 G/DL
HGB BLD-MCNC: 10.1 G/DL
HGB BLD-MCNC: 9.3 G/DL
HGB BLD-MCNC: 9.3 G/DL
HGB BLD-MCNC: 9.4 G/DL
INR PPP: 2.1
LACTATE SERPL-SCNC: 0.8 MMOL/L
LIPASE SERPL-CCNC: 537 U/L
LYMPHOCYTES # BLD AUTO: 1.4 K/UL
LYMPHOCYTES NFR BLD: 5.4 %
MAGNESIUM SERPL-MCNC: 1.4 MG/DL
MCH RBC QN AUTO: 31.9 PG
MCHC RBC AUTO-ENTMCNC: 32.2 G/DL
MCV RBC AUTO: 99 FL
MITRAL VALVE REGURGITATION: ABNORMAL
MONOCYTES # BLD AUTO: 2.6 K/UL
MONOCYTES NFR BLD: 9.8 %
NEUTROPHILS # BLD AUTO: 22 K/UL
NEUTROPHILS NFR BLD: 84.5 %
NUM UNITS TRANS FFP: NORMAL
NUM UNITS TRANS FFP: NORMAL
PHOSPHATE SERPL-MCNC: 4.1 MG/DL
PLATELET # BLD AUTO: 262 K/UL
PMV BLD AUTO: 9.9 FL
PROT SERPL-MCNC: 5.3 G/DL
PROTHROMBIN TIME: 21.8 SEC
RBC # BLD AUTO: 3.17 M/UL
RETIRED EF AND QEF - SEE NOTES: 50 (ref 55–65)
TRICUSPID VALVE REGURGITATION: ABNORMAL
WBC # BLD AUTO: 26.18 K/UL

## 2017-09-22 PROCEDURE — 85025 COMPLETE CBC W/AUTO DIFF WBC: CPT

## 2017-09-22 PROCEDURE — 63600175 PHARM REV CODE 636 W HCPCS: Performed by: NURSE ANESTHETIST, CERTIFIED REGISTERED

## 2017-09-22 PROCEDURE — C9113 INJ PANTOPRAZOLE SODIUM, VIA: HCPCS | Performed by: NURSE PRACTITIONER

## 2017-09-22 PROCEDURE — A4216 STERILE WATER/SALINE, 10 ML: HCPCS | Performed by: NURSE PRACTITIONER

## 2017-09-22 PROCEDURE — BF10YZZ FLUOROSCOPY OF BILE DUCTS USING OTHER CONTRAST: ICD-10-PCS | Performed by: RADIOLOGY

## 2017-09-22 PROCEDURE — 63600175 PHARM REV CODE 636 W HCPCS: Performed by: HOSPITALIST

## 2017-09-22 PROCEDURE — 63600175 PHARM REV CODE 636 W HCPCS: Performed by: NURSE PRACTITIONER

## 2017-09-22 PROCEDURE — 87077 CULTURE AEROBIC IDENTIFY: CPT

## 2017-09-22 PROCEDURE — 84100 ASSAY OF PHOSPHORUS: CPT

## 2017-09-22 PROCEDURE — 63600175 PHARM REV CODE 636 W HCPCS: Performed by: ANESTHESIOLOGY

## 2017-09-22 PROCEDURE — 25000003 PHARM REV CODE 250: Performed by: NURSE PRACTITIONER

## 2017-09-22 PROCEDURE — 83690 ASSAY OF LIPASE: CPT

## 2017-09-22 PROCEDURE — 85610 PROTHROMBIN TIME: CPT

## 2017-09-22 PROCEDURE — 80076 HEPATIC FUNCTION PANEL: CPT

## 2017-09-22 PROCEDURE — 85014 HEMATOCRIT: CPT | Mod: 91

## 2017-09-22 PROCEDURE — 83735 ASSAY OF MAGNESIUM: CPT

## 2017-09-22 PROCEDURE — 25000003 PHARM REV CODE 250: Performed by: NURSE ANESTHETIST, CERTIFIED REGISTERED

## 2017-09-22 PROCEDURE — 87015 SPECIMEN INFECT AGNT CONCNTJ: CPT

## 2017-09-22 PROCEDURE — 25000242 PHARM REV CODE 250 ALT 637 W/ HCPCS: Performed by: NURSE PRACTITIONER

## 2017-09-22 PROCEDURE — 94761 N-INVAS EAR/PLS OXIMETRY MLT: CPT

## 2017-09-22 PROCEDURE — 99222 1ST HOSP IP/OBS MODERATE 55: CPT | Mod: ,,, | Performed by: INTERNAL MEDICINE

## 2017-09-22 PROCEDURE — 37000009 HC ANESTHESIA EA ADD 15 MINS

## 2017-09-22 PROCEDURE — 25000003 PHARM REV CODE 250: Performed by: HOSPITALIST

## 2017-09-22 PROCEDURE — 93306 TTE W/DOPPLER COMPLETE: CPT | Mod: 26,,, | Performed by: INTERNAL MEDICINE

## 2017-09-22 PROCEDURE — 87186 SC STD MICRODIL/AGAR DIL: CPT | Mod: 59

## 2017-09-22 PROCEDURE — 94640 AIRWAY INHALATION TREATMENT: CPT

## 2017-09-22 PROCEDURE — 87070 CULTURE OTHR SPECIMN AEROBIC: CPT

## 2017-09-22 PROCEDURE — 87075 CULTR BACTERIA EXCEPT BLOOD: CPT

## 2017-09-22 PROCEDURE — 87040 BLOOD CULTURE FOR BACTERIA: CPT

## 2017-09-22 PROCEDURE — 87102 FUNGUS ISOLATION CULTURE: CPT

## 2017-09-22 PROCEDURE — 87116 MYCOBACTERIA CULTURE: CPT

## 2017-09-22 PROCEDURE — 87205 SMEAR GRAM STAIN: CPT

## 2017-09-22 PROCEDURE — 85018 HEMOGLOBIN: CPT | Mod: 91

## 2017-09-22 PROCEDURE — 37000008 HC ANESTHESIA 1ST 15 MINUTES

## 2017-09-22 PROCEDURE — 25000003 PHARM REV CODE 250: Performed by: EMERGENCY MEDICINE

## 2017-09-22 PROCEDURE — 0F9530Z DRAINAGE OF RIGHT HEPATIC DUCT WITH DRAINAGE DEVICE, PERCUTANEOUS APPROACH: ICD-10-PCS | Performed by: RADIOLOGY

## 2017-09-22 PROCEDURE — 20000000 HC ICU ROOM

## 2017-09-22 PROCEDURE — 93306 TTE W/DOPPLER COMPLETE: CPT

## 2017-09-22 RX ORDER — OXYCODONE AND ACETAMINOPHEN 5; 325 MG/1; MG/1
1 TABLET ORAL
Status: DISCONTINUED | OUTPATIENT
Start: 2017-09-22 | End: 2017-09-23

## 2017-09-22 RX ORDER — FUROSEMIDE 10 MG/ML
40 INJECTION INTRAMUSCULAR; INTRAVENOUS ONCE
Status: COMPLETED | OUTPATIENT
Start: 2017-09-22 | End: 2017-09-22

## 2017-09-22 RX ORDER — CETIRIZINE HYDROCHLORIDE 5 MG/1
10 TABLET ORAL DAILY
Status: DISCONTINUED | OUTPATIENT
Start: 2017-09-22 | End: 2017-09-25 | Stop reason: HOSPADM

## 2017-09-22 RX ORDER — ONDANSETRON 2 MG/ML
8 INJECTION INTRAMUSCULAR; INTRAVENOUS EVERY 8 HOURS PRN
Status: DISCONTINUED | OUTPATIENT
Start: 2017-09-22 | End: 2017-09-25 | Stop reason: HOSPADM

## 2017-09-22 RX ORDER — IPRATROPIUM BROMIDE AND ALBUTEROL SULFATE 2.5; .5 MG/3ML; MG/3ML
3 SOLUTION RESPIRATORY (INHALATION) EVERY 4 HOURS PRN
Status: DISCONTINUED | OUTPATIENT
Start: 2017-09-22 | End: 2017-09-25 | Stop reason: HOSPADM

## 2017-09-22 RX ORDER — LANOLIN ALCOHOL/MO/W.PET/CERES
1000 CREAM (GRAM) TOPICAL DAILY
Status: DISCONTINUED | OUTPATIENT
Start: 2017-09-22 | End: 2017-09-25 | Stop reason: HOSPADM

## 2017-09-22 RX ORDER — HYDROCODONE BITARTRATE AND ACETAMINOPHEN 500; 5 MG/1; MG/1
TABLET ORAL
Status: DISCONTINUED | OUTPATIENT
Start: 2017-09-22 | End: 2017-09-25 | Stop reason: HOSPADM

## 2017-09-22 RX ORDER — SODIUM CHLORIDE 0.9 % (FLUSH) 0.9 %
3 SYRINGE (ML) INJECTION EVERY 8 HOURS
Status: DISCONTINUED | OUTPATIENT
Start: 2017-09-22 | End: 2017-09-25 | Stop reason: HOSPADM

## 2017-09-22 RX ORDER — CEFEPIME HYDROCHLORIDE 2 G/50ML
2 INJECTION, SOLUTION INTRAVENOUS
Status: DISCONTINUED | OUTPATIENT
Start: 2017-09-22 | End: 2017-09-24

## 2017-09-22 RX ORDER — PROPOFOL 10 MG/ML
VIAL (ML) INTRAVENOUS CONTINUOUS PRN
Status: DISCONTINUED | OUTPATIENT
Start: 2017-09-22 | End: 2017-09-22

## 2017-09-22 RX ORDER — PHENYLEPHRINE HYDROCHLORIDE 10 MG/ML
INJECTION INTRAVENOUS
Status: DISCONTINUED | OUTPATIENT
Start: 2017-09-22 | End: 2017-09-22

## 2017-09-22 RX ORDER — SODIUM CHLORIDE 0.9 % (FLUSH) 0.9 %
3 SYRINGE (ML) INJECTION
Status: DISCONTINUED | OUTPATIENT
Start: 2017-09-22 | End: 2017-09-23

## 2017-09-22 RX ORDER — MORPHINE SULFATE 2 MG/ML
2 INJECTION, SOLUTION INTRAMUSCULAR; INTRAVENOUS EVERY 6 HOURS PRN
Status: DISCONTINUED | OUTPATIENT
Start: 2017-09-22 | End: 2017-09-25 | Stop reason: HOSPADM

## 2017-09-22 RX ORDER — ONDANSETRON 2 MG/ML
4 INJECTION INTRAMUSCULAR; INTRAVENOUS DAILY PRN
Status: DISCONTINUED | OUTPATIENT
Start: 2017-09-22 | End: 2017-09-23

## 2017-09-22 RX ORDER — TRAMADOL HYDROCHLORIDE 50 MG/1
50 TABLET ORAL EVERY 8 HOURS PRN
Status: DISCONTINUED | OUTPATIENT
Start: 2017-09-22 | End: 2017-09-25 | Stop reason: HOSPADM

## 2017-09-22 RX ORDER — FENTANYL CITRATE 50 UG/ML
INJECTION, SOLUTION INTRAMUSCULAR; INTRAVENOUS
Status: DISCONTINUED | OUTPATIENT
Start: 2017-09-22 | End: 2017-09-22

## 2017-09-22 RX ORDER — URSODIOL 300 MG/1
300 CAPSULE ORAL 2 TIMES DAILY
Status: DISCONTINUED | OUTPATIENT
Start: 2017-09-22 | End: 2017-09-25 | Stop reason: HOSPADM

## 2017-09-22 RX ORDER — HYDROMORPHONE HYDROCHLORIDE 2 MG/ML
0.2 INJECTION, SOLUTION INTRAMUSCULAR; INTRAVENOUS; SUBCUTANEOUS EVERY 5 MIN PRN
Status: DISCONTINUED | OUTPATIENT
Start: 2017-09-22 | End: 2017-09-23

## 2017-09-22 RX ORDER — SODIUM CHLORIDE 9 MG/ML
INJECTION, SOLUTION INTRAVENOUS CONTINUOUS
Status: DISCONTINUED | OUTPATIENT
Start: 2017-09-22 | End: 2017-09-22

## 2017-09-22 RX ORDER — KETAMINE HYDROCHLORIDE 50 MG/ML
INJECTION, SOLUTION INTRAMUSCULAR; INTRAVENOUS
Status: DISCONTINUED | OUTPATIENT
Start: 2017-09-22 | End: 2017-09-22

## 2017-09-22 RX ORDER — FLUTICASONE PROPIONATE 50 MCG
1 SPRAY, SUSPENSION (ML) NASAL DAILY
Status: DISCONTINUED | OUTPATIENT
Start: 2017-09-22 | End: 2017-09-25 | Stop reason: HOSPADM

## 2017-09-22 RX ADMIN — FLUTICASONE PROPIONATE 1 SPRAY: 50 SPRAY, METERED NASAL at 09:09

## 2017-09-22 RX ADMIN — MORPHINE SULFATE 2 MG: 2 INJECTION, SOLUTION INTRAMUSCULAR; INTRAVENOUS at 10:09

## 2017-09-22 RX ADMIN — URSODIOL 300 MG: 300 CAPSULE ORAL at 08:09

## 2017-09-22 RX ADMIN — KETAMINE HYDROCHLORIDE 10 MG: 50 INJECTION, SOLUTION, CONCENTRATE INTRAMUSCULAR; INTRAVENOUS at 02:09

## 2017-09-22 RX ADMIN — TRAMADOL HYDROCHLORIDE 50 MG: 50 TABLET, COATED ORAL at 10:09

## 2017-09-22 RX ADMIN — IPRATROPIUM BROMIDE AND ALBUTEROL SULFATE 3 ML: .5; 3 SOLUTION RESPIRATORY (INHALATION) at 08:09

## 2017-09-22 RX ADMIN — SODIUM CHLORIDE, PRESERVATIVE FREE 3 ML: 5 INJECTION INTRAVENOUS at 10:09

## 2017-09-22 RX ADMIN — PHENYLEPHRINE HYDROCHLORIDE 100 MCG: 10 INJECTION INTRAVENOUS at 03:09

## 2017-09-22 RX ADMIN — DEXTROSE 8 MG/HR: 50 INJECTION, SOLUTION INTRAVENOUS at 04:09

## 2017-09-22 RX ADMIN — DEXTROSE 8 MG/HR: 50 INJECTION, SOLUTION INTRAVENOUS at 09:09

## 2017-09-22 RX ADMIN — VANCOMYCIN HYDROCHLORIDE 750 MG: 100 INJECTION, POWDER, LYOPHILIZED, FOR SOLUTION INTRAVENOUS at 05:09

## 2017-09-22 RX ADMIN — CYANOCOBALAMIN TAB 1000 MCG 1000 MCG: 1000 TAB at 09:09

## 2017-09-22 RX ADMIN — KETAMINE HYDROCHLORIDE 10 MG: 50 INJECTION, SOLUTION, CONCENTRATE INTRAMUSCULAR; INTRAVENOUS at 03:09

## 2017-09-22 RX ADMIN — SODIUM CHLORIDE: 0.9 INJECTION, SOLUTION INTRAVENOUS at 02:09

## 2017-09-22 RX ADMIN — FENTANYL CITRATE 25 MCG: 50 INJECTION, SOLUTION INTRAMUSCULAR; INTRAVENOUS at 02:09

## 2017-09-22 RX ADMIN — DEXTROSE 8 MG/HR: 50 INJECTION, SOLUTION INTRAVENOUS at 03:09

## 2017-09-22 RX ADMIN — HYDROMORPHONE HYDROCHLORIDE 0.2 MG: 2 INJECTION, SOLUTION INTRAMUSCULAR; INTRAVENOUS; SUBCUTANEOUS at 08:09

## 2017-09-22 RX ADMIN — FENTANYL CITRATE 25 MCG: 50 INJECTION, SOLUTION INTRAMUSCULAR; INTRAVENOUS at 03:09

## 2017-09-22 RX ADMIN — PHENYLEPHRINE HYDROCHLORIDE 100 MCG: 10 INJECTION INTRAVENOUS at 02:09

## 2017-09-22 RX ADMIN — SODIUM CHLORIDE: 0.9 INJECTION, SOLUTION INTRAVENOUS at 03:09

## 2017-09-22 RX ADMIN — MORPHINE SULFATE 2 MG: 2 INJECTION, SOLUTION INTRAMUSCULAR; INTRAVENOUS at 05:09

## 2017-09-22 RX ADMIN — Medication 0.51 MCG/KG/MIN: at 07:09

## 2017-09-22 RX ADMIN — CEFEPIME HYDROCHLORIDE 2 G: 2 INJECTION, SOLUTION INTRAVENOUS at 04:09

## 2017-09-22 RX ADMIN — PROPOFOL 125 MCG/KG/MIN: 10 INJECTION, EMULSION INTRAVENOUS at 02:09

## 2017-09-22 RX ADMIN — CETIRIZINE HYDROCHLORIDE 10 MG: 10 TABLET, FILM COATED ORAL at 09:09

## 2017-09-22 RX ADMIN — SODIUM CHLORIDE, PRESERVATIVE FREE 3 ML: 5 INJECTION INTRAVENOUS at 02:09

## 2017-09-22 RX ADMIN — URSODIOL 300 MG: 300 CAPSULE ORAL at 09:09

## 2017-09-22 RX ADMIN — MORPHINE SULFATE 2 MG: 2 INJECTION, SOLUTION INTRAMUSCULAR; INTRAVENOUS at 04:09

## 2017-09-22 RX ADMIN — FUROSEMIDE 40 MG: 10 INJECTION, SOLUTION INTRAMUSCULAR; INTRAVENOUS at 08:09

## 2017-09-22 RX ADMIN — DEXTROSE 8 MG/HR: 50 INJECTION, SOLUTION INTRAVENOUS at 10:09

## 2017-09-22 RX ADMIN — ONDANSETRON 8 MG: 2 INJECTION INTRAMUSCULAR; INTRAVENOUS at 04:09

## 2017-09-22 RX ADMIN — SODIUM CHLORIDE: 0.9 INJECTION, SOLUTION INTRAVENOUS at 04:09

## 2017-09-22 RX ADMIN — SODIUM CHLORIDE, PRESERVATIVE FREE 3 ML: 5 INJECTION INTRAVENOUS at 05:09

## 2017-09-22 NOTE — ASSESSMENT & PLAN NOTE
Resides at [facility] ([address], [place] [address])

## 2017-09-22 NOTE — ASSESSMENT & PLAN NOTE
Coffee ground emesis  In the setting of supratherapeutic INR.  Holding warfarin and on pantoprazole.

## 2017-09-22 NOTE — NURSING
Spoke with Dr. Tiffayn MD.  Informed MD of need for blood consent and that FFP unable to be administered during procedure. MD stated it will be ok to administer FFP once patient back in ICU.

## 2017-09-22 NOTE — PLAN OF CARE
Pt requests that her sister, Lupe Vidal,  not be called unless it is an emergency, she does not want to worry her.    Pt's OVI, [employer] [facility] [facility], [facility] in [place], [phone]    Pt states that she can get around the home with her wheelchair, and is usually independent with her ADLs, only using a WC. Uses agency transportation for appointments.    Patient presents with    GI Bleeding       via EMS from [facility] with reports of a few episodes of coffee ground emesis today after lunch with fever 101.9. Room air O2 90%. AAO             09/22/17 1110   Discharge Assessment   Assessment Type Discharge Planning Assessment   Confirmed/corrected address and phone number on facesheet? Yes   Assessment information obtained from? Patient   Expected Length of Stay (days) 3   Communicated expected length of stay with patient/caregiver yes   Prior to hospitilization cognitive status: Alert/Oriented   Prior to hospitalization functional status: Assistive Equipment;Independent   Current cognitive status: Alert/Oriented   Current Functional Status: Needs Assistance;Assistive Equipment   [facility], [phone]   Lives With facility resident   Able to Return to Prior Arrangements yes   Is patient able to care for self after discharge? Unable to determine at this time (comments)   Who are your caregiver(s) and their phone number(s)? [facility] [phone]   Patient's perception of discharge disposition nursing home   Readmission Within The Last 30 Days no previous admission in last 30 days   Patient currently being followed by outpatient case management? No   Patient currently receives any other outside agency services? No   Equipment Currently Used at Home wheelchair   Do you have any problems affording any of your prescribed medications? No   Is the patient taking medications as prescribed? yes   Does the patient have  transportation home? Yes   Transportation Available agency transportation   Dialysis Name and Scheduled days N/A   Does the patient receive services at the [facility]madin Clinic? No   Discharge Plan A Return to nursing home   Discharge Plan B Return to Nursing Home   Patient/Family In Agreement With Plan yes     Ivana Tamayo RN Transitional Navigator  [phone]

## 2017-09-22 NOTE — PLAN OF CARE
Problem: Patient Care Overview  Goal: Plan of Care Review  Outcome: Ongoing (interventions implemented as appropriate)  Pt AAO, VSS, levophed d/c'd, no signs of distress noted, biliary drain placed and draining, denies any concerns, continue to monitor pain, H/H and lab values. Pain controlled with IV morphine.

## 2017-09-22 NOTE — PLAN OF CARE
Problem: Patient Care Overview  Goal: Plan of Care Review  SpO2   100% on   2lpm NC. No apparent distress noted. Will continue to monitor.

## 2017-09-22 NOTE — TELEPHONE ENCOUNTER
----- Message from Marina Falcon sent at 9/22/2017 11:17 AM CDT -----  Contact: Annalisa([facility])/ [phone]  CONSULTS  Patient:  Facility:ICU  Room /bed number:260  Referring MD:DR. Noel  Diagnosis:hematemesis pancreatitis possible cholangitis   Person calling & phone number:Annalisa [phone]

## 2017-09-22 NOTE — ASSESSMENT & PLAN NOTE
Current use of long term anticoagulation  Supratherapeutic INR  Takes warfarin 5 mg daily.  Holding for ERCP.

## 2017-09-22 NOTE — H&P
[facility] Hospital Medicine  History & Physical    Patient Name: Jazmín Bishop  MRN: [id number]  Admission Date: 9/21/2017  Attending Physician: Frank Noel MD   Primary Care Provider: [facility]         Patient information was obtained from patient, nursing home, past medical records and ER records.     Subjective:     Principal Problem:Septic shock    Chief Complaint:   Chief Complaint   Patient presents with    GI Bleeding     via EMS from [facility] with reports of a few episodes of coffee ground emesis today after lunch with fever 101.9. Room air O2 90%. AAO        HPI:      Jazmín Bishop is a 81 y/o  female with chronic a-fib, hypertension, GERD, chronic systolic CHF, vitamin B12 and iron deficiency anemia, osteoarthritis, and history of pulmonary embolism.  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  She never  and does not have any kids.  She is a resident of the [facility].  Her PCP is Dr. Luis Sparrow.         She presented to ED on 9/21/17 with complaints of constant, achy, non-radiating generalized abdominal pain x 1 week.  Accompanied by fever, nausea, and vomiting.  Patient had coffee ground/brown emesis noted on 9/21/17.  She had recently been diagnosed with moderate ileus on 9/13/17; treated with enema and dulcolax.  She denies shortness of breath, chest pain, palpitations, melena.       Upon arrival to ED patient with temperature as high as 103.6F, HR 110s-150s, , Hgb/Hct stable at 10.4/32.6, stool was positive for occult blood, lipase >1000, WBC count 10.54, elevated LFTs and tbili.  Abdominal CT showed nonspecific colitis and worsening intrahepatic and extrahepatic biliary dilatation.  She was given vancomycin, cefepime, tylenol, 2.5L NS, morphine, and fentanyl in ED.  She was admitted to Hospital Medicine's service with biliary sepsis.    Past Medical History:    Diagnosis Date    Arthritis     Cataract     CHF (congestive heart failure)     Chicken pox     Choledocholithiasis 05/16/2016    Hypertension     Ulcer        Past Surgical History:   Procedure Laterality Date    CHOLECYSTECTOMY      ERCP W/ SPHICTEROTOMY  05/16/2016    EYE SURGERY      HYSTERECTOMY      Pylorplasty      SMALL INTESTINE SURGERY      SPINE SURGERY      x2    STOMACH SURGERY         Review of patient's allergies indicates:   Allergen Reactions    Codeine sulfate Other (See Comments)     CONFUSION         No current facility-administered medications on file prior to encounter.      Current Outpatient Prescriptions on File Prior to Encounter   Medication Sig    aspirin (ECOTRIN) 81 MG EC tablet Take 81 mg by mouth once daily.    carvedilol (COREG) 25 MG tablet Take 1 tablet (25 mg total) by mouth 2 (two) times daily with meals.    furosemide (LASIX) 40 MG tablet Take 1 tablet (40 mg total) by mouth once daily.    hydrOXYzine pamoate (VISTARIL) 25 MG Cap Take 1 capsule (25 mg total) by mouth nightly as needed.    lisinopril (PRINIVIL,ZESTRIL) 20 MG tablet Take 2 tablets (40 mg total) by mouth nightly.    methocarbamol (ROBAXIN) 500 MG Tab Take 1 tablet (500 mg total) by mouth 2 (two) times daily as needed.    promethazine (PHENERGAN) 25 MG tablet Take 1 tablet (25 mg total) by mouth daily as needed for Nausea.    sucralfate (CARAFATE) 1 gram tablet Take 1 g by mouth 2 (two) times daily.    tramadol (ULTRAM) 50 mg tablet Take 50 mg by mouth every 8 (eight) hours as needed for Pain.    ursodiol (ACTIGALL) 300 mg capsule Take 1 capsule (300 mg total) by mouth 2 (two) times daily.    warfarin (COUMADIN) 3 MG tablet Take 1 tablet (3 mg total) by mouth Daily. (Patient taking differently: Take 5 mg by mouth Daily. )    cyanocobalamin (VITAMIN B-12) 1000 MCG tablet Take 1 tablet (1,000 mcg total) by mouth once daily.    ferrous sulfate 325 (65 FE) MG EC tablet Take 1 tablet (325  mg total) by mouth 3 (three) times daily with meals.     Family History     Problem Relation (Age of Onset)    Kidney disease Mother    No Known Problems Father        Social History Main Topics    Smoking status: Never Smoker    Smokeless tobacco: Never Used    Alcohol use No    Drug use: No    Sexual activity: No     Review of Systems   Constitutional: Positive for fever. Negative for chills and diaphoresis.   HENT: Negative for congestion, trouble swallowing and voice change.    Eyes: Negative for photophobia, pain and visual disturbance.   Respiratory: Negative for cough, shortness of breath and wheezing.    Cardiovascular: Negative for chest pain, palpitations and leg swelling.   Gastrointestinal: Positive for abdominal pain, nausea and vomiting.        Coffee ground emesis   Endocrine: Negative for cold intolerance and heat intolerance.   Genitourinary: Negative for dysuria, frequency and urgency.   Musculoskeletal: Negative for arthralgias, gait problem and myalgias.   Skin: Negative for color change, pallor and rash.   Neurological: Negative for dizziness, weakness and headaches.   Hematological: Does not bruise/bleed easily.   Psychiatric/Behavioral: Negative for agitation, confusion and hallucinations.     Objective:     Vital Signs (Most Recent):  Temp: (!) 103.6 °F (39.8 °C) (09/21/17 1729)  Pulse: 83 (09/21/17 2326)  Resp: 20 (09/21/17 2155)  BP: (!) 103/50 (09/21/17 2326)  SpO2: 99 % (09/21/17 2155) Vital Signs (24h Range):  Temp:  [100.8 °F (38.2 °C)-103.6 °F (39.8 °C)] 103.6 °F (39.8 °C)  Pulse:  [] 83  Resp:  [18-32] 20  SpO2:  [78 %-100 %] 99 %  BP: ()/(29-91) 103/50     Weight: 49.9 kg (110 lb)  Body mass index is 20.78 kg/m².    Physical Exam   Constitutional: She is oriented to person, place, and time. She appears well-developed and well-nourished. She is cooperative. No distress. Nasal cannula in place.   1L NC   HENT:   Head: Normocephalic and atraumatic.   Right Ear:  Decreased hearing is noted.   Left Ear: Decreased hearing is noted.   Mouth/Throat: Oropharynx is clear and moist. Mucous membranes are dry. Abnormal dentition.   edentulous   Eyes: EOM are normal. Pupils are equal, round, and reactive to light. No scleral icterus.   Neck: Normal range of motion. Neck supple. No tracheal deviation present.       Cardiovascular: Intact distal pulses.  An irregularly irregular rhythm present. Tachycardia present.    Pulmonary/Chest: Effort normal and breath sounds normal. No respiratory distress. She has no wheezes.   Abdominal: Soft. Bowel sounds are normal. She exhibits distension. There is generalized tenderness. There is no rigidity and no guarding.   Musculoskeletal: Normal range of motion. She exhibits no edema or tenderness.   Neurological: She is alert and oriented to person, place, and time. She has normal strength. GCS eye subscore is 4. GCS verbal subscore is 5. GCS motor subscore is 6.   Skin: Skin is warm, dry and intact. Bruising noted. No cyanosis. Nails show no clubbing.        Psychiatric: She has a normal mood and affect. Her speech is normal and behavior is normal. Thought content normal. Cognition and memory are normal.   Nursing note and vitals reviewed.       Significant Labs:   CBC:   Recent Labs  Lab 09/21/17  1608   WBC 10.54   HGB 10.4*   HCT 32.6*        CMP:   Recent Labs  Lab 09/21/17  1608      K 3.8      CO2 26   *   BUN 17   CREATININE 0.7   CALCIUM 8.6*   PROT 6.1   ALBUMIN 3.0*   BILITOT 2.7*   ALKPHOS 403*   *   *   ANIONGAP 11   EGFRNONAA >60     Cardiac Markers:   Recent Labs  Lab 09/21/17  1608   *  533*     Coagulation:   Recent Labs  Lab 09/21/17  1608   INR 3.5*   APTT 34.9*     Lactic Acid:   Recent Labs  Lab 09/21/17  1608 09/21/17  2049 09/21/17  2338   LACTATE 0.7 0.9 0.8     Lipase:   Recent Labs  Lab 09/21/17  1608   LIPASE >1000*     POCT Glucose: No results for input(s): POCTGLUCOSE in the  last 48 hours.  Troponin:   Recent Labs  Lab 09/21/17  1608   TROPONINI 0.018     TSH:   Recent Labs  Lab 09/21/17  1608   TSH 1.143     Urine Studies:   Recent Labs  Lab 09/21/17  1533   COLORU Yellow   APPEARANCEUA Clear   PHUR 6.0   SPECGRAV <=1.005*   PROTEINUA Negative   GLUCUA Negative   KETONESU Negative   BILIRUBINUA Negative   OCCULTUA Trace*   NITRITE Negative   UROBILINOGEN 1.0   LEUKOCYTESUR 1+*   RBCUA 3   WBCUA 2     Occult Blood Negative  Positive       Procalcitonin <0.25 ng/mL 0.59         All pertinent labs within the past 24 hours have been reviewed.    Significant Imaging: I have reviewed all pertinent imaging results/findings within the past 24 hours.     CTA Abdomen:   1.  No evidence of active extravasation of contrast into the bowel lumen.  No evidence of vascular occlusion.  Extensive atherosclerotic calcifications at the origins of the mesenteric vessels.  Some component of this may represent chronic mesenteric ischemia.  Suggest clinical correlation.    2.  Nonspecific wall thickening of the hepatic flexure, splenic flexure, and the distal descending colon.  No evidence of pneumatosis.  The findings may represent a nonspecific colitis.    3.  Status post cholecystectomy with worsening intrahepatic and extra hepatic biliary dilatation.  Outpatient MRCP may be obtained for further evaluation.    4.  Additional findings as above.    X-Ray Chest AP Portable:  Accentuation of the lung markings compared to prior study consider increased pulmonary venous pressure in this patient with cardiomegaly or underlying inflammatory or infectious process    Assessment/Plan:     * Septic shock    Acute biliary pancreatitis  Biliary Sepsis  Transaminitis  Hyperbilirubinemia  Patient meets criteria for septic shock due to temperature of 103.6F, HR 110s-140s, WBC count 10.54K, hypotension as low as 60s/30s requiring norepinephrine infusion, and biliary pancreatitis vs cholangitis as source of infection.  Given  2.5L NS, vancomycin, and cefepime.  Continue vancomycin and cefepime.  Hydrate with IVFs.  Avoid hepatotoxins.  Daily lipase and LFTs.  Consult GI for evaluation; appreciate assistance.  PRN pain and nausea medications.          Upper GI bleed    Coffee ground emesis  Possibly related to supra-therapeutic INR.  Hgb & Hct stable at 10.4/32.6 (was 10.8/33 on 2/9/17).  Protonix 80 mg IV followed by continuous protonix infusion.  Holding coumadin.  Daily INR.  Hgb/Hct q6H.  Type and match.  Transfuse as needed.  Consult GI for evaluation; appreciate assistance.          DNR (do not resuscitate)    Patient is DNR at nursing home and confirms that she would like to remain DNR while hospitalized.          History of pulmonary embolism    Takes coumadin 5 mg daily at NH.  INR supra-therapeutic at 3.5.  Holding coumadin due to coffee ground emesis and positive occult blood. Daily INR.          Iron deficiency anemia secondary to inadequate dietary iron intake    Chronic, stable.  Daily CBC.   Holding iron supplement while NPO.  Continue to monitor.          Persistent atrial fibrillation    Current use of long term anticoagulation  Supra-therapeutic INR  Takes coumadin 5 mg daily at NH.  INR supra-therapeutic at 3.5.  Holding coumadin due to coffee ground emesis and positive occult blood.  Monitor on telemetry.  Daily INR.          Chronic diastolic heart failure    Biatrial enlargement  ; CXR with possible increased pulmonary venous pressure.  Obtain 2D echo.  Holding carvedilol, lasix, and lisinopril due to hypotension.  Continue to monitor.          OA (osteoarthritis)    PRN pain medication.          Essential hypertension    Holding lisinopril and carvedilol due to hypotension.  Continue to monitor.            VTE Risk Mitigation         Ordered     High Risk of VTE  Once      09/22/17 0039     Place RONNI hose  Until discontinued      09/22/17 0039     Place sequential compression device  Until discontinued       09/22/17 0039     Reason for No Pharmacological VTE Prophylaxis  Once      09/22/17 0039         Critical Care Time: 45 Minutes minutes of critical care time was spent in the care of the patient. This included management of organ failures related to critical illness, titration of continuous infusions, review of pertinent labs and imaging studies, discussion of the patient with consulting services, and discussion of the patients condition with the patient and family.         Jean Pierre Pleitez NP  Department of Hospital Medicine   [facility]

## 2017-09-22 NOTE — ASSESSMENT & PLAN NOTE
Acute biliary pancreatitis  Biliary Sepsis  Transaminitis  Hyperbilirubinemia  Patient meets criteria for septic shock due to temperature of 103.6F, HR 110s-140s, WBC count 10.54K, hypotension as low as 60s/30s requiring norepinephrine infusion, and biliary pancreatitis vs cholangitis as source of infection.  Given 2.5L NS, vancomycin, and cefepime.  Continue vancomycin and cefepime.  Hydrate with IVFs.  Avoid hepatotoxins.  Daily lipase and LFTs.  Consult GI for evaluation; appreciate assistance.  PRN pain and nausea medications.

## 2017-09-22 NOTE — PLAN OF CARE
Pt received to ICU room 260 from ED at 0318. Continue to titrate down on Levophed for MAP greater than 65, current BP is 140/80's, sats 100% on 3L Nc, HR a fib 80's-110's. AAOx4, complains of back pain and being cold. Denies SOB, chest pain, abdomen pain. Safety precautions in place.

## 2017-09-22 NOTE — ASSESSMENT & PLAN NOTE
Current use of long term anticoagulation  Supra-therapeutic INR  Takes coumadin 5 mg daily at NH.  INR supra-therapeutic at 3.5.  Holding coumadin due to coffee ground emesis and positive occult blood.  Monitor on telemetry.  Daily INR.

## 2017-09-22 NOTE — SUBJECTIVE & OBJECTIVE
Past Medical History:   Diagnosis Date    Arthritis     Cataract     CHF (congestive heart failure)     Chicken pox     Choledocholithiasis 05/16/2016    Hypertension     Ulcer        Past Surgical History:   Procedure Laterality Date    CHOLECYSTECTOMY      ERCP W/ SPHICTEROTOMY  05/16/2016    EYE SURGERY      HYSTERECTOMY      Pylorplasty      SMALL INTESTINE SURGERY      SPINE SURGERY      x2    STOMACH SURGERY         Review of patient's allergies indicates:   Allergen Reactions    Codeine sulfate Other (See Comments)     CONFUSION       Family History     Problem Relation (Age of Onset)    Kidney disease Mother    No Known Problems Father        Social History Main Topics    Smoking status: Never Smoker    Smokeless tobacco: Never Used    Alcohol use No    Drug use: No    Sexual activity: No     Review of Systems   Constitutional: Positive for appetite change and fever. Negative for chills.   HENT: Negative for postnasal drip and trouble swallowing.    Eyes: Negative for pain and redness.   Respiratory: Negative for cough, choking, chest tightness and shortness of breath.    Cardiovascular: Negative for chest pain and leg swelling.   Gastrointestinal: Positive for abdominal pain and nausea. Negative for abdominal distention, anal bleeding, blood in stool, constipation, diarrhea, rectal pain and vomiting.   Endocrine: Negative for cold intolerance and heat intolerance.   Genitourinary: Negative for difficulty urinating and hematuria.   Musculoskeletal: Positive for arthralgias. Negative for back pain.   Skin: Negative for color change and pallor.   Allergic/Immunologic: Negative for environmental allergies and food allergies.   Neurological: Negative for dizziness and light-headedness.   Hematological: Negative for adenopathy. Does not bruise/bleed easily.   Psychiatric/Behavioral: Negative for agitation and behavioral problems.     Objective:     Vital Signs (Most Recent):  Temp: 98.9 °F  (37.2 °C) (09/22/17 1105)  Pulse: (!) 54 (09/22/17 1230)  Resp: (!) 36 (09/22/17 1230)  BP: 127/66 (09/22/17 1230)  SpO2: 100 % (09/22/17 1230) Vital Signs (24h Range):  Temp:  [97.5 °F (36.4 °C)-103.6 °F (39.8 °C)] 98.9 °F (37.2 °C)  Pulse:  [] 54  Resp:  [18-47] 36  SpO2:  [78 %-100 %] 100 %  BP: ()/(29-91) 127/66     Weight: 52.1 kg (114 lb 13.8 oz) (09/22/17 0330)  Body mass index is 24.01 kg/m².      Intake/Output Summary (Last 24 hours) at 09/22/17 1515  Last data filed at 09/22/17 1215   Gross per 24 hour   Intake          1578.39 ml   Output              455 ml   Net          1123.39 ml       Lines/Drains/Airways     Central Venous Catheter Line                 Percutaneous Central Line Insertion/Assessment - single lumen  09/21/17 1647 right internal jugular less than 1 day          Drain                 Urethral Catheter 09/21/17 2335 Latex 14 Fr. less than 1 day                Physical Exam   Constitutional: She is oriented to person, place, and time. She appears well-developed and well-nourished. No distress.   HENT:   Head: Normocephalic and atraumatic.   Eyes: Conjunctivae are normal. Scleral icterus is present.   Neck: Normal range of motion. Neck supple. No tracheal deviation present. No thyromegaly present.   Cardiovascular: Normal rate and regular rhythm.  Exam reveals no gallop and no friction rub.    No murmur heard.  Pulmonary/Chest: Effort normal and breath sounds normal. No respiratory distress. She has no wheezes.   Abdominal: Soft. Bowel sounds are normal. She exhibits no distension. There is tenderness.   Musculoskeletal:        Right wrist: She exhibits normal range of motion and no tenderness.        Left wrist: She exhibits normal range of motion and no tenderness.   Lymphadenopathy:        Head (right side): No submental and no submandibular adenopathy present.        Head (left side): No submental and no submandibular adenopathy present.   Neurological: She is alert and  oriented to person, place, and time.   Skin: Skin is warm and dry. No rash noted. She is not diaphoretic. No erythema.   Psychiatric: She has a normal mood and affect. Her behavior is normal.   Nursing note and vitals reviewed.      Significant Labs:  CBC:   Recent Labs  Lab 09/21/17  1608 09/22/17  0500 09/22/17  1114   WBC 10.54 26.18*  --    HGB 10.4* 10.1*  10.1* 9.3*   HCT 32.6* 31.4*  31.4* 30.4*    262  --      CMP:   Recent Labs  Lab 09/21/17  1608 09/22/17  0500   *  --    CALCIUM 8.6*  --    ALBUMIN 3.0* 2.6*   PROT 6.1 5.3*     --    K 3.8  --    CO2 26  --      --    BUN 17  --    CREATININE 0.7  --    ALKPHOS 403* 329*   * 88*   * 90*   BILITOT 2.7* 5.6*     Coagulation:   Recent Labs  Lab 09/21/17  1608 09/22/17  0500   INR 3.5* 2.1*   APTT 34.9*  --        Significant Imaging:  CT: I have reviewed all results within the past 24 hours and my personal findings are:  johnny dilation

## 2017-09-22 NOTE — PROCEDURES
Radiology Post-Procedure Note    Pre Op Diagnosis: Cholangitis    Post Op Diagnosis: Same    Procedure: Percutaneous transhepatic cholangiography    Procedure performed by: Felton Askew MD    Written Informed Consent Obtained: Yes    Specimen Removed: YES 5 cc clear green bile    Estimated Blood Loss: Minimal    Findings:    Local anesthesia and moderate sedation were used.    Under US guidance, a peripheral duct in the right hepatic lobe was accessed using a 22 gauge Chiba needle and contrast was injected confirming position in the biliary tree. A microwire was advanced into the common bile duct and the system was upsized using an Accustick introducer set. A J-wire was then advanced into the small bowel and the tract was serially dilated. An 8.0 internal/external biliary drainage catheter was advanced over the wire and the pigtail was formed in the small bowel. Cholangiogram was performed demonstrating appropriate position of the pigtail and the catheter side holes. The catheter was secured to the skin using 2-0 silk sutures. The catheter was left to gravity drainage and dressed with a sterile dressing.    There were no complications.  Please see Imaging report for further details.      Felton Askew M.D.  Diagnostic and Interventional Radiologist  Department of Radiology  Pager: [phone]

## 2017-09-22 NOTE — ASSESSMENT & PLAN NOTE
Coffee ground emesis  Possibly related to supra-therapeutic INR.  Hgb & Hct stable at 10.4/32.6 (was 10.8/33 on 2/9/17).  Protonix 80 mg IV followed by continuous protonix infusion.  Holding coumadin.  Daily INR.  Hgb/Hct q6H.  Type and match.  Transfuse as needed.  Consult GI for evaluation; appreciate assistance.

## 2017-09-22 NOTE — HPI
This is an 82-year-old female with a past medical history of choledocholithiasis, atrial fibrillation on chronic anticoagulation, hypertension who presents to the hospital with abdominal pain.  She noted one week of epigastric discomfort associated with some subjective fevers.  The pain is sharp, nonradiating and mild to moderate intensity.  Some nausea but only a few episodes of vomiting followed by some coffee-ground emesis.  She denies any additional NSAID usage.  No changes in bowel habits.  No diarrhea, melena.  No other exacerbating or relieving factors or other associated symptoms.  She is noted to be febrile.

## 2017-09-22 NOTE — PROGRESS NOTES
"Vancomycin Dosing and Monitoring Pharmacy Protocol    Jazmín Bishop is a 82 y.o. female    Height: 5' 1" (1.549 m)   Wt Readings from Last 1 Encounters:   09/21/17 49.9 kg (110 lb)     Ideal body weight: 47.8 kg (105 lb 6.1 oz)  Adjusted ideal body weight: 48.6 kg (107 lb 3.7 oz)    Temp Readings from Last 3 Encounters:   09/21/17 (!) 103.6 °F (39.8 °C) (Rectal)   02/09/17 97.7 °F (36.5 °C) (Oral)   05/20/16 97.6 °F (36.4 °C) (Oral)      Lab Results   Component Value Date/Time    WBC 10.54 09/21/2017 04:08 PM    WBC 2.49 (L) 02/09/2017 03:58 AM    WBC 2.95 (L) 02/08/2017 04:11 AM      Lab Results   Component Value Date/Time    CREATININE 0.7 09/21/2017 04:08 PM    CREATININE 0.6 02/09/2017 03:58 AM    CREATININE 0.6 02/08/2017 04:11 AM        Serum creatinine: 0.7 mg/dL 09/21/17 1608  Estimated creatinine clearance: 48.4 mL/min    Antibiotics       Start     Stop Route Frequency Ordered    09/22/17 1630  vancomycin 750 mg in dextrose 5 % 250 mL IVPB (ready to mix system)      -- IV Every 24 hours (non-standard times) 09/22/17 0053    09/22/17 0430  ceFEPIme in dextrose 5% 2 gram/50 mL IVPB 2 g  (Beta-Lactam WITHOUT resistance or severe beta-lactam allergy)      09/29 0429 IV Every 12 hours (non-standard times) 09/22/17 0039          Antifungals       None            Microbiology Results (last 7 days)       Procedure Component Value Units Date/Time    Urine Culture [[id number]]     Order Status:  Canceled Specimen:  Urine     Blood culture x two cultures. Draw prior to antibiotics. [[id number]] Collected:  09/21/17 1621    Order Status:  Sent Specimen:  Blood from Peripheral, Antecubital, Left Updated:  09/21/17 1853    Blood culture x two cultures. Draw prior to antibiotics. [[id number]] Collected:  09/21/17 1626    Order Status:  Sent Specimen:  Blood from Peripheral, Antecubital, Right Updated:  09/21/17 8524    Urine culture [[id number]] Collected:  09/21/17 1533    Order Status:  Sent Specimen:  Urine from " Urine, Clean Catch Updated:  17 1533            Indication:   Bacteremia, sepsis     Target Level: 15-20 mcg/ml    Hemodialysis:   No on N/A    Dosing Weight:   ABW--actual body weight  If ABW is greater than or equal to 30% over Ideal Body Weight, AdjBW will be used to calculate vancomycin dose.    Last Vancomycin dose: 1000 mg   Date/Time given: 17 at 1625          Vancomycin level:  No results for input(s): VANCOMYCIN-TROUGH in the last 72 hours.  No results for input(s): VANCOMYCIN, RANDOM in the last 72 hours.    Per Protocol Initial/Adjustments Dosin. Initial/Adjustment Dose: DECREASE Vancomycin will be adjusted from 750mg q12hr to 750mg q24hr  2. Vancomycin Trough Level will be drawn on 17 at 1600 date/time    Pharmacy will continue to follow.    Please contact if you have any further questions. Thank you.    Omar Yun, PharmD  [phone]

## 2017-09-22 NOTE — SUBJECTIVE & OBJECTIVE
Interval History: Very pleasant, friendly, upbeat, and cognitively well intact.    Review of Systems   Respiratory: Negative for cough and shortness of breath.    Gastrointestinal: Positive for abdominal pain and nausea.     Objective:     Vital Signs (Most Recent):  Temp: 98.9 °F (37.2 °C) (09/22/17 1105)  Pulse: (!) 54 (09/22/17 1230)  Resp: (!) 36 (09/22/17 1230)  BP: 127/66 (09/22/17 1230)  SpO2: 100 % (09/22/17 1230) Vital Signs (24h Range):  Temp:  [97.5 °F (36.4 °C)-103.6 °F (39.8 °C)] 98.9 °F (37.2 °C)  Pulse:  [] 54  Resp:  [18-47] 36  SpO2:  [78 %-100 %] 100 %  BP: ()/(29-91) 127/66     Weight: 52.1 kg (114 lb 13.8 oz)  Body mass index is 24.01 kg/m².    Intake/Output Summary (Last 24 hours) at 09/22/17 1312  Last data filed at 09/22/17 1215   Gross per 24 hour   Intake          1578.39 ml   Output              455 ml   Net          1123.39 ml      Physical Exam   Constitutional: She is oriented to person, place, and time. She appears well-developed. No distress.   Cardiovascular: Normal rate and regular rhythm.    Pulmonary/Chest: Effort normal. No respiratory distress.   Abdominal: Soft. There is tenderness.   Neurological: She is alert and oriented to person, place, and time.   Psychiatric: She has a normal mood and affect.   Nursing note and vitals reviewed.      Significant Labs:   CBC:   Recent Labs  Lab 09/21/17  1608 09/22/17  0500   WBC 10.54 26.18*   HGB 10.4* 10.1*  10.1*   HCT 32.6* 31.4*  31.4*    262     CMP:   Recent Labs  Lab 09/21/17  1608 09/22/17  0500     --    K 3.8  --      --    CO2 26  --    *  --    BUN 17  --    CREATININE 0.7  --    CALCIUM 8.6*  --    PROT 6.1 5.3*   ALBUMIN 3.0* 2.6*   BILITOT 2.7* 5.6*   ALKPHOS 403* 329*   * 90*   * 88*   ANIONGAP 11  --    EGFRNONAA >60  --      All pertinent labs within the past 24 hours have been reviewed.    Significant Imaging: I have reviewed all pertinent imaging results/findings  within the past 24 hours.   X-Ray Chest AP Portable 9/21/17: There is a central line entering from the right, distal tip in the SVC.  The cardiac silhouette is enlarged.  There is increased interstitial lung markings seen throughout both lung fields, it is accentuated compared to prior studies.  It is nonspecific and may be due to increased pulmonary venous pressure are normal  Inflammatory or infectious process.  There is certainly no area of focal airspace disease.  No pleural effusion.  No pneumothorax.  There is atherosclerotic changes of the aorta.  Impression: Accentuation of the lung markings compared to prior study consider increased pulmonary venous pressure in this patient with cardiomegaly or underlying inflammatory or infectious process  CTA Abdomen 9/21/17:   Noncontrast CT scan of the abdomen examination:  There postoperative changes of prior cholecystectomy.  There are also postoperative changes in the gastroesophageal junction.  There are extensive atherosclerotic calcifications of the abdominal aorta and its branch vessels.  No evidence of mesenteric or portal venous air is identified.  CT angiogram examination:  There is no evidence of active extravasation of contrast into the bowel lumen. There is normal tapering of the abdominal aorta without evidence of aneurysm.  No intimal flap is noted to suggest a dissection.  Extensive plaques at the origins of the bilateral renal arteries.  Extensive plaque formation is identified at the origin of the celiac axis with post stenotic dilatation.  There also extensive calcifications of the origins of the SMA and ANTHONY.  There is no evidence of vascular occlusion.  CT scan abdomen and pelvis examination:  There are trace bilateral pleural effusions.  There is a 4 mm calcified granuloma in the right lung base.  There is increased attenuation of the pulmonary parenchyma.  The heart is enlarged.  There is no evidence of lymphadenopathy in the abdomen or  pelvis.  There are postoperative changes at EG junction.  Evaluation of the stomach is slightly limited by motion.  There is no evidence of gastric outlet obstruction.  The duodenum is unremarkable.  There is mild wall thickening of the small bowel loops.  No transition point is identified.  The appendix is not visualized.  There are no secondary findings of acute appendicitis.  There is nonspecific wall thickening involving the hepatic flexure.  Minimal wall thickening is also identified in the distal descending colon in the region of the splenic flexure.  No significant diverticular disease is identified.  The liver is enlarged.  The patient is status post cholecystectomy.  Previously described stones in the biliary system are not visualized.  There is worsening of the intrahepatic and extrahepatic biliary dilatation.  The spleen is enlarged.  There are splenules present.  There is a stable 10 mm hypodensity along the lateral aspect of the spleen.  The pancreas is within normal limits.  The adrenal glands are unremarkable.  The kidneys are normal in appearance.  The ureters and the urinary bladder are within normal limits.  The patient is status post hysterectomy.  There is no evidence of free fluid in the abdomen or pelvis.  There is no evidence of free air.  There is no evidence of pneumatosis.  The psoas margins are unremarkable.  The abdominal wall is within normal limits.  There are degenerative changes in the osseous structures.  There is no evidence of a fracture.  Impression:  1.  No evidence of active extravasation of contrast into the bowel lumen.  No evidence of vascular occlusion.  Extensive atherosclerotic calcifications at the origins of the mesenteric vessels.  Some component of this may represent chronic mesenteric ischemia.  Suggest clinical correlation.  2.  Nonspecific wall thickening of the hepatic flexure, splenic flexure, and the distal descending colon.  No evidence of pneumatosis.  The  findings may represent a nonspecific colitis.  3.  Status post cholecystectomy with worsening intrahepatic and extra hepatic biliary dilatation.  Outpatient MRCP may be obtained for further evaluation.  4.  Additional findings as above.

## 2017-09-22 NOTE — ASSESSMENT & PLAN NOTE
Biatrial enlargement  ; CXR with possible increased pulmonary venous pressure.  Obtain 2D echo.  Holding carvedilol, lasix, and lisinopril due to hypotension.  Continue to monitor.

## 2017-09-22 NOTE — ASSESSMENT & PLAN NOTE
Takes coumadin 5 mg daily at NH.  INR supra-therapeutic at 3.5.  Holding coumadin due to coffee ground emesis and positive occult blood. Daily INR.

## 2017-09-22 NOTE — PLAN OF CARE
Received pt into ICU room 260 from the ED on a stretcher with cardiac monitor, pt belongings and chart. VSS. Pt belongings placed in a pt bag. Wallet and money sent to locker with security.

## 2017-09-22 NOTE — ASSESSMENT & PLAN NOTE
Acute biliary pancreatitis  Biliary sepsis  Gram-negative bacteremia  Transaminitis  Hyperbilirubinemia  History of Billroth II operation  Treating with cefepime.  Wean norepinephrine as tolerated.  Continue ursodiol.  Awaiting GI for what will be a difficult ERCP.

## 2017-09-22 NOTE — CONSULTS
Inpatient Radiology Pre-procedure Note    History of Present Illness:  Jazmín Bishop is a 82 y.o. female who presents for PTC 2/2 cholangitis.  Admission H&P reviewed.  Past Medical History:   Diagnosis Date    Arthritis     Cataract     CHF (congestive heart failure)     Chicken pox     Choledocholithiasis 05/16/2016    Hypertension     Ulcer      Past Surgical History:   Procedure Laterality Date    CHOLECYSTECTOMY      ERCP W/ SPHICTEROTOMY  05/16/2016    EYE SURGERY      HYSTERECTOMY      Pylorplasty      SMALL INTESTINE SURGERY      SPINE SURGERY      x2    STOMACH SURGERY         Review of Systems:   As documented in primary team H&P    Home Meds:   Prior to Admission medications    Medication Sig Start Date End Date Taking? Authorizing Provider   acetaminophen (TYLENOL) 500 MG tablet Take 500 mg by mouth every 6 (six) hours as needed for Pain.   Yes Historical Provider, MD   albuterol-ipratropium 2.5mg-0.5mg/3mL (DUO-NEB) 0.5 mg-3 mg(2.5 mg base)/3 mL nebulizer solution Take 3 mLs by nebulization every 6 (six) hours as needed for Wheezing. Rescue   Yes Historical Provider, MD   aluminum & magnesium hydroxide-simethicone (MYLANTA MAX STRENGTH) 400-400-40 mg/5 mL suspension Take 20 mLs by mouth every 6 (six) hours as needed for Indigestion.   Yes Historical Provider, MD   ascorbic acid, vitamin C, (VITAMIN C) 500 MG tablet Take 500 mg by mouth 3 (three) times daily.   Yes Historical Provider, MD   aspirin (ECOTRIN) 81 MG EC tablet Take 81 mg by mouth once daily.   Yes Historical Provider, MD   carvedilol (COREG) 25 MG tablet Take 1 tablet (25 mg total) by mouth 2 (two) times daily with meals. 6/23/15  Yes Xander Portillo MD   docusate sodium (COLACE) 100 MG capsule Take 100 mg by mouth once daily.   Yes Historical Provider, MD   fluticasone (FLONASE) 50 mcg/actuation nasal spray 1 spray by Each Nare route once daily.   Yes Historical Provider, MD   furosemide (LASIX) 40 MG tablet Take 1  tablet (40 mg total) by mouth once daily. 4/11/16 9/21/17 Yes Mena Del Castillo NP   hydrOXYzine pamoate (VISTARIL) 25 MG Cap Take 1 capsule (25 mg total) by mouth nightly as needed. 4/11/16  Yes Mena Del Castillo NP   hyoscyamine (ANASPAZ,LEVSIN) 0.125 mg Tab Take 125 mcg by mouth every 6 (six) hours as needed (abdominal pain).    Yes Historical Provider, MD   lisinopril (PRINIVIL,ZESTRIL) 20 MG tablet Take 2 tablets (40 mg total) by mouth nightly. 6/23/15  Yes Xander Portillo MD   loperamide (IMODIUM) 2 mg capsule Take 2 mg by mouth every 2 (two) hours as needed for Diarrhea.   Yes Historical Provider, MD   loratadine (CLARITIN) 10 mg tablet Take 10 mg by mouth once daily.   Yes Historical Provider, MD   methocarbamol (ROBAXIN) 500 MG Tab Take 1 tablet (500 mg total) by mouth 2 (two) times daily as needed. 11/12/15  Yes Mena Del Castillo NP   ondansetron (ZOFRAN) 4 MG tablet Take 4 mg by mouth every 6 (six) hours as needed for Nausea.   Yes Historical Provider, MD   pantoprazole (PROTONIX) 40 MG tablet Take 40 mg by mouth once daily.   Yes Historical Provider, MD   potassium chloride SA (K-DUR,KLOR-CON) 10 MEQ tablet Take 10 mEq by mouth once daily.    Yes Historical Provider, MD   promethazine (PHENERGAN) 25 MG tablet Take 1 tablet (25 mg total) by mouth daily as needed for Nausea. 5/20/16  Yes Indra Clark MD   SIMETHICONE (MYLANTA GAS ORAL) Take by mouth.   Yes Historical Provider, MD   simethicone (MYLICON) 80 MG chewable tablet Take 80 mg by mouth every 6 (six) hours as needed for Flatulence.   Yes Historical Provider, MD   sucralfate (CARAFATE) 1 gram tablet Take 1 g by mouth 2 (two) times daily.   Yes Historical Provider, MD   tramadol (ULTRAM) 50 mg tablet Take 50 mg by mouth every 8 (eight) hours as needed for Pain.   Yes Historical Provider, MD   ursodiol (ACTIGALL) 300 mg capsule Take 1 capsule (300 mg total) by mouth 2 (two) times daily. 5/19/16 9/21/17 Yes Kanu Britton MD   warfarin (COUMADIN)  3 MG tablet Take 1 tablet (3 mg total) by mouth Daily.  Patient taking differently: Take 5 mg by mouth Daily.  9/28/15 9/21/17 Yes Hernna Bah MD   cyanocobalamin (VITAMIN B-12) 1000 MCG tablet Take 1 tablet (1,000 mcg total) by mouth once daily. 2/9/17   Jessy Choudhary MD   ferrous sulfate 325 (65 FE) MG EC tablet Take 1 tablet (325 mg total) by mouth 3 (three) times daily with meals. 2/9/17 2/9/18  Jessy Choudhary MD     Scheduled Meds:    ceFEPime (MAXIPIME) IVPB  2 g Intravenous Q12H    cetirizine  10 mg Oral Daily    cyanocobalamin  1,000 mcg Oral Daily    fluticasone  1 spray Each Nare Daily    sodium chloride 0.9%  3 mL Intravenous Q8H    ursodiol  300 mg Oral BID    vancomycin (VANCOCIN) IVPB  15 mg/kg Intravenous Q24H     Continuous Infusions:    sodium chloride 0.9% 75 mL/hr at 09/22/17 0306    norepinephrine bitartrate-D5W 0.49 mcg/kg/min (09/22/17 0849)    pantoprazole 40 mg in dextrose 5 % 100 mL infusion (ready to mix system) 8 mg/hr (09/22/17 1012)     PRN Meds:sodium chloride, morphine, ondansetron, promethazine (PHENERGAN) IVPB, tramadol  Anticoagulants/Antiplatelets: aspirin and Coumadin    Allergies:   Review of patient's allergies indicates:   Allergen Reactions    Codeine sulfate Other (See Comments)     CONFUSION       Sedation Hx: have not been any systemic reactions    Labs:    Recent Labs  Lab 09/22/17  0500   INR 2.1*       Recent Labs  Lab 09/22/17  0500 09/22/17  1114   WBC 26.18*  --    HGB 10.1*  10.1* 9.3*   HCT 31.4*  31.4* 30.4*   MCV 99*  --      --       Recent Labs  Lab 09/21/17  1608 09/22/17  0500   *  --      --    K 3.8  --      --    CO2 26  --    BUN 17  --    CREATININE 0.7  --    CALCIUM 8.6*  --    MG 1.6 1.4*   * 88*   * 90*   ALBUMIN 3.0* 2.6*   BILITOT 2.7* 5.6*   BILIDIR  --  4.4*         Vitals:  Temp: 98.9 °F (37.2 °C) (09/22/17 1105)  Pulse: (!) 54 (09/22/17 1230)  Resp: (!) 36 (09/22/17  1230)  BP: 127/66 (09/22/17 1230)  SpO2: 100 % (09/22/17 1230)     Physical Exam:  ASA: Per anesthesia  Mallampati: Per anesthesia    General: no acute distress  Mental Status: alert and oriented to person, place and time  HEENT: normocephalic, atraumatic  Chest: unlabored breathing  Heart: regular heart rate  Abdomen: nondistended  Extremity: moves all extremities    Plan: US and fluoro guided PTC. Pt at increased risk of bleeding due to anticoagulation. This was discussed with the pt. Given sepsis and cholangitis benefits outweighs risks.  Sedation Plan: Per anesthesia    Felton Askew M.D.  Diagnostic and Interventional Radiologist  Department of Radiology  Pager: [phone]

## 2017-09-22 NOTE — ED NOTES
Dr. Ordonez notified of pt's BP  Instructed to call admit team.  Paged [facility] admit team at this time.

## 2017-09-22 NOTE — ASSESSMENT & PLAN NOTE
Patient is DNR at nursing home and confirms that she would like to remain DNR while hospitalized.

## 2017-09-22 NOTE — ASSESSMENT & PLAN NOTE
Biatrial enlargement  ; CXR with possible increased pulmonary venous pressure.  Echocardiogram result pending.  Holding carvedilol, furosemide, and lisinopril due to hypotension.  Continue to monitor.

## 2017-09-22 NOTE — HPI
Jazmín Bishop is a 82 y.o. white woman with hypertension, chronic systolic congestive heart failure, chronic atrial fibrillation (anticoagulated on warfarin), gastroesophageal reflux disease, osteoarthritis, history of pulmonary embolism, choledocholithiasis that was unable to be treated endoscopically (instead treated with ursodiol), vitamin B12 and iron deficiency anemia.  She lives at [facility] in [place], [place].  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  She never  and does not have any children.  Her primary care physician is Dr. Luis Sparrow.   She had an ERCP with sphincterotomy done at [facility] on 5/16/16 by Dr. 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  Biliary stones could not be removed due to the sharp angulation of the bile duct, which inhibited withdrawal of a balloon tipped catheter.  She was put on ursodiol and the plan was to repeat ERCP only if she had increasing cholestasis or cholangitis.   She returned to [facility] ED on 9/21/17 with 1 week of constant aching generalized abdominal pain, worst in the left upper quadrant and right lower quadrant, accompanied by fever, nausea, and vomiting.  She had coffee ground emesis noted.  She had recently been diagnosed with moderate ileus on 9/13/17 and was treated with bisacodyl and an enema.  She was found to have colitis, biliary pancreatitis, cholestasis, and sepsis (temperature 103.6 F, total bilirubin 2.7, , , alkaline phosphatase 403, lipase >1000, abdominal CT showing colitis and worsening intrahepatic and extrahepatic biliary dilatation.  She was given cefepime, vancomycin, acetaminophen, morphine, fentanyl, and 2.5 liters of normal saline.  She was admitted to [facility] with a Gastroenterology consult.

## 2017-09-22 NOTE — HOSPITAL COURSE
While in the ED her blood pressure decreased to 60s/30s requiring placement of a right internal jugular triple lumen central venous catheter and initiation of norepinephrine infusion.  Cefepime and ursodiol were continued as she awaited Gastroenterology.  Blood culture grew Gram negative rods.  Gastroenterology consulted Interventional Radiology to perform percutaneous transhepatic cholangiography and drain placement.  There were no stones but there was resistance at the ampulla suggesting a biliary stricture.  Sepsis and liver enzymes rapidly improved afterward.  She required furosemide after getting too much fluids, and diuresed well.  Blood culture grew E coli and biliary drain culture grew Enterococcus.  She was put on amoxicillin-clavulanate for another 7 days and discharged back to the [facility] on 9/25/17 with the drain in place.  She will follow up with Gastroenterology.

## 2017-09-22 NOTE — SUBJECTIVE & OBJECTIVE
Past Medical History:   Diagnosis Date    Arthritis     Cataract     CHF (congestive heart failure)     Chicken pox     Choledocholithiasis 05/16/2016    Hypertension     Ulcer        Past Surgical History:   Procedure Laterality Date    CHOLECYSTECTOMY      ERCP W/ SPHICTEROTOMY  05/16/2016    EYE SURGERY      HYSTERECTOMY      Pylorplasty      SMALL INTESTINE SURGERY      SPINE SURGERY      x2    STOMACH SURGERY         Review of patient's allergies indicates:   Allergen Reactions    Codeine sulfate Other (See Comments)     CONFUSION         No current facility-administered medications on file prior to encounter.      Current Outpatient Prescriptions on File Prior to Encounter   Medication Sig    aspirin (ECOTRIN) 81 MG EC tablet Take 81 mg by mouth once daily.    carvedilol (COREG) 25 MG tablet Take 1 tablet (25 mg total) by mouth 2 (two) times daily with meals.    furosemide (LASIX) 40 MG tablet Take 1 tablet (40 mg total) by mouth once daily.    hydrOXYzine pamoate (VISTARIL) 25 MG Cap Take 1 capsule (25 mg total) by mouth nightly as needed.    lisinopril (PRINIVIL,ZESTRIL) 20 MG tablet Take 2 tablets (40 mg total) by mouth nightly.    methocarbamol (ROBAXIN) 500 MG Tab Take 1 tablet (500 mg total) by mouth 2 (two) times daily as needed.    promethazine (PHENERGAN) 25 MG tablet Take 1 tablet (25 mg total) by mouth daily as needed for Nausea.    sucralfate (CARAFATE) 1 gram tablet Take 1 g by mouth 2 (two) times daily.    tramadol (ULTRAM) 50 mg tablet Take 50 mg by mouth every 8 (eight) hours as needed for Pain.    ursodiol (ACTIGALL) 300 mg capsule Take 1 capsule (300 mg total) by mouth 2 (two) times daily.    warfarin (COUMADIN) 3 MG tablet Take 1 tablet (3 mg total) by mouth Daily. (Patient taking differently: Take 5 mg by mouth Daily. )    cyanocobalamin (VITAMIN B-12) 1000 MCG tablet Take 1 tablet (1,000 mcg total) by mouth once daily.    ferrous sulfate 325 (65 FE) MG EC  tablet Take 1 tablet (325 mg total) by mouth 3 (three) times daily with meals.     Family History     Problem Relation (Age of Onset)    Kidney disease Mother    No Known Problems Father        Social History Main Topics    Smoking status: Never Smoker    Smokeless tobacco: Never Used    Alcohol use No    Drug use: No    Sexual activity: No     Review of Systems   Constitutional: Positive for fever. Negative for chills and diaphoresis.   HENT: Negative for congestion, trouble swallowing and voice change.    Eyes: Negative for photophobia, pain and visual disturbance.   Respiratory: Negative for cough, shortness of breath and wheezing.    Cardiovascular: Negative for chest pain, palpitations and leg swelling.   Gastrointestinal: Positive for abdominal pain, nausea and vomiting.        Coffee ground emesis   Endocrine: Negative for cold intolerance and heat intolerance.   Genitourinary: Negative for dysuria, frequency and urgency.   Musculoskeletal: Negative for arthralgias, gait problem and myalgias.   Skin: Negative for color change, pallor and rash.   Neurological: Negative for dizziness, weakness and headaches.   Hematological: Does not bruise/bleed easily.   Psychiatric/Behavioral: Negative for agitation, confusion and hallucinations.     Objective:     Vital Signs (Most Recent):  Temp: (!) 103.6 °F (39.8 °C) (09/21/17 1729)  Pulse: 83 (09/21/17 2326)  Resp: 20 (09/21/17 2155)  BP: (!) 103/50 (09/21/17 2326)  SpO2: 99 % (09/21/17 2155) Vital Signs (24h Range):  Temp:  [100.8 °F (38.2 °C)-103.6 °F (39.8 °C)] 103.6 °F (39.8 °C)  Pulse:  [] 83  Resp:  [18-32] 20  SpO2:  [78 %-100 %] 99 %  BP: ()/(29-91) 103/50     Weight: 49.9 kg (110 lb)  Body mass index is 20.78 kg/m².    Physical Exam   Constitutional: She is oriented to person, place, and time. She appears well-developed and well-nourished. She is cooperative. No distress. Nasal cannula in place.   1L NC   HENT:   Head: Normocephalic and  atraumatic.   Right Ear: Decreased hearing is noted.   Left Ear: Decreased hearing is noted.   Mouth/Throat: Oropharynx is clear and moist. Mucous membranes are dry. Abnormal dentition.   edentulous   Eyes: EOM are normal. Pupils are equal, round, and reactive to light. No scleral icterus.   Neck: Normal range of motion. Neck supple. No tracheal deviation present.       Cardiovascular: Intact distal pulses.  An irregularly irregular rhythm present. Tachycardia present.    Pulmonary/Chest: Effort normal and breath sounds normal. No respiratory distress. She has no wheezes.   Abdominal: Soft. Bowel sounds are normal. She exhibits distension. There is generalized tenderness. There is no rigidity and no guarding.   Musculoskeletal: Normal range of motion. She exhibits no edema or tenderness.   Neurological: She is alert and oriented to person, place, and time. She has normal strength. GCS eye subscore is 4. GCS verbal subscore is 5. GCS motor subscore is 6.   Skin: Skin is warm, dry and intact. Bruising noted. No cyanosis. Nails show no clubbing.        Psychiatric: She has a normal mood and affect. Her speech is normal and behavior is normal. Thought content normal. Cognition and memory are normal.   Nursing note and vitals reviewed.       Significant Labs:   CBC:   Recent Labs  Lab 09/21/17  1608   WBC 10.54   HGB 10.4*   HCT 32.6*        CMP:   Recent Labs  Lab 09/21/17  1608      K 3.8      CO2 26   *   BUN 17   CREATININE 0.7   CALCIUM 8.6*   PROT 6.1   ALBUMIN 3.0*   BILITOT 2.7*   ALKPHOS 403*   *   *   ANIONGAP 11   EGFRNONAA >60     Cardiac Markers:   Recent Labs  Lab 09/21/17  1608   *  533*     Coagulation:   Recent Labs  Lab 09/21/17  1608   INR 3.5*   APTT 34.9*     Lactic Acid:   Recent Labs  Lab 09/21/17  1608 09/21/17  2049 09/21/17  2338   LACTATE 0.7 0.9 0.8     Lipase:   Recent Labs  Lab 09/21/17  1608   LIPASE >1000*     POCT Glucose: No results for  input(s): POCTGLUCOSE in the last 48 hours.  Troponin:   Recent Labs  Lab 09/21/17  1608   TROPONINI 0.018     TSH:   Recent Labs  Lab 09/21/17  1608   TSH 1.143     Urine Studies:   Recent Labs  Lab 09/21/17  1533   COLORU Yellow   APPEARANCEUA Clear   PHUR 6.0   SPECGRAV <=1.005*   PROTEINUA Negative   GLUCUA Negative   KETONESU Negative   BILIRUBINUA Negative   OCCULTUA Trace*   NITRITE Negative   UROBILINOGEN 1.0   LEUKOCYTESUR 1+*   RBCUA 3   WBCUA 2     Occult Blood Negative  Positive       Procalcitonin <0.25 ng/mL 0.59         All pertinent labs within the past 24 hours have been reviewed.    Significant Imaging: I have reviewed all pertinent imaging results/findings within the past 24 hours.     CTA Abdomen:   1.  No evidence of active extravasation of contrast into the bowel lumen.  No evidence of vascular occlusion.  Extensive atherosclerotic calcifications at the origins of the mesenteric vessels.  Some component of this may represent chronic mesenteric ischemia.  Suggest clinical correlation.    2.  Nonspecific wall thickening of the hepatic flexure, splenic flexure, and the distal descending colon.  No evidence of pneumatosis.  The findings may represent a nonspecific colitis.    3.  Status post cholecystectomy with worsening intrahepatic and extra hepatic biliary dilatation.  Outpatient MRCP may be obtained for further evaluation.    4.  Additional findings as above.    X-Ray Chest AP Portable:  Accentuation of the lung markings compared to prior study consider increased pulmonary venous pressure in this patient with cardiomegaly or underlying inflammatory or infectious process

## 2017-09-22 NOTE — ED NOTES
Awake and alert.  Oriented x 2 at this time.  resp even and unlabored, lungs clear.  C/O pain to abd at this time but unable to provide with number for pain level. Decreased bowel sounds noted to all quadrants.  abd distended but soft.

## 2017-09-22 NOTE — ASSESSMENT & PLAN NOTE
- suspect biliary pancreatitis with concern for cholangitis  - reviewed prior ERCP  - reverse coumadin  - PTC asap, discussed with Dr. Askew and appreciate his help  - monitor LFTs  - f/u GNR sensitivities   - continue IVF and abx

## 2017-09-23 PROBLEM — A41.50 GRAM NEGATIVE SEPTIC SHOCK: Status: RESOLVED | Noted: 2017-09-21 | Resolved: 2017-09-23

## 2017-09-23 PROBLEM — K85.10 ACUTE BILIARY PANCREATITIS: Status: RESOLVED | Noted: 2017-09-21 | Resolved: 2017-09-23

## 2017-09-23 PROBLEM — K92.2 UPPER GI BLEED: Status: RESOLVED | Noted: 2017-09-22 | Resolved: 2017-09-23

## 2017-09-23 PROBLEM — B96.20 E COLI BACTEREMIA: Status: ACTIVE | Noted: 2017-09-22

## 2017-09-23 PROBLEM — R79.1 SUPRATHERAPEUTIC INR: Status: RESOLVED | Noted: 2017-09-21 | Resolved: 2017-09-23

## 2017-09-23 PROBLEM — R65.21 GRAM NEGATIVE SEPTIC SHOCK: Status: RESOLVED | Noted: 2017-09-21 | Resolved: 2017-09-23

## 2017-09-23 PROBLEM — K92.0 COFFEE GROUND EMESIS: Status: RESOLVED | Noted: 2017-09-21 | Resolved: 2017-09-23

## 2017-09-23 LAB
ALBUMIN SERPL BCP-MCNC: 2.3 G/DL
ALP SERPL-CCNC: 270 U/L
ALT SERPL W/O P-5'-P-CCNC: 63 U/L
ANION GAP SERPL CALC-SCNC: 8 MMOL/L
AST SERPL-CCNC: 44 U/L
BASOPHILS # BLD AUTO: 0.02 K/UL
BASOPHILS NFR BLD: 0.2 %
BILIRUB DIRECT SERPL-MCNC: 1.5 MG/DL
BILIRUB SERPL-MCNC: 2.3 MG/DL
BUN SERPL-MCNC: 15 MG/DL
CALCIUM SERPL-MCNC: 8.1 MG/DL
CHLORIDE SERPL-SCNC: 105 MMOL/L
CO2 SERPL-SCNC: 27 MMOL/L
CREAT SERPL-MCNC: 0.7 MG/DL
DIFFERENTIAL METHOD: ABNORMAL
EOSINOPHIL # BLD AUTO: 0 K/UL
EOSINOPHIL NFR BLD: 0.3 %
ERYTHROCYTE [DISTWIDTH] IN BLOOD BY AUTOMATED COUNT: 15.8 %
EST. GFR  (AFRICAN AMERICAN): >60 ML/MIN/1.73 M^2
EST. GFR  (NON AFRICAN AMERICAN): >60 ML/MIN/1.73 M^2
GLUCOSE SERPL-MCNC: 115 MG/DL
GRAM STN SPEC: NORMAL
GRAM STN SPEC: NORMAL
HCT VFR BLD AUTO: 28.1 %
HCT VFR BLD AUTO: 28.1 %
HGB BLD-MCNC: 8.9 G/DL
HGB BLD-MCNC: 8.9 G/DL
INR PPP: 1.7
LIPASE SERPL-CCNC: 93 U/L
LYMPHOCYTES # BLD AUTO: 0.9 K/UL
LYMPHOCYTES NFR BLD: 7.2 %
MAGNESIUM SERPL-MCNC: 1.4 MG/DL
MCH RBC QN AUTO: 31.3 PG
MCHC RBC AUTO-ENTMCNC: 31.7 G/DL
MCV RBC AUTO: 99 FL
MONOCYTES # BLD AUTO: 0.8 K/UL
MONOCYTES NFR BLD: 6.3 %
NEUTROPHILS # BLD AUTO: 11.2 K/UL
NEUTROPHILS NFR BLD: 85.8 %
PHOSPHATE SERPL-MCNC: 3 MG/DL
PLATELET # BLD AUTO: 152 K/UL
PMV BLD AUTO: 9.7 FL
POTASSIUM SERPL-SCNC: 3.3 MMOL/L
PROT SERPL-MCNC: 5.1 G/DL
PROTHROMBIN TIME: 17.4 SEC
RBC # BLD AUTO: 2.84 M/UL
SODIUM SERPL-SCNC: 140 MMOL/L
WBC # BLD AUTO: 13.08 K/UL

## 2017-09-23 PROCEDURE — 25000003 PHARM REV CODE 250: Performed by: HOSPITALIST

## 2017-09-23 PROCEDURE — 25000003 PHARM REV CODE 250: Performed by: NURSE PRACTITIONER

## 2017-09-23 PROCEDURE — 80048 BASIC METABOLIC PNL TOTAL CA: CPT

## 2017-09-23 PROCEDURE — 83690 ASSAY OF LIPASE: CPT

## 2017-09-23 PROCEDURE — 27000221 HC OXYGEN, UP TO 24 HOURS

## 2017-09-23 PROCEDURE — 94761 N-INVAS EAR/PLS OXIMETRY MLT: CPT

## 2017-09-23 PROCEDURE — A4216 STERILE WATER/SALINE, 10 ML: HCPCS | Performed by: NURSE PRACTITIONER

## 2017-09-23 PROCEDURE — 83735 ASSAY OF MAGNESIUM: CPT

## 2017-09-23 PROCEDURE — C9113 INJ PANTOPRAZOLE SODIUM, VIA: HCPCS | Performed by: NURSE PRACTITIONER

## 2017-09-23 PROCEDURE — 63600175 PHARM REV CODE 636 W HCPCS: Performed by: NURSE PRACTITIONER

## 2017-09-23 PROCEDURE — 63600175 PHARM REV CODE 636 W HCPCS: Performed by: HOSPITALIST

## 2017-09-23 PROCEDURE — 11000001 HC ACUTE MED/SURG PRIVATE ROOM

## 2017-09-23 PROCEDURE — 85610 PROTHROMBIN TIME: CPT

## 2017-09-23 PROCEDURE — 80076 HEPATIC FUNCTION PANEL: CPT

## 2017-09-23 PROCEDURE — 84100 ASSAY OF PHOSPHORUS: CPT

## 2017-09-23 PROCEDURE — 85025 COMPLETE CBC W/AUTO DIFF WBC: CPT

## 2017-09-23 RX ORDER — FUROSEMIDE 40 MG/1
40 TABLET ORAL DAILY
Status: DISCONTINUED | OUTPATIENT
Start: 2017-09-24 | End: 2017-09-25 | Stop reason: HOSPADM

## 2017-09-23 RX ORDER — DOCUSATE SODIUM 100 MG/1
100 CAPSULE, LIQUID FILLED ORAL DAILY
Status: DISCONTINUED | OUTPATIENT
Start: 2017-09-23 | End: 2017-09-25 | Stop reason: HOSPADM

## 2017-09-23 RX ORDER — CARVEDILOL 25 MG/1
25 TABLET ORAL 2 TIMES DAILY WITH MEALS
Status: DISCONTINUED | OUTPATIENT
Start: 2017-09-23 | End: 2017-09-25 | Stop reason: HOSPADM

## 2017-09-23 RX ORDER — PANTOPRAZOLE SODIUM 40 MG/1
40 TABLET, DELAYED RELEASE ORAL DAILY
Status: DISCONTINUED | OUTPATIENT
Start: 2017-09-23 | End: 2017-09-25 | Stop reason: HOSPADM

## 2017-09-23 RX ORDER — LISINOPRIL 20 MG/1
40 TABLET ORAL NIGHTLY
Status: DISCONTINUED | OUTPATIENT
Start: 2017-09-23 | End: 2017-09-25 | Stop reason: HOSPADM

## 2017-09-23 RX ORDER — FUROSEMIDE 10 MG/ML
40 INJECTION INTRAMUSCULAR; INTRAVENOUS ONCE
Status: COMPLETED | OUTPATIENT
Start: 2017-09-23 | End: 2017-09-23

## 2017-09-23 RX ADMIN — PANTOPRAZOLE SODIUM 40 MG: 40 TABLET, DELAYED RELEASE ORAL at 01:09

## 2017-09-23 RX ADMIN — CARVEDILOL 25 MG: 25 TABLET, FILM COATED ORAL at 04:09

## 2017-09-23 RX ADMIN — CEFEPIME HYDROCHLORIDE 2 G: 2 INJECTION, SOLUTION INTRAVENOUS at 04:09

## 2017-09-23 RX ADMIN — CYANOCOBALAMIN TAB 1000 MCG 1000 MCG: 1000 TAB at 09:09

## 2017-09-23 RX ADMIN — SODIUM CHLORIDE, PRESERVATIVE FREE 3 ML: 5 INJECTION INTRAVENOUS at 05:09

## 2017-09-23 RX ADMIN — CETIRIZINE HYDROCHLORIDE 10 MG: 10 TABLET, FILM COATED ORAL at 09:09

## 2017-09-23 RX ADMIN — MORPHINE SULFATE 2 MG: 2 INJECTION, SOLUTION INTRAMUSCULAR; INTRAVENOUS at 05:09

## 2017-09-23 RX ADMIN — TRAMADOL HYDROCHLORIDE 50 MG: 50 TABLET, COATED ORAL at 04:09

## 2017-09-23 RX ADMIN — DOCUSATE SODIUM 100 MG: 100 CAPSULE, LIQUID FILLED ORAL at 01:09

## 2017-09-23 RX ADMIN — SODIUM CHLORIDE, PRESERVATIVE FREE 3 ML: 5 INJECTION INTRAVENOUS at 02:09

## 2017-09-23 RX ADMIN — DEXTROSE 8 MG/HR: 50 INJECTION, SOLUTION INTRAVENOUS at 07:09

## 2017-09-23 RX ADMIN — URSODIOL 300 MG: 300 CAPSULE ORAL at 09:09

## 2017-09-23 RX ADMIN — FUROSEMIDE 40 MG: 10 INJECTION, SOLUTION INTRAMUSCULAR; INTRAVENOUS at 01:09

## 2017-09-23 RX ADMIN — SODIUM CHLORIDE, PRESERVATIVE FREE 3 ML: 5 INJECTION INTRAVENOUS at 10:09

## 2017-09-23 RX ADMIN — TRAMADOL HYDROCHLORIDE 50 MG: 50 TABLET, COATED ORAL at 09:09

## 2017-09-23 RX ADMIN — DEXTROSE 8 MG/HR: 50 INJECTION, SOLUTION INTRAVENOUS at 02:09

## 2017-09-23 NOTE — PLAN OF CARE
Problem: Patient Care Overview  Goal: Plan of Care Review  Outcome: Ongoing (interventions implemented as appropriate)  Pt slept all night. Awake, alert, oriented X4. Afebrile throughout the night. Complaints of being cold, running low temps of 97.0 degrees F orally. Also complaints of abdominal right lower quadrant pain and back pain. PRN pain medication given per MAR. 's-120's/80's, HR a fib 80's-120's, sats 97% on 2 L Nc. Urine output about 2500 mL. Biliary drain output 300 mL. Safety precautions in place.

## 2017-09-23 NOTE — NURSING TRANSFER
Nursing Transfer Note      9/23/2017     Transfer ICU    Transfer via Hospital bed    Transfer with chart and patient blankets from home    Transported by Johanne AGUILAR and transport attendant    Medicines sent: yes    Chart send with patient: yes    Notified: family aware transport    Patient reassessed at: na    Upon arrival to floor:9/23/17 @1145

## 2017-09-23 NOTE — ANESTHESIA POSTPROCEDURE EVALUATION
"Anesthesia Post Evaluation    Patient: Jazmín Bishop    Procedure(s) Performed: Procedure(s) (LRB):  TRANSHEPATIC CHOLANGIOGRMA (N/A)    Final Anesthesia Type: general  Patient location during evaluation: PACU  Patient participation: Yes- Able to Participate  Level of consciousness: awake and alert  Post-procedure vital signs: reviewed and stable  Pain management: adequate  Airway patency: patent  PONV status at discharge: No PONV  Anesthetic complications: no      Cardiovascular status: hemodynamically stable  Respiratory status: spontaneous ventilation  Hydration status: euvolemic  Follow-up not needed.        Visit Vitals  /73 (BP Location: Left arm, Patient Position: Lying)   Pulse 77   Temp 36.1 °C (96.9 °F) (Oral)   Resp 16   Ht 4' 10" (1.473 m)   Wt 50.8 kg (111 lb 15.9 oz)   LMP  (LMP Unknown)   SpO2 98%   Breastfeeding? No   BMI 23.41 kg/m²       Pain/Steve Score: Pain Assessment Performed: Yes (9/23/2017 11:53 AM)  Presence of Pain: denies (9/23/2017  5:00 PM)  Pain Rating Prior to Med Admin: 7 (9/23/2017  4:40 PM)  Pain Rating Post Med Admin: 0 (9/23/2017  5:00 PM)      "

## 2017-09-23 NOTE — PROGRESS NOTES
[facility] Hospital Medicine  Progress Note    Patient Name: Jazmín Bishop  MRN: [id number]  Patient Class: IP- Inpatient   Admission Date: 9/21/2017  Length of Stay: 2 days  Attending Physician: Frank Noel MD  [facility]        Subjective:     Principal Problem:Gram negative septic shock    HPI:  Jazmín Bishop is a 82 y.o. white woman with hypertension, chronic systolic congestive heart failure, chronic atrial fibrillation (anticoagulated on warfarin), gastroesophageal reflux disease, osteoarthritis, history of pulmonary embolism, choledocholithiasis that was unable to be treated endoscopically (instead treated with ursodiol), vitamin B12 and iron deficiency anemia.  She lives at [facility] in [place], [place].  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  She never  and does not have any children.  Her primary care physician is Dr. Luis Sparrow.   She had an ERCP with sphincterotomy done at [facility] on 5/16/16 by Dr. 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  Biliary stones could not be removed due to the sharp angulation of the bile duct, which inhibited withdrawal of a balloon tipped catheter.  She was put on ursodiol and the plan was to repeat ERCP only if she had increasing cholestasis or cholangitis.   She returned to [facility] ED on 9/21/17 with 1 week of constant aching generalized abdominal pain, worst in the left upper quadrant and right lower quadrant, accompanied by fever, nausea, and vomiting.  She had coffee ground emesis noted.  She had recently been diagnosed with moderate ileus on 9/13/17 and was treated with bisacodyl and an enema.  She was found to have colitis, biliary pancreatitis, cholestasis, and sepsis (temperature 103.6 F, total bilirubin 2.7, , ,  alkaline phosphatase 403, lipase >1000, abdominal CT showing colitis and worsening intrahepatic and extrahepatic biliary dilatation.  She was given cefepime, vancomycin, acetaminophen, morphine, fentanyl, and 2.5 liters of normal saline.  She was admitted to [facility] with a Gastroenterology consult.    Hospital Course:  While in the ED her blood pressure decreased to 60s/30s requiring placement of a right internal jugular triple lumen central venous catheter and initiation of norepinephrine infusion.  Cefepime and ursodiol were continued as she awaited Gastroenterology.  Blood culture grew Gram negative rods.  Gastroenterology consulted Interventional Radiology to perform percutaneous transhepatic cholangiography and drain placement.  There were no stones but there was resistance at the ampulla suggesting a biliary stricture.  Sepsis and liver enzymes rapidly improved afterward.  She required furosemide after getting too much fluids, and diuresed well.      Interval History: Tolerated food.    Review of Systems   Constitutional: Negative for chills and fever.   Respiratory: Negative for cough and shortness of breath.      Objective:     Vital Signs (Most Recent):  Temp: 98.3 °F (36.8 °C) (09/23/17 1146)  Pulse: 80 (09/23/17 1146)  Resp: 20 (09/23/17 1146)  BP: (!) 160/90 (09/23/17 1146)  SpO2: (!) 90 % (09/23/17 1100) Vital Signs (24h Range):  Temp:  [97.4 °F (36.3 °C)-98.9 °F (37.2 °C)] 98.3 °F (36.8 °C)  Pulse:  [] 80  Resp:  [20-40] 20  SpO2:  [79 %-100 %] 90 %  BP: (111-168)/() 160/90     Weight: 50.8 kg (111 lb 15.9 oz)  Body mass index is 23.41 kg/m².    Intake/Output Summary (Last 24 hours) at 09/23/17 1302  Last data filed at 09/23/17 0903   Gross per 24 hour   Intake             1520 ml   Output             3015 ml   Net            -1495 ml      Physical Exam   Constitutional: She is oriented to person, place, and time. She appears well-developed. No distress.   Cardiovascular:  Normal rate and regular rhythm.    Pulmonary/Chest: Effort normal. No respiratory distress.   Abdominal: Soft.   Drain in place   Neurological: She is alert and oriented to person, place, and time.   Psychiatric: She has a normal mood and affect.   Nursing note and vitals reviewed.      Significant Labs:   CBC:   Recent Labs  Lab 09/21/17  1608 09/22/17  0500  09/22/17  1635 09/22/17  2347 09/23/17  0515   WBC 10.54 26.18*  --   --   --  13.08*   HGB 10.4* 10.1*  10.1*  < > 9.3* 9.4* 8.9*  8.9*   HCT 32.6* 31.4*  31.4*  < > 29.5* 30.0* 28.1*  28.1*    262  --   --   --  152   < > = values in this interval not displayed.  CMP:     Recent Labs  Lab 09/21/17  1608 09/22/17  0500 09/23/17  0515     --  140   K 3.8  --  3.3*     --  105   CO2 26  --  27   *  --  115*   BUN 17  --  15   CREATININE 0.7  --  0.7   CALCIUM 8.6*  --  8.1*   PROT 6.1 5.3* 5.1*   ALBUMIN 3.0* 2.6* 2.3*   BILITOT 2.7* 5.6* 2.3*   ALKPHOS 403* 329* 270*   * 90* 44*   * 88* 63*   ANIONGAP 11  --  8   EGFRNONAA >60  --  >60     All pertinent labs within the past 24 hours have been reviewed.    Significant Imaging: I have reviewed all pertinent imaging results/findings within the past 24 hours.     Procedures:  Percutaneous transhepatic cholangiography 9/22/17: 1.  Percutaneous Transhepatic Cholangiogram (PTC) 2.  Placement of an 8 Hungarian Flexima biliary catheter via the right biliary ductal system with fluoroscopic guidance.    PTC demonstrating the presence of the marked biliary dilation but no definite evidence for obstruction or stones within the biliary tree.  There was resistance at the level of the ampulla which could suggest a possible stricture.  Procedure performed by Felton Askew MD.    Assessment/Plan:      Gram-negative bacteremia    Biliary sepsis  Continue cefepime and follow up cultures.  Drain placed.  Since Dr. Geo Ruano no longer working here, will have to follow up with a different  gastroenterologist.          DNR (do not resuscitate)    Patient is DNR at nursing home and confirms that she would like to remain DNR while hospitalized.          History of pulmonary embolism              Nursing home resident    Resides at [facility] ([address], [place] [address])           Iron deficiency anemia secondary to inadequate dietary iron intake    Chronic, stable.  Daily CBC.   Holding iron supplement while NPO.  Continue to monitor.          Persistent atrial fibrillation    Current use of long term anticoagulation  Supratherapeutic INR  Takes warfarin 5 mg daily.  Holding for ERCP.        Chronic diastolic heart failure    Biatrial enlargement  Resume carvedilol, lisinopril, and furosemide.          OA (osteoarthritis)    PRN pain medication.          Essential hypertension    Resume carvedilol 25 mg BID and lisinopril 40 mg nightly.            VTE Risk Mitigation         Ordered     High Risk of VTE  Once      09/22/17 0039     Place RONNI hose  Until discontinued      09/22/17 0039     Place sequential compression device  Until discontinued      09/22/17 0039     Reason for No Pharmacological VTE Prophylaxis  Once      09/22/17 0039              Frank Noel MD  Department of Hospital Medicine   [facility]

## 2017-09-23 NOTE — ASSESSMENT & PLAN NOTE
Biliary sepsis  Continue cefepime and follow up cultures.  Drain placed.  Since Dr. Geo Ruano no longer working here, will have to follow up with a different gastroenterologist.

## 2017-09-23 NOTE — PLAN OF CARE
Pt complains of SOB, and trouble breathing. Instructed to take deep breaths and cough. Increased oxygen to 4 L NC and raised HOB. Sats 95-97%. Notified Np. New orders for Lasix and Albuterol treatment. Will continue to monitor.

## 2017-09-23 NOTE — NURSING
Pt AAO, VSS, denies any concerns, c/o abdominal pain, pt being transferred via bed to IR with anesthesia at bedside and staff, on 2L NC, no signs of distress noted.

## 2017-09-23 NOTE — SUBJECTIVE & OBJECTIVE
Interval History: Tolerated food.    Review of Systems   Constitutional: Negative for chills and fever.   Respiratory: Negative for cough and shortness of breath.      Objective:     Vital Signs (Most Recent):  Temp: 98.3 °F (36.8 °C) (09/23/17 1146)  Pulse: 80 (09/23/17 1146)  Resp: 20 (09/23/17 1146)  BP: (!) 160/90 (09/23/17 1146)  SpO2: (!) 90 % (09/23/17 1100) Vital Signs (24h Range):  Temp:  [97.4 °F (36.3 °C)-98.9 °F (37.2 °C)] 98.3 °F (36.8 °C)  Pulse:  [] 80  Resp:  [20-40] 20  SpO2:  [79 %-100 %] 90 %  BP: (111-168)/() 160/90     Weight: 50.8 kg (111 lb 15.9 oz)  Body mass index is 23.41 kg/m².    Intake/Output Summary (Last 24 hours) at 09/23/17 1302  Last data filed at 09/23/17 0903   Gross per 24 hour   Intake             1520 ml   Output             3015 ml   Net            -1495 ml      Physical Exam   Constitutional: She is oriented to person, place, and time. She appears well-developed. No distress.   Cardiovascular: Normal rate and regular rhythm.    Pulmonary/Chest: Effort normal. No respiratory distress.   Abdominal: Soft.   Drain in place   Neurological: She is alert and oriented to person, place, and time.   Psychiatric: She has a normal mood and affect.   Nursing note and vitals reviewed.      Significant Labs:   CBC:   Recent Labs  Lab 09/21/17  1608 09/22/17  0500  09/22/17  1635 09/22/17  2347 09/23/17  0515   WBC 10.54 26.18*  --   --   --  13.08*   HGB 10.4* 10.1*  10.1*  < > 9.3* 9.4* 8.9*  8.9*   HCT 32.6* 31.4*  31.4*  < > 29.5* 30.0* 28.1*  28.1*    262  --   --   --  152   < > = values in this interval not displayed.  CMP:     Recent Labs  Lab 09/21/17  1608 09/22/17  0500 09/23/17  0515     --  140   K 3.8  --  3.3*     --  105   CO2 26  --  27   *  --  115*   BUN 17  --  15   CREATININE 0.7  --  0.7   CALCIUM 8.6*  --  8.1*   PROT 6.1 5.3* 5.1*   ALBUMIN 3.0* 2.6* 2.3*   BILITOT 2.7* 5.6* 2.3*   ALKPHOS 403* 329* 270*   * 90* 44*    * 88* 63*   ANIONGAP 11  --  8   EGFRNONAA >60  --  >60     All pertinent labs within the past 24 hours have been reviewed.    Significant Imaging: I have reviewed all pertinent imaging results/findings within the past 24 hours.     Procedures:  Percutaneous transhepatic cholangiography 9/22/17: 1.  Percutaneous Transhepatic Cholangiogram (PTC) 2.  Placement of an 8 [place]mese Flexima biliary catheter via the right biliary ductal system with fluoroscopic guidance.    PTC demonstrating the presence of the marked biliary dilation but no definite evidence for obstruction or stones within the biliary tree.  There was resistance at the level of the ampulla which could suggest a possible stricture.  Procedure performed by Felton Askew MD.

## 2017-09-23 NOTE — NURSING
Attempted to call report to Deepti AGUILAR, she is with a pt and states she will call back for report in 15mins

## 2017-09-23 NOTE — NURSING TRANSFER
Nursing Transfer Note      9/23/2017     Transfer To: Room 260    Transfer via bed    Transfer with  to O2, cardiac monitoring    Transported by staff    Medicines sent: none    Chart send with patient: Yes    Notified: none    Patient reassessed at: 09/22/2017@ 1645    Upon arrival to floor: cardiac monitor applied, patient oriented to room, call bell in reach and bed in lowest position

## 2017-09-23 NOTE — PLAN OF CARE
Problem: Patient Care Overview  Goal: Plan of Care Review  Outcome: Ongoing (interventions implemented as appropriate)  Patient on oxygen with documented flow.  Will attempt to wean per O2 order protocol. .Will continue to monitor.

## 2017-09-23 NOTE — NURSING TRANSFER
Nursing Transfer Note      9/23/2017     Transfer To: 5A Room 5301    Transfer via bed    Transfer with chart and 1 medication nasal spray    Transported by nurse/transport    Medicines sent: nasal apray    Chart send with patient: Yes    Notified: none per pt    Patient reassessed at: 09/23/2017@1038    Upon arrival to floor: patient oriented to room, call bell in reach and bed in lowest position

## 2017-09-24 PROBLEM — R74.01 TRANSAMINITIS: Status: RESOLVED | Noted: 2017-09-21 | Resolved: 2017-09-24

## 2017-09-24 PROBLEM — K83.09 BILIARY SEPSIS: Status: RESOLVED | Noted: 2017-09-21 | Resolved: 2017-09-24

## 2017-09-24 PROBLEM — K83.1 BILIARY STRICTURE: Status: ACTIVE | Noted: 2017-09-23

## 2017-09-24 LAB
ALBUMIN SERPL BCP-MCNC: 2.2 G/DL
ALP SERPL-CCNC: 225 U/L
ALT SERPL W/O P-5'-P-CCNC: 43 U/L
ANION GAP SERPL CALC-SCNC: 6 MMOL/L
AST SERPL-CCNC: 23 U/L
BACTERIA BLD CULT: NORMAL
BASOPHILS # BLD AUTO: 0.02 K/UL
BASOPHILS NFR BLD: 0.3 %
BILIRUB DIRECT SERPL-MCNC: 1 MG/DL
BILIRUB SERPL-MCNC: 1.6 MG/DL
BUN SERPL-MCNC: 11 MG/DL
CALCIUM SERPL-MCNC: 8.1 MG/DL
CHLORIDE SERPL-SCNC: 103 MMOL/L
CO2 SERPL-SCNC: 30 MMOL/L
CREAT SERPL-MCNC: 0.6 MG/DL
DIFFERENTIAL METHOD: ABNORMAL
EOSINOPHIL # BLD AUTO: 0.2 K/UL
EOSINOPHIL NFR BLD: 2.8 %
ERYTHROCYTE [DISTWIDTH] IN BLOOD BY AUTOMATED COUNT: 15.3 %
EST. GFR  (AFRICAN AMERICAN): >60 ML/MIN/1.73 M^2
EST. GFR  (NON AFRICAN AMERICAN): >60 ML/MIN/1.73 M^2
GLUCOSE SERPL-MCNC: 86 MG/DL
HCT VFR BLD AUTO: 27.6 %
HGB BLD-MCNC: 8.7 G/DL
INR PPP: 1.3
LYMPHOCYTES # BLD AUTO: 0.8 K/UL
LYMPHOCYTES NFR BLD: 9.8 %
MCH RBC QN AUTO: 31.4 PG
MCHC RBC AUTO-ENTMCNC: 31.5 G/DL
MCV RBC AUTO: 100 FL
MONOCYTES # BLD AUTO: 0.8 K/UL
MONOCYTES NFR BLD: 9.8 %
NEUTROPHILS # BLD AUTO: 6.1 K/UL
NEUTROPHILS NFR BLD: 77.2 %
PLATELET # BLD AUTO: 148 K/UL
PMV BLD AUTO: 10.1 FL
POCT GLUCOSE: 106 MG/DL (ref 70–110)
POTASSIUM SERPL-SCNC: 3.1 MMOL/L
PROT SERPL-MCNC: 5.1 G/DL
PROTHROMBIN TIME: 13.4 SEC
RBC # BLD AUTO: 2.77 M/UL
SODIUM SERPL-SCNC: 139 MMOL/L
WBC # BLD AUTO: 7.88 K/UL

## 2017-09-24 PROCEDURE — 36415 COLL VENOUS BLD VENIPUNCTURE: CPT

## 2017-09-24 PROCEDURE — 80076 HEPATIC FUNCTION PANEL: CPT

## 2017-09-24 PROCEDURE — 80048 BASIC METABOLIC PNL TOTAL CA: CPT

## 2017-09-24 PROCEDURE — 11000001 HC ACUTE MED/SURG PRIVATE ROOM

## 2017-09-24 PROCEDURE — 63600175 PHARM REV CODE 636 W HCPCS: Performed by: HOSPITALIST

## 2017-09-24 PROCEDURE — 99900035 HC TECH TIME PER 15 MIN (STAT)

## 2017-09-24 PROCEDURE — 99232 SBSQ HOSP IP/OBS MODERATE 35: CPT | Mod: ,,, | Performed by: INTERNAL MEDICINE

## 2017-09-24 PROCEDURE — A4216 STERILE WATER/SALINE, 10 ML: HCPCS | Performed by: NURSE PRACTITIONER

## 2017-09-24 PROCEDURE — 85025 COMPLETE CBC W/AUTO DIFF WBC: CPT

## 2017-09-24 PROCEDURE — 63600175 PHARM REV CODE 636 W HCPCS: Performed by: NURSE PRACTITIONER

## 2017-09-24 PROCEDURE — 25000003 PHARM REV CODE 250: Performed by: HOSPITALIST

## 2017-09-24 PROCEDURE — 85610 PROTHROMBIN TIME: CPT

## 2017-09-24 PROCEDURE — 94761 N-INVAS EAR/PLS OXIMETRY MLT: CPT

## 2017-09-24 PROCEDURE — 25000003 PHARM REV CODE 250: Performed by: NURSE PRACTITIONER

## 2017-09-24 PROCEDURE — 27000221 HC OXYGEN, UP TO 24 HOURS

## 2017-09-24 RX ORDER — POTASSIUM CHLORIDE 20 MEQ/1
40 TABLET, EXTENDED RELEASE ORAL
Status: COMPLETED | OUTPATIENT
Start: 2017-09-24 | End: 2017-09-24

## 2017-09-24 RX ORDER — POTASSIUM CHLORIDE 20 MEQ/1
40 TABLET, EXTENDED RELEASE ORAL ONCE
Status: COMPLETED | OUTPATIENT
Start: 2017-09-24 | End: 2017-09-24

## 2017-09-24 RX ORDER — AMOXICILLIN AND CLAVULANATE POTASSIUM 875; 125 MG/1; MG/1
1 TABLET, FILM COATED ORAL EVERY 12 HOURS
Status: DISCONTINUED | OUTPATIENT
Start: 2017-09-24 | End: 2017-09-24

## 2017-09-24 RX ORDER — AMOXICILLIN AND CLAVULANATE POTASSIUM 875; 125 MG/1; MG/1
1 TABLET, FILM COATED ORAL EVERY 12 HOURS
Status: DISCONTINUED | OUTPATIENT
Start: 2017-09-24 | End: 2017-09-25 | Stop reason: HOSPADM

## 2017-09-24 RX ORDER — MAGNESIUM SULFATE HEPTAHYDRATE 40 MG/ML
2 INJECTION, SOLUTION INTRAVENOUS ONCE
Status: COMPLETED | OUTPATIENT
Start: 2017-09-24 | End: 2017-09-24

## 2017-09-24 RX ADMIN — TRAMADOL HYDROCHLORIDE 50 MG: 50 TABLET, COATED ORAL at 09:09

## 2017-09-24 RX ADMIN — CYANOCOBALAMIN TAB 1000 MCG 1000 MCG: 1000 TAB at 09:09

## 2017-09-24 RX ADMIN — LACTOBACILLUS TAB 4 TABLET: TAB at 05:09

## 2017-09-24 RX ADMIN — SODIUM CHLORIDE, PRESERVATIVE FREE 3 ML: 5 INJECTION INTRAVENOUS at 06:09

## 2017-09-24 RX ADMIN — MAGNESIUM SULFATE IN WATER 2 G: 40 INJECTION, SOLUTION INTRAVENOUS at 01:09

## 2017-09-24 RX ADMIN — TRAMADOL HYDROCHLORIDE 50 MG: 50 TABLET, COATED ORAL at 12:09

## 2017-09-24 RX ADMIN — CARVEDILOL 25 MG: 25 TABLET, FILM COATED ORAL at 05:09

## 2017-09-24 RX ADMIN — SODIUM CHLORIDE, PRESERVATIVE FREE 3 ML: 5 INJECTION INTRAVENOUS at 10:09

## 2017-09-24 RX ADMIN — CEFEPIME HYDROCHLORIDE 2 G: 2 INJECTION, SOLUTION INTRAVENOUS at 04:09

## 2017-09-24 RX ADMIN — TRAMADOL HYDROCHLORIDE 50 MG: 50 TABLET, COATED ORAL at 05:09

## 2017-09-24 RX ADMIN — CETIRIZINE HYDROCHLORIDE 10 MG: 10 TABLET, FILM COATED ORAL at 09:09

## 2017-09-24 RX ADMIN — FLUTICASONE PROPIONATE 1 SPRAY: 50 SPRAY, METERED NASAL at 09:09

## 2017-09-24 RX ADMIN — LISINOPRIL 40 MG: 20 TABLET ORAL at 12:09

## 2017-09-24 RX ADMIN — POTASSIUM CHLORIDE 40 MEQ: 20 TABLET, EXTENDED RELEASE ORAL at 05:09

## 2017-09-24 RX ADMIN — URSODIOL 300 MG: 300 CAPSULE ORAL at 08:09

## 2017-09-24 RX ADMIN — POTASSIUM CHLORIDE 40 MEQ: 20 TABLET, EXTENDED RELEASE ORAL at 01:09

## 2017-09-24 RX ADMIN — SODIUM CHLORIDE, PRESERVATIVE FREE 3 ML: 5 INJECTION INTRAVENOUS at 01:09

## 2017-09-24 RX ADMIN — DOCUSATE SODIUM 100 MG: 100 CAPSULE, LIQUID FILLED ORAL at 09:09

## 2017-09-24 RX ADMIN — CARVEDILOL 25 MG: 25 TABLET, FILM COATED ORAL at 09:09

## 2017-09-24 RX ADMIN — AMOXICILLIN AND CLAVULANATE POTASSIUM 1 TABLET: 875; 125 TABLET, FILM COATED ORAL at 08:09

## 2017-09-24 RX ADMIN — PANTOPRAZOLE SODIUM 40 MG: 40 TABLET, DELAYED RELEASE ORAL at 09:09

## 2017-09-24 RX ADMIN — FUROSEMIDE 40 MG: 40 TABLET ORAL at 09:09

## 2017-09-24 RX ADMIN — ONDANSETRON 8 MG: 2 INJECTION INTRAMUSCULAR; INTRAVENOUS at 06:09

## 2017-09-24 RX ADMIN — URSODIOL 300 MG: 300 CAPSULE ORAL at 09:09

## 2017-09-24 NOTE — SUBJECTIVE & OBJECTIVE
Subjective:     Interval History: Feels much better, afebrile. Lisseth regular diet. No abd pain, n/v    Review of Systems   Constitutional: Negative for appetite change, chills and fever.   Gastrointestinal: Negative for abdominal distention, abdominal pain, anal bleeding and nausea.   Musculoskeletal: Negative for arthralgias.   Skin: Negative for color change and pallor.   Psychiatric/Behavioral: Negative for agitation and behavioral problems.     Objective:     Vital Signs (Most Recent):  Temp: 96.6 °F (35.9 °C) (09/24/17 0747)  Pulse: 101 (09/24/17 0747)  Resp: 18 (09/24/17 0747)  BP: (!) 145/83 (09/24/17 0747)  SpO2: (!) 93 % (09/24/17 0901) Vital Signs (24h Range):  Temp:  [96.6 °F (35.9 °C)-98.4 °F (36.9 °C)] 96.6 °F (35.9 °C)  Pulse:  [] 101  Resp:  [16-40] 18  SpO2:  [90 %-99 %] 93 %  BP: (108-160)/(57-90) 145/83     Weight: 42.5 kg (93 lb 11.1 oz) (09/24/17 0430)  Body mass index is 19.58 kg/m².      Intake/Output Summary (Last 24 hours) at 09/24/17 0940  Last data filed at 09/24/17 0700   Gross per 24 hour   Intake              800 ml   Output             1730 ml   Net             -930 ml       Lines/Drains/Airways     Central Venous Catheter Line                 Percutaneous Central Line Insertion/Assessment - single lumen  09/21/17 1647 right internal jugular 2 days          Drain                 Closed/Suction Drain 09/22/17 1540 Right RLQ Other (Comment) 8 Fr. 1 day                Physical Exam   Constitutional: She is oriented to person, place, and time. She appears well-developed and well-nourished. No distress.   HENT:   Head: Normocephalic and atraumatic.   Eyes: Conjunctivae are normal. No scleral icterus.   Neck: Normal range of motion. Neck supple.   Pulmonary/Chest: Effort normal. No respiratory distress.   Abdominal: Soft. Bowel sounds are normal. She exhibits no distension. There is no tenderness.   Neurological: She is alert and oriented to person, place, and time.   Skin: Skin is warm  and dry. No rash noted. She is not diaphoretic. No erythema.   Psychiatric: She has a normal mood and affect. Her behavior is normal.   Nursing note and vitals reviewed.      Significant Labs:  CBC:   Recent Labs  Lab 09/22/17  2347 09/23/17  0515 09/24/17  0452   WBC  --  13.08* 7.88   HGB 9.4* 8.9*  8.9* 8.7*   HCT 30.0* 28.1*  28.1* 27.6*   PLT  --  152 148*         Significant Imaging:  Imaging results within the past 24 hours have been reviewed.

## 2017-09-24 NOTE — PROGRESS NOTES
[facility]  Gastroenterology  Progress Note    Patient Name: Jazmín Bishop  MRN: [id number]  Admission Date: 9/21/2017  Hospital Length of Stay: 3 days  Code Status: DNR   Attending Provider: Frank Noel MD  Consulting Provider: Elizabeth Allen MD  Primary Care Physician: [facility]  Principal Problem: Gram negative septic shock      Subjective:     Interval History: Feels much better, afebrile. Lisseth regular diet. No abd pain, n/v    Review of Systems   Constitutional: Negative for appetite change, chills and fever.   Gastrointestinal: Negative for abdominal distention, abdominal pain, anal bleeding and nausea.   Musculoskeletal: Negative for arthralgias.   Skin: Negative for color change and pallor.   Psychiatric/Behavioral: Negative for agitation and behavioral problems.     Objective:     Vital Signs (Most Recent):  Temp: 96.6 °F (35.9 °C) (09/24/17 0747)  Pulse: 101 (09/24/17 0747)  Resp: 18 (09/24/17 0747)  BP: (!) 145/83 (09/24/17 0747)  SpO2: (!) 93 % (09/24/17 0901) Vital Signs (24h Range):  Temp:  [96.6 °F (35.9 °C)-98.4 °F (36.9 °C)] 96.6 °F (35.9 °C)  Pulse:  [] 101  Resp:  [16-40] 18  SpO2:  [90 %-99 %] 93 %  BP: (108-160)/(57-90) 145/83     Weight: 42.5 kg (93 lb 11.1 oz) (09/24/17 0430)  Body mass index is 19.58 kg/m².      Intake/Output Summary (Last 24 hours) at 09/24/17 0940  Last data filed at 09/24/17 0700   Gross per 24 hour   Intake              800 ml   Output             1730 ml   Net             -930 ml       Lines/Drains/Airways     Central Venous Catheter Line                 Percutaneous Central Line Insertion/Assessment - single lumen  09/21/17 1647 right internal jugular 2 days          Drain                 Closed/Suction Drain 09/22/17 1540 Right RLQ Other (Comment) 8 Fr. 1 day                Physical Exam   Constitutional: She is oriented to person, place, and time. She appears well-developed and well-nourished. No distress.   HENT:    Head: Normocephalic and atraumatic.   Eyes: Conjunctivae are normal. No scleral icterus.   Neck: Normal range of motion. Neck supple.   Pulmonary/Chest: Effort normal. No respiratory distress.   Abdominal: Soft. Bowel sounds are normal. She exhibits no distension. There is no tenderness.   Neurological: She is alert and oriented to person, place, and time.   Skin: Skin is warm and dry. No rash noted. She is not diaphoretic. No erythema.   Psychiatric: She has a normal mood and affect. Her behavior is normal.   Nursing note and vitals reviewed.      Significant Labs:  CBC:   Recent Labs  Lab 09/22/17  2347 09/23/17  0515 09/24/17  0452   WBC  --  13.08* 7.88   HGB 9.4* 8.9*  8.9* 8.7*   HCT 30.0* 28.1*  28.1* 27.6*   PLT  --  152 148*         Significant Imaging:  Imaging results within the past 24 hours have been reviewed.    Assessment/Plan:     Biliary sepsis    - biliary pancreatitis s/p PTC, good drainage  - cont abx x 7 days  - would leave drain in as outpt, f/u with advanced GI clinic in 2-3 weeks to discuss removal and possible ERCP for stone extraction in the future  - LFTs/WBC much improved  - laxmi reg diet  - ok to d/c from my standpoint            Thank you for your consult. I will follow-up with patient. Please contact us if you have any additional questions.    Elizabeth Allen MD  Gastroenterology  [facility]

## 2017-09-24 NOTE — ANESTHESIA RELEASE NOTE
"Anesthesia Release from PACU Note    Patient: Jazmín Bishop    Procedure(s) Performed: Procedure(s) (LRB):  TRANSHEPATIC CHOLANGIOGRMA (N/A)    Anesthesia type: general    Post pain: Adequate analgesia    Post assessment: no apparent anesthetic complications    Last Vitals:   Visit Vitals  /73 (BP Location: Left arm, Patient Position: Lying)   Pulse 77   Temp 36.1 °C (96.9 °F) (Oral)   Resp 16   Ht 4' 10" (1.473 m)   Wt 50.8 kg (111 lb 15.9 oz)   LMP  (LMP Unknown)   SpO2 98%   Breastfeeding? No   BMI 23.41 kg/m²       Post vital signs: stable    Level of consciousness: awake    Nausea/Vomiting: no nausea/no vomiting    Complications: none    Airway Patency: patent    Respiratory: unassisted, spontaneous ventilation    Cardiovascular: stable and blood pressure at baseline    Hydration: euvolemic  "

## 2017-09-24 NOTE — PLAN OF CARE
Problem: Patient Care Overview  Goal: Plan of Care Review  Pt received on 2 lpm NC with SpO2  93%. Pt on prn therapy. No tx needed at this time. No respiratory distress noted. Will continue to monitor.

## 2017-09-24 NOTE — SUBJECTIVE & OBJECTIVE
Interval History: Doing well.    Review of Systems   Constitutional: Negative for chills and fever.   Respiratory: Negative for cough and shortness of breath.      Objective:     Vital Signs (Most Recent):  Temp: 98 °F (36.7 °C) (09/24/17 1223)  Pulse: 66 (09/24/17 1223)  Resp: 17 (09/24/17 1223)  BP: 103/61 (09/24/17 1223)  SpO2: (!) 92 % (09/24/17 1155) Vital Signs (24h Range):  Temp:  [96.6 °F (35.9 °C)-98.4 °F (36.9 °C)] 98 °F (36.7 °C)  Pulse:  [] 66  Resp:  [16-18] 17  SpO2:  [92 %-98 %] 92 %  BP: (103-145)/(57-83) 103/61     Weight: 42.5 kg (93 lb 11.1 oz)  Body mass index is 19.58 kg/m².    Intake/Output Summary (Last 24 hours) at 09/24/17 1225  Last data filed at 09/24/17 0942   Gross per 24 hour   Intake              915 ml   Output             2030 ml   Net            -1115 ml      Physical Exam   Constitutional: She is oriented to person, place, and time. She appears well-developed. No distress.   Cardiovascular: Normal rate and regular rhythm.    Pulmonary/Chest: Effort normal. No respiratory distress.   Abdominal: Soft.   Drain in place   Neurological: She is alert and oriented to person, place, and time.   Psychiatric: She has a normal mood and affect.   Nursing note and vitals reviewed.      Significant Labs:   CBC:     Recent Labs  Lab 09/22/17  2347 09/23/17  0515 09/24/17  0452   WBC  --  13.08* 7.88   HGB 9.4* 8.9*  8.9* 8.7*   HCT 30.0* 28.1*  28.1* 27.6*   PLT  --  152 148*     CMP:     Recent Labs  Lab 09/23/17  0515 09/24/17  0452    139   K 3.3* 3.1*    103   CO2 27 30*   * 86   BUN 15 11   CREATININE 0.7 0.6   CALCIUM 8.1* 8.1*   PROT 5.1* 5.1*   ALBUMIN 2.3* 2.2*   BILITOT 2.3* 1.6*   ALKPHOS 270* 225*   AST 44* 23   ALT 63* 43   ANIONGAP 8 6*   EGFRNONAA >60 >60     All pertinent labs within the past 24 hours have been reviewed.    Significant Imaging: I have reviewed all pertinent imaging results/findings within the past 24 hours.

## 2017-09-24 NOTE — PLAN OF CARE
Problem: Patient Care Overview  Goal: Plan of Care Review  Reviewed plan of care with pt.  Patient verbalized understanding.  Will monitor pt. Throughout shift.

## 2017-09-24 NOTE — ASSESSMENT & PLAN NOTE
Biatrial enlargement  Continue carvedilol, lisinopril, and furosemide.  Give potassium and magnesium for hypokalemia and hypomagnesemia.

## 2017-09-24 NOTE — ASSESSMENT & PLAN NOTE
Biliary stricture  Change cefepime to oral amoxicillin-clavulanate, and give Lactobacillus.  Drain placed.  Since Dr. Geo Ruano no longer working here, will have to follow up with a different gastroenterologist.

## 2017-09-24 NOTE — ASSESSMENT & PLAN NOTE
- biliary pancreatitis s/p PTC, good drainage  - cont abx x 7 days  - would leave drain in as outpt, f/u with advanced GI clinic in 2-3 weeks to discuss removal and possible ERCP for stone extraction in the future  - LFTs/WBC much improved  - laxmi reg diet  - ok to d/c from my standpoint

## 2017-09-24 NOTE — PLAN OF CARE
Problem: Patient Care Overview  Goal: Plan of Care Review  Outcome: Ongoing (interventions implemented as appropriate)  Plan of care discussed with patient.  Verbalized understanding.  Patient remained AAOx4 throughout shift.  On bedrest but able to turn and adjust position in bed.  Telemetry order d/c'd.  Potassium and mag replaced due to low levels.  Biliary drain emptied x 2.  Tolerating diet well.  Resting comfortably in bed.  Will continue to monitor.

## 2017-09-24 NOTE — PLAN OF CARE
Problem: Patient Care Overview  Goal: Plan of Care Review  Outcome: Ongoing (interventions implemented as appropriate)  Received pt on 2L NC; no distress noted. Will cont to monitor.

## 2017-09-24 NOTE — PROGRESS NOTES
[facility] Hospital Medicine  Progress Note    Patient Name: Jazmín Bishop  MRN: [id number]  Patient Class: IP- Inpatient   Admission Date: 9/21/2017  Length of Stay: 3 days  Attending Physician: Frank Noel MD  Primary Care Provider: [facility]        Subjective:     Principal Problem:Gram negative septic shock    HPI:  Jazmín Bishop is a 82 y.o. white woman with hypertension, chronic systolic congestive heart failure, chronic atrial fibrillation (anticoagulated on warfarin), gastroesophageal reflux disease, osteoarthritis, history of pulmonary embolism, choledocholithiasis that was unable to be treated endoscopically (instead treated with ursodiol), vitamin B12 and iron deficiency anemia.  She lives at [facility] in [place], [place].  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  She never  and does not have any children.  Her primary care physician is Dr. Luis Sparrow.   She had an ERCP with sphincterotomy done at [facility] on 5/16/16 by Dr. 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  Biliary stones could not be removed due to the sharp angulation of the bile duct, which inhibited withdrawal of a balloon tipped catheter.  She was put on ursodiol and the plan was to repeat ERCP only if she had increasing cholestasis or cholangitis.   She returned to [facility] ED on 9/21/17 with 1 week of constant aching generalized abdominal pain, worst in the left upper quadrant and right lower quadrant, accompanied by fever, nausea, and vomiting.  She had coffee ground emesis noted.  She had recently been diagnosed with moderate ileus on 9/13/17 and was treated with bisacodyl and an enema.  She was found to have colitis, biliary pancreatitis, cholestasis, and sepsis (temperature 103.6 F, total bilirubin 2.7, , ,  alkaline phosphatase 403, lipase >1000, abdominal CT showing colitis and worsening intrahepatic and extrahepatic biliary dilatation.  She was given cefepime, vancomycin, acetaminophen, morphine, fentanyl, and 2.5 liters of normal saline.  She was admitted to [facility] with a Gastroenterology consult.    Hospital Course:  While in the ED her blood pressure decreased to 60s/30s requiring placement of a right internal jugular triple lumen central venous catheter and initiation of norepinephrine infusion.  Cefepime and ursodiol were continued as she awaited Gastroenterology.  Blood culture grew Gram negative rods.  Gastroenterology consulted Interventional Radiology to perform percutaneous transhepatic cholangiography and drain placement.  There were no stones but there was resistance at the ampulla suggesting a biliary stricture.  Sepsis and liver enzymes rapidly improved afterward.  She required furosemide after getting too much fluids, and diuresed well.  Blood culture grew E coli and biliary drain culture grew Enterococcus.    Interval History: Doing well.    Review of Systems   Constitutional: Negative for chills and fever.   Respiratory: Negative for cough and shortness of breath.      Objective:     Vital Signs (Most Recent):  Temp: 98 °F (36.7 °C) (09/24/17 1223)  Pulse: 66 (09/24/17 1223)  Resp: 17 (09/24/17 1223)  BP: 103/61 (09/24/17 1223)  SpO2: (!) 92 % (09/24/17 1155) Vital Signs (24h Range):  Temp:  [96.6 °F (35.9 °C)-98.4 °F (36.9 °C)] 98 °F (36.7 °C)  Pulse:  [] 66  Resp:  [16-18] 17  SpO2:  [92 %-98 %] 92 %  BP: (103-145)/(57-83) 103/61     Weight: 42.5 kg (93 lb 11.1 oz)  Body mass index is 19.58 kg/m².    Intake/Output Summary (Last 24 hours) at 09/24/17 1225  Last data filed at 09/24/17 0942   Gross per 24 hour   Intake              915 ml   Output             2030 ml   Net            -1115 ml      Physical Exam   Constitutional: She is oriented to person, place, and time. She  appears well-developed. No distress.   Cardiovascular: Normal rate and regular rhythm.    Pulmonary/Chest: Effort normal. No respiratory distress.   Abdominal: Soft.   Drain in place   Neurological: She is alert and oriented to person, place, and time.   Psychiatric: She has a normal mood and affect.   Nursing note and vitals reviewed.      Significant Labs:   CBC:     Recent Labs  Lab 09/22/17  2347 09/23/17  0515 09/24/17  0452   WBC  --  13.08* 7.88   HGB 9.4* 8.9*  8.9* 8.7*   HCT 30.0* 28.1*  28.1* 27.6*   PLT  --  152 148*     CMP:     Recent Labs  Lab 09/23/17  0515 09/24/17  0452    139   K 3.3* 3.1*    103   CO2 27 30*   * 86   BUN 15 11   CREATININE 0.7 0.6   CALCIUM 8.1* 8.1*   PROT 5.1* 5.1*   ALBUMIN 2.3* 2.2*   BILITOT 2.3* 1.6*   ALKPHOS 270* 225*   AST 44* 23   ALT 63* 43   ANIONGAP 8 6*   EGFRNONAA >60 >60     All pertinent labs within the past 24 hours have been reviewed.    Significant Imaging: I have reviewed all pertinent imaging results/findings within the past 24 hours.     Assessment/Plan:      E coli bacteremia    Biliary stricture  Change cefepime to oral amoxicillin-clavulanate, and give Lactobacillus.  Drain placed.  Since Dr. Geo Ruano no longer working here, will have to follow up with a different gastroenterologist.          DNR (do not resuscitate)    Patient is DNR at nursing home and confirms that she would like to remain DNR while hospitalized.          History of pulmonary embolism              Nursing home resident    Resides at [facility] ([address] [facility], [place] [address])           Iron deficiency anemia secondary to inadequate dietary iron intake    Chronic, stable.  Daily CBC.   Holding iron supplement while NPO.  Continue to monitor.          Persistent atrial fibrillation    Current use of long term anticoagulation  Supratherapeutic INR  Takes warfarin 5 mg daily.  Holding for ERCP.        Chronic diastolic heart  failure    Biatrial enlargement  Continue carvedilol, lisinopril, and furosemide.  Give potassium and magnesium for hypokalemia and hypomagnesemia.          OA (osteoarthritis)    PRN pain medication.          Essential hypertension    Continue carvedilol 25 mg BID and lisinopril 40 mg nightly.            VTE Risk Mitigation         Ordered     High Risk of VTE  Once      09/22/17 0039     Place RONNI hose  Until discontinued      09/22/17 0039     Place sequential compression device  Until discontinued      09/22/17 0039     Reason for No Pharmacological VTE Prophylaxis  Once      09/22/17 0039        Disposition: Back to [facility] home tomorrow.      Frank Noel MD  Department of Hospital Medicine   [facility]

## 2017-09-25 VITALS
TEMPERATURE: 98 F | HEIGHT: 58 IN | RESPIRATION RATE: 17 BRPM | OXYGEN SATURATION: 92 % | WEIGHT: 93.69 LBS | HEART RATE: 67 BPM | SYSTOLIC BLOOD PRESSURE: 106 MMHG | BODY MASS INDEX: 19.67 KG/M2 | DIASTOLIC BLOOD PRESSURE: 59 MMHG

## 2017-09-25 PROBLEM — E80.6 HYPERBILIRUBINEMIA: Status: RESOLVED | Noted: 2017-09-21 | Resolved: 2017-09-25

## 2017-09-25 LAB
ALBUMIN SERPL BCP-MCNC: 2.2 G/DL
ALP SERPL-CCNC: 204 U/L
ALT SERPL W/O P-5'-P-CCNC: 34 U/L
ANION GAP SERPL CALC-SCNC: 6 MMOL/L
AST SERPL-CCNC: 15 U/L
BACTERIA BLD CULT: NORMAL
BACTERIA SPEC AEROBE CULT: NORMAL
BACTERIA SPEC AEROBE CULT: NORMAL
BASOPHILS # BLD AUTO: 0.03 K/UL
BASOPHILS NFR BLD: 0.4 %
BILIRUB DIRECT SERPL-MCNC: 0.7 MG/DL
BILIRUB SERPL-MCNC: 1.1 MG/DL
BUN SERPL-MCNC: 14 MG/DL
CALCIUM SERPL-MCNC: 8.3 MG/DL
CHLORIDE SERPL-SCNC: 102 MMOL/L
CO2 SERPL-SCNC: 31 MMOL/L
CREAT SERPL-MCNC: 0.7 MG/DL
DIFFERENTIAL METHOD: ABNORMAL
EOSINOPHIL # BLD AUTO: 0.3 K/UL
EOSINOPHIL NFR BLD: 3.6 %
ERYTHROCYTE [DISTWIDTH] IN BLOOD BY AUTOMATED COUNT: 15.1 %
EST. GFR  (AFRICAN AMERICAN): >60 ML/MIN/1.73 M^2
EST. GFR  (NON AFRICAN AMERICAN): >60 ML/MIN/1.73 M^2
GLUCOSE SERPL-MCNC: 89 MG/DL
HCT VFR BLD AUTO: 27 %
HGB BLD-MCNC: 8.5 G/DL
INR PPP: 1.2
LYMPHOCYTES # BLD AUTO: 0.7 K/UL
LYMPHOCYTES NFR BLD: 10.8 %
MCH RBC QN AUTO: 31.5 PG
MCHC RBC AUTO-ENTMCNC: 31.5 G/DL
MCV RBC AUTO: 100 FL
MONOCYTES # BLD AUTO: 0.8 K/UL
MONOCYTES NFR BLD: 12.1 %
NEUTROPHILS # BLD AUTO: 5 K/UL
NEUTROPHILS NFR BLD: 72.8 %
PLATELET # BLD AUTO: 142 K/UL
PMV BLD AUTO: 10 FL
POTASSIUM SERPL-SCNC: 4 MMOL/L
PROT SERPL-MCNC: 5.3 G/DL
PROTHROMBIN TIME: 13.1 SEC
RBC # BLD AUTO: 2.7 M/UL
SODIUM SERPL-SCNC: 139 MMOL/L
WBC # BLD AUTO: 6.85 K/UL

## 2017-09-25 PROCEDURE — 27000221 HC OXYGEN, UP TO 24 HOURS

## 2017-09-25 PROCEDURE — 85610 PROTHROMBIN TIME: CPT

## 2017-09-25 PROCEDURE — 94761 N-INVAS EAR/PLS OXIMETRY MLT: CPT

## 2017-09-25 PROCEDURE — A4216 STERILE WATER/SALINE, 10 ML: HCPCS | Performed by: NURSE PRACTITIONER

## 2017-09-25 PROCEDURE — 25000003 PHARM REV CODE 250: Performed by: HOSPITALIST

## 2017-09-25 PROCEDURE — 25000003 PHARM REV CODE 250: Performed by: NURSE PRACTITIONER

## 2017-09-25 PROCEDURE — 80048 BASIC METABOLIC PNL TOTAL CA: CPT

## 2017-09-25 PROCEDURE — 80076 HEPATIC FUNCTION PANEL: CPT

## 2017-09-25 PROCEDURE — 85025 COMPLETE CBC W/AUTO DIFF WBC: CPT

## 2017-09-25 RX ORDER — AMOXICILLIN AND CLAVULANATE POTASSIUM 875; 125 MG/1; MG/1
1 TABLET, FILM COATED ORAL EVERY 12 HOURS
Qty: 12 TABLET | Refills: 0
Start: 2017-09-25 | End: 2017-10-01

## 2017-09-25 RX ORDER — L. ACIDOPHILUS/L.BULGARICUS 100MM CELL
1 GRANULES IN PACKET (EA) ORAL 2 TIMES DAILY
COMMUNITY
Start: 2017-09-25 | End: 2017-10-01

## 2017-09-25 RX ADMIN — DOCUSATE SODIUM 100 MG: 100 CAPSULE, LIQUID FILLED ORAL at 09:09

## 2017-09-25 RX ADMIN — URSODIOL 300 MG: 300 CAPSULE ORAL at 09:09

## 2017-09-25 RX ADMIN — PANTOPRAZOLE SODIUM 40 MG: 40 TABLET, DELAYED RELEASE ORAL at 09:09

## 2017-09-25 RX ADMIN — CETIRIZINE HYDROCHLORIDE 10 MG: 10 TABLET, FILM COATED ORAL at 09:09

## 2017-09-25 RX ADMIN — TRAMADOL HYDROCHLORIDE 50 MG: 50 TABLET, COATED ORAL at 01:09

## 2017-09-25 RX ADMIN — AMOXICILLIN AND CLAVULANATE POTASSIUM 1 TABLET: 875; 125 TABLET, FILM COATED ORAL at 09:09

## 2017-09-25 RX ADMIN — LACTOBACILLUS TAB 4 TABLET: TAB at 08:09

## 2017-09-25 RX ADMIN — CARVEDILOL 25 MG: 25 TABLET, FILM COATED ORAL at 08:09

## 2017-09-25 RX ADMIN — SODIUM CHLORIDE, PRESERVATIVE FREE 3 ML: 5 INJECTION INTRAVENOUS at 05:09

## 2017-09-25 RX ADMIN — TRAMADOL HYDROCHLORIDE 50 MG: 50 TABLET, COATED ORAL at 09:09

## 2017-09-25 RX ADMIN — CYANOCOBALAMIN TAB 1000 MCG 1000 MCG: 1000 TAB at 09:09

## 2017-09-25 RX ADMIN — LISINOPRIL 40 MG: 20 TABLET ORAL at 05:09

## 2017-09-25 RX ADMIN — FUROSEMIDE 40 MG: 40 TABLET ORAL at 09:09

## 2017-09-25 NOTE — SUBJECTIVE & OBJECTIVE
Interval History: Doing well.    Review of Systems   Constitutional: Negative for chills and fever.   Respiratory: Negative for cough and shortness of breath.      Objective:     Vital Signs (Most Recent):  Temp: 98 °F (36.7 °C) (09/25/17 0754)  Pulse: 90 (09/25/17 0754)  Resp: 17 (09/25/17 0754)  BP: (!) 141/64 (09/25/17 0754)  SpO2: 96 % (09/25/17 0441) Vital Signs (24h Range):  Temp:  [96.2 °F (35.7 °C)-98 °F (36.7 °C)] 98 °F (36.7 °C)  Pulse:  [58-90] 90  Resp:  [17-20] 17  SpO2:  [92 %-99 %] 96 %  BP: ()/(52-70) 141/64     Weight: 42.5 kg (93 lb 11.1 oz)  Body mass index is 19.58 kg/m².    Intake/Output Summary (Last 24 hours) at 09/25/17 0755  Last data filed at 09/25/17 0600   Gross per 24 hour   Intake              165 ml   Output             1910 ml   Net            -1745 ml      Physical Exam   Constitutional: She is oriented to person, place, and time. She appears well-developed. No distress.   Cardiovascular: Normal rate and regular rhythm.    Pulmonary/Chest: Effort normal. No respiratory distress.   Abdominal: Soft.   Drain in place   Neurological: She is alert and oriented to person, place, and time.   Psychiatric: She has a normal mood and affect.   Nursing note and vitals reviewed.      Significant Labs:   CBC:     Recent Labs  Lab 09/24/17 0452 09/25/17 0354   WBC 7.88 6.85   HGB 8.7* 8.5*   HCT 27.6* 27.0*   * 142*     CMP:     Recent Labs  Lab 09/24/17 0452 09/25/17 0354    139   K 3.1* 4.0    102   CO2 30* 31*   GLU 86 89   BUN 11 14   CREATININE 0.6 0.7   CALCIUM 8.1* 8.3*   PROT 5.1* 5.3*   ALBUMIN 2.2* 2.2*   BILITOT 1.6* 1.1*   ALKPHOS 225* 204*   AST 23 15   ALT 43 34   ANIONGAP 6* 6*   EGFRNONAA >60 >60     All pertinent labs within the past 24 hours have been reviewed.    Significant Imaging: I have reviewed all pertinent imaging results/findings within the past 24 hours.

## 2017-09-25 NOTE — PLAN OF CARE
[facility]  Emory Yadav MD, [facility]     MD Elgin Llanes FNP Renee Melancon, PA-C Rosanne Zeringue, NP  [address] [place], [place] [address]  Office: [phone]  Fax: [phone]       NURSING HOME ORDERS    Patient Name: Jazmín Bishop  YOB: [birth year]    Admit to Nursing Home:  Regular Bed     [facility] ([facility] [place] [address])     Diagnoses:  Active Hospital Problems    Diagnosis  POA    E coli bacteremia [R78.81]  Yes     Priority: 1 - High    Biliary stricture [K83.1]  Yes    DNR (do not resuscitate) [Z66]  Yes     Chronic    Current use of long term anticoagulation [Z79.01]  Not Applicable     Chronic    History of pulmonary embolism [Z86.711]  Yes     Chronic    Iron deficiency anemia secondary to inadequate dietary iron intake [D50.8]  Yes    Persistent atrial fibrillation [I48.1]  Yes     Chronic    Chronic diastolic heart failure [I50.32]  Yes     Chronic    Biatrial enlargement [I51.7]  Yes     Chronic    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 ([facility] [place] [address])       Essential hypertension [I10]  Yes     Chronic    OA (osteoarthritis) [M19.90]  Yes     Chronic      Resolved Hospital Problems    Diagnosis Date Resolved POA    *Gram negative septic shock [A41.50, R65.21] 09/23/2017 Yes    Upper GI bleed [K92.2] 09/23/2017 Yes    Acute biliary pancreatitis [K85.10] 09/23/2017 Yes    Biliary sepsis [K83.0] 09/24/2017 Yes    Coffee ground emesis [K92.0] 09/23/2017 Yes    Transaminitis [R74.0] 09/24/2017 Yes    Supratherapeutic INR [R79.1] 09/23/2017 Yes    Hyperbilirubinemia [E80.6] 09/25/2017 Yes     Allergies:  Review of patient's allergies indicates:   Allergen Reactions    Codeine sulfate Other  (See Comments)     CONFUSION         Discharge Procedure Orders  Diet Adult Regular   Order Specific Question Answer Comments   Additional restrictions: Low Sodium,2gm      Vital signs per facility protocol     Skin assessment every shift      Activity as tolerated     DNR (Do Not Resuscitate)         LABS:  Per facility protocol    Nursing Precautions:         - Fall precautions per nursing home protocol    MISCELLANEOUS CARE:              PTC drain care: Empty bag and keep sit clean.    Medications: Only changes are new amoxicillin-clavulanate and Lactobacillus for 6 days.   Dario Leahlorraine Spencer   Home Medication Instructions ALANNA:[id number]    Printed on:09/25/17 7035   Medication Information                      acetaminophen (TYLENOL) 500 MG tablet  Take 500 mg by mouth every 6 (six) hours as needed for Pain.             albuterol-ipratropium 2.5mg-0.5mg/3mL (DUO-NEB) 0.5 mg-3 mg(2.5 mg base)/3 mL nebulizer solution  Take 3 mLs by nebulization every 6 (six) hours as needed for Wheezing. Rescue             aluminum & magnesium hydroxide-simethicone (MYLANTA MAX STRENGTH) 400-400-40 mg/5 mL suspension  Take 20 mLs by mouth every 6 (six) hours as needed for Indigestion.             amoxicillin-clavulanate 875-125mg (AUGMENTIN) 875-125 mg per tablet  Take 1 tablet by mouth every 12 (twelve) hours. Until 10/1/17 for E coli bacteremia and cholangitis.             ascorbic acid, vitamin C, (VITAMIN C) 500 MG tablet  Take 500 mg by mouth 3 (three) times daily.             aspirin (ECOTRIN) 81 MG EC tablet  Take 81 mg by mouth once daily.             carvedilol (COREG) 25 MG tablet  Take 1 tablet (25 mg total) by mouth 2 (two) times daily with meals.             cyanocobalamin (VITAMIN B-12) 1000 MCG tablet  Take 1 tablet (1,000 mcg total) by mouth once daily.             docusate sodium (COLACE) 100 MG capsule  Take 100 mg by mouth once daily.             ferrous sulfate 325 (65 FE) MG EC tablet  Take 1 tablet (325 mg  total) by mouth 3 (three) times daily with meals.             fluticasone (FLONASE) 50 mcg/actuation nasal spray  1 spray by Each Nare route once daily.             furosemide (LASIX) 40 MG tablet  Take 1 tablet (40 mg total) by mouth once daily.             hydrOXYzine pamoate (VISTARIL) 25 MG Cap  Take 1 capsule (25 mg total) by mouth nightly as needed.             hyoscyamine (ANASPAZ,LEVSIN) 0.125 mg Tab  Take 125 mcg by mouth every 6 (six) hours as needed (abdominal pain).              lactobacillus acidophilus & bulgar (LACTINEX) 100 million cell packet  Take 1 packet (1 each total) by mouth 2 (two) times daily. Until 10/1/17 while taking antibiotics.             lisinopril (PRINIVIL,ZESTRIL) 20 MG tablet  Take 2 tablets (40 mg total) by mouth nightly.             loperamide (IMODIUM) 2 mg capsule  Take 2 mg by mouth every 2 (two) hours as needed for Diarrhea.             loratadine (CLARITIN) 10 mg tablet  Take 10 mg by mouth once daily.             methocarbamol (ROBAXIN) 500 MG Tab  Take 1 tablet (500 mg total) by mouth 2 (two) times daily as needed.             ondansetron (ZOFRAN) 4 MG tablet  Take 4 mg by mouth every 6 (six) hours as needed for Nausea.             pantoprazole (PROTONIX) 40 MG tablet  Take 40 mg by mouth once daily.             potassium chloride SA (K-DUR,KLOR-CON) 10 MEQ tablet  Take 10 mEq by mouth once daily.              promethazine (PHENERGAN) 25 MG tablet  Take 1 tablet (25 mg total) by mouth daily as needed for Nausea.             simethicone (MYLICON) 80 MG chewable tablet  Take 80 mg by mouth every 6 (six) hours as needed for Flatulence.             sucralfate (CARAFATE) 1 gram tablet  Take 1 g by mouth 2 (two) times daily.             tramadol (ULTRAM) 50 mg tablet  Take 50 mg by mouth every 8 (eight) hours as needed for Pain.             ursodiol (ACTIGALL) 300 mg capsule  Take 1 capsule (300 mg total) by mouth 2 (two) times daily.             warfarin (COUMADIN) 3 MG  tablet  Take 1 tablet (3 mg total) by mouth Daily.                       _________________________________  Frank Noel MD  09/25/2017

## 2017-09-25 NOTE — ASSESSMENT & PLAN NOTE
Biliary stricture  Continue oral amoxicillin-clavulanate, and give Lactobacillus.  Drain placed.  Since Dr. Geo Ruano no longer working here, will have to follow up with a different gastroenterologist.

## 2017-09-25 NOTE — DISCHARGE SUMMARY
[facility]  Discharge Summary      Patient Name: Jazmín Bishop  MRN: [id number]  Admission Date: 9/21/2017  Hospital Length of Stay: 4 days  Discharge Date and Time: 9/25/2017  3:15 PM  Attending Physician: Frank Noel MD   Discharging Provider: Frank Noel MD  Primary Care Provider: [facility]      HPI:   Jazmín Bishop is a 82 y.o. white woman with hypertension, chronic systolic congestive heart failure, chronic atrial fibrillation (anticoagulated on warfarin), gastroesophageal reflux disease, osteoarthritis, history of pulmonary embolism, choledocholithiasis that was unable to be treated endoscopically (instead treated with ursodiol), vitamin B12 and iron deficiency anemia.  She lives at [facility] in [place], [place].  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  She never  and does not have any children.  Her primary care physician is Dr. Luis Sparrow.   She had an ERCP with sphincterotomy done at [facility] on 5/16/16 by Dr. 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  Biliary stones could not be removed due to the sharp angulation of the bile duct, which inhibited withdrawal of a balloon tipped catheter.  She was put on ursodiol and the plan was to repeat ERCP only if she had increasing cholestasis or cholangitis.   She returned to [facility] ED on 9/21/17 with 1 week of constant aching generalized abdominal pain, worst in the left upper quadrant and right lower quadrant, accompanied by fever, nausea, and vomiting.  She had coffee ground emesis noted.  She had recently been diagnosed with moderate ileus on 9/13/17 and was treated with bisacodyl and an enema.  She was found to have colitis, biliary pancreatitis, cholestasis, and sepsis (temperature 103.6 F, total bilirubin 2.7, ,  , alkaline phosphatase 403, lipase >1000, abdominal CT showing colitis and worsening intrahepatic and extrahepatic biliary dilatation.  She was given cefepime, vancomycin, acetaminophen, morphine, fentanyl, and 2.5 liters of normal saline.  [facility] was admitted to [facility] with a Gastroenterology consult.    Procedure(s) (LRB):  TRANSHEPATIC CHOLANGIOGRMA (N/A)   Percutaneous transhepatic cholangiography 9/22/17: 1.  Percutaneous Transhepatic Cholangiogram (PTC) 2.  Placement of an 8 Romansh Flexima biliary catheter via the right biliary ductal system with fluoroscopic guidance.    PTC demonstrating the presence of the marked biliary dilation but no definite evidence for obstruction or stones within the biliary tree.  There was resistance at the level of the ampulla which could suggest a possible stricture.  Procedure performed by Felton Askew MD.     Indwelling Lines/Drains at time of discharge:   Lines/Drains/Airways     Central Venous Catheter Line                 Percutaneous Central Line Insertion/Assessment - single lumen  09/21/17 1647 right internal jugular 3 days          Drain                 Closed/Suction Drain 09/22/17 1540 Right RLQ Other (Comment) 8 Fr. 2 days              Hospital Course:   While in the ED her blood pressure decreased to 60s/30s requiring placement of a right internal jugular triple lumen central venous catheter and initiation of norepinephrine infusion.  Cefepime and ursodiol were continued as she awaited Gastroenterology.  Blood culture grew Gram negative rods.  Gastroenterology consulted Interventional Radiology to perform percutaneous transhepatic cholangiography and drain placement.  There were no stones but there was resistance at the ampulla suggesting a biliary stricture.  Sepsis and liver enzymes rapidly improved afterward.  She required furosemide after getting too much fluids, and diuresed well.  Blood culture grew E coli and biliary drain culture grew  Enterococcus.  She was put on amoxicillin-clavulanate for another 7 days and discharged back to the [facility] on 9/25/17 with the drain in place.  She will follow up with Gastroenterology.     Consults:   Consults         Status Ordering Provider     Inpatient consult to Anesthesiology  Once     Provider:  Osmar Maciel MD    Acknowledged ZACH ASKEW     Inpatient consult to [facility]  Once     Provider:  Elizabeth Allen MD    Completed NEGRITO LOZADA     Inpatient consult to Interventional Radiology  Once     Specialty:  Interventional Radiology  Provider:  Zach Askew MD    Completed ELIZABETH ALLEN          Significant Diagnostic Studies:     Recent Labs  Lab 09/23/17  0515 09/24/17  0452 09/25/17  0354    139 139   K 3.3* 3.1* 4.0    103 102   CO2 27 30* 31*   BUN 15 11 14   CREATININE 0.7 0.6 0.7   CALCIUM 8.1* 8.1* 8.3*   PROT 5.1* 5.1* 5.3*   BILITOT 2.3* 1.6* 1.1*   ALKPHOS 270* 225* 204*   ALT 63* 43 34   AST 44* 23 15     X-Ray Chest AP Portable 9/21/17: There is a central line entering from the right, distal tip in the SVC.  The cardiac silhouette is enlarged.  There is increased interstitial lung markings seen throughout both lung fields, it is accentuated compared to prior studies.  It is nonspecific and may be due to increased pulmonary venous pressure are normal  Inflammatory or infectious process.  There is certainly no area of focal airspace disease.  No pleural effusion.  No pneumothorax.  There is atherosclerotic changes of the aorta.  Impression: Accentuation of the lung markings compared to prior study consider increased pulmonary venous pressure in this patient with cardiomegaly or underlying inflammatory or infectious process  CTA Abdomen 9/21/17:   Noncontrast CT scan of the abdomen examination:  There postoperative changes of prior cholecystectomy.  There are also postoperative changes in the gastroesophageal junction.  There are extensive  atherosclerotic calcifications of the abdominal aorta and its branch vessels.  No evidence of mesenteric or portal venous air is identified.  CT angiogram examination:  There is no evidence of active extravasation of contrast into the bowel lumen. There is normal tapering of the abdominal aorta without evidence of aneurysm.  No intimal flap is noted to suggest a dissection.  Extensive plaques at the origins of the bilateral renal arteries.  Extensive plaque formation is identified at the origin of the celiac axis with post stenotic dilatation.  There also extensive calcifications of the origins of the SMA and ANTHONY.  There is no evidence of vascular occlusion.  CT scan abdomen and pelvis examination:  There are trace bilateral pleural effusions.  There is a 4 mm calcified granuloma in the right lung base.  There is increased attenuation of the pulmonary parenchyma.  The heart is enlarged.  There is no evidence of lymphadenopathy in the abdomen or pelvis.  There are postoperative changes at EG junction.  Evaluation of the stomach is slightly limited by motion.  There is no evidence of gastric outlet obstruction.  The duodenum is unremarkable.  There is mild wall thickening of the small bowel loops.  No transition point is identified.  The appendix is not visualized.  There are no secondary findings of acute appendicitis.  There is nonspecific wall thickening involving the hepatic flexure.  Minimal wall thickening is also identified in the distal descending colon in the region of the splenic flexure.  No significant diverticular disease is identified.  The liver is enlarged.  The patient is status post cholecystectomy.  Previously described stones in the biliary system are not visualized.  There is worsening of the intrahepatic and extrahepatic biliary dilatation.  The spleen is enlarged.  There are splenules present.  There is a stable 10 mm hypodensity along the lateral aspect of the spleen.  The pancreas is within  normal limits.  The adrenal glands are unremarkable.  The kidneys are normal in appearance.  The ureters and the urinary bladder are within normal limits.  The patient is status post hysterectomy.  There is no evidence of free fluid in the abdomen or pelvis.  There is no evidence of free air.  There is no evidence of pneumatosis.  The psoas margins are unremarkable.  The abdominal wall is within normal limits.  There are degenerative changes in the osseous structures.  There is no evidence of a fracture.  Impression:  1.  No evidence of active extravasation of contrast into the bowel lumen.  No evidence of vascular occlusion.  Extensive atherosclerotic calcifications at the origins of the mesenteric vessels.  Some component of this may represent chronic mesenteric ischemia.  Suggest clinical correlation.  2.  Nonspecific wall thickening of the hepatic flexure, splenic flexure, and the distal descending colon.  No evidence of pneumatosis.  The findings may represent a nonspecific colitis.  3.  Status post cholecystectomy with worsening intrahepatic and extra hepatic biliary dilatation.  Outpatient MRCP may be obtained for further evaluation.  4.  Additional findings as above.    Pending Diagnostic Studies:     Procedure Component Value Units Date/Time    IR Biliary Drain Catheter Placement [[id number]] Updated:  09/22/17 1602    Order Status:  Sent Lab Status:  In process     IR Biliary Transhepatic Cholangiogram [[id number]]     Order Status:  Sent Lab Status:  No result     IR Cholecystostomy [[id number]]     Order Status:  Sent Lab Status:  No result     IR Ultrasound Guidance [[id number]] Updated:  09/22/17 1602    Order Status:  Sent Lab Status:  In process         Final Active Diagnoses:    Diagnosis Date Noted POA    E coli bacteremia [R78.81] 09/22/2017 Yes    Biliary stricture [K83.1] 09/23/2017 Yes    DNR (do not resuscitate) [Z66] 09/22/2017 Yes     Chronic    Current use of long term anticoagulation  [Z79.01] 09/22/2017 Not Applicable     Chronic    History of pulmonary embolism [Z86.711] 09/21/2017 Yes     Chronic    Iron deficiency anemia secondary to inadequate dietary iron intake [D50.8] 02/08/2017 Yes    Persistent atrial fibrillation [I48.1] 02/06/2017 Yes     Chronic    Chronic diastolic heart failure [I50.32] 02/06/2017 Yes     Chronic    Biatrial enlargement [I51.7] 02/06/2017 Yes     Chronic    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] 05/16/2016 Not Applicable    Nursing home resident [Z59.3] 02/07/2016 Not Applicable     Chronic    Essential hypertension [I10] 07/03/2014 Yes     Chronic    OA (osteoarthritis) [M19.90] 07/03/2014 Yes     Chronic      Problems Resolved During this Admission:    Diagnosis Date Noted Date Resolved POA    PRINCIPAL PROBLEM:  Gram negative septic shock [A41.50, R65.21] 09/21/2017 09/23/2017 Yes    Upper GI bleed [K92.2] 09/22/2017 09/23/2017 Yes    Acute biliary pancreatitis [K85.10] 09/21/2017 09/23/2017 Yes    Biliary sepsis [K83.0] 09/21/2017 09/24/2017 Yes    Coffee ground emesis [K92.0] 09/21/2017 09/23/2017 Yes    Transaminitis [R74.0] 09/21/2017 09/24/2017 Yes    Supratherapeutic INR [R79.1] 09/21/2017 09/23/2017 Yes    Hyperbilirubinemia [E80.6] 09/21/2017 09/25/2017 Yes      Discharged Condition: good    Disposition: Long Term Care - [facility] ([address] [place] [address])     Follow Up:  Follow-up Information     Mike Nuñez MD In 2 weeks.    Specialty:  Gastroenterology  Contact information:  [address] [facility]  [address] [place], [place] [address] [phone]                 Patient Instructions:     Diet Adult Regular   Order Specific Question Answer Comments   Additional restrictions: Low Sodium,2gm      Vital signs per facility protocol     Skin assessment every shift      Activity as tolerated     DNR (Do Not Resuscitate)       Medications:  Reconciled Home Medications:   Current Discharge Medication List       START taking these medications    Details   amoxicillin-clavulanate 875-125mg (AUGMENTIN) 875-125 mg per tablet Take 1 tablet by mouth every 12 (twelve) hours. Until 10/1/17 for E coli bacteremia and cholangitis.  Qty: 12 tablet, Refills: 0      lactobacillus acidophilus & bulgar (LACTINEX) 100 million cell packet Take 1 packet (1 each total) by mouth 2 (two) times daily. Until 10/1/17 while taking antibiotics.         CONTINUE these medications which have NOT CHANGED    Details   acetaminophen (TYLENOL) 500 MG tablet Take 500 mg by mouth every 6 (six) hours as needed for Pain.      albuterol-ipratropium 2.5mg-0.5mg/3mL (DUO-NEB) 0.5 mg-3 mg(2.5 mg base)/3 mL nebulizer solution Take 3 mLs by nebulization every 6 (six) hours as needed for Wheezing. Rescue      aluminum & magnesium hydroxide-simethicone (MYLANTA MAX STRENGTH) 400-400-40 mg/5 mL suspension Take 20 mLs by mouth every 6 (six) hours as needed for Indigestion.      ascorbic acid, vitamin C, (VITAMIN C) 500 MG tablet Take 500 mg by mouth 3 (three) times daily.      aspirin (ECOTRIN) 81 MG EC tablet Take 81 mg by mouth once daily.      carvedilol (COREG) 25 MG tablet Take 1 tablet (25 mg total) by mouth 2 (two) times daily with meals.  Qty: 180 tablet, Refills: 4      docusate sodium (COLACE) 100 MG capsule Take 100 mg by mouth once daily.      fluticasone (FLONASE) 50 mcg/actuation nasal spray 1 spray by Each Nare route once daily.      furosemide (LASIX) 40 MG tablet Take 1 tablet (40 mg total) by mouth once daily.  Qty: 30 tablet, Refills: 6    Associated Diagnoses: Essential hypertension; Chronic diastolic congestive heart failure      hydrOXYzine pamoate (VISTARIL) 25 MG Cap Take 1 capsule (25 mg total) by mouth nightly as needed.  Qty: 30 capsule, Refills: 3    Associated Diagnoses: Psychophysiological insomnia      hyoscyamine (ANASPAZ,LEVSIN) 0.125 mg Tab Take 125 mcg by mouth every 6 (six) hours as needed (abdominal pain).       lisinopril  (PRINIVIL,ZESTRIL) 20 MG tablet Take 2 tablets (40 mg total) by mouth nightly.  Qty: 180 tablet, Refills: 5      loperamide (IMODIUM) 2 mg capsule Take 2 mg by mouth every 2 (two) hours as needed for Diarrhea.      loratadine (CLARITIN) 10 mg tablet Take 10 mg by mouth once daily.      methocarbamol (ROBAXIN) 500 MG Tab Take 1 tablet (500 mg total) by mouth 2 (two) times daily as needed.  Qty: 60 tablet, Refills: 3    Associated Diagnoses: Muscle spasms of neck      ondansetron (ZOFRAN) 4 MG tablet Take 4 mg by mouth every 6 (six) hours as needed for Nausea.      pantoprazole (PROTONIX) 40 MG tablet Take 40 mg by mouth once daily.      potassium chloride SA (K-DUR,KLOR-CON) 10 MEQ tablet Take 10 mEq by mouth once daily.       promethazine (PHENERGAN) 25 MG tablet Take 1 tablet (25 mg total) by mouth daily as needed for Nausea.  Qty: 30 tablet, Refills: 3    Associated Diagnoses: Chronic nausea      simethicone (MYLICON) 80 MG chewable tablet Take 80 mg by mouth every 6 (six) hours as needed for Flatulence.      sucralfate (CARAFATE) 1 gram tablet Take 1 g by mouth 2 (two) times daily.      tramadol (ULTRAM) 50 mg tablet Take 50 mg by mouth every 8 (eight) hours as needed for Pain.      ursodiol (ACTIGALL) 300 mg capsule Take 1 capsule (300 mg total) by mouth 2 (two) times daily.  Qty: 60 capsule, Refills: 11      warfarin (COUMADIN) 3 MG tablet Take 1 tablet (3 mg total) by mouth Daily.  Qty: 30 tablet, Refills: 6      cyanocobalamin (VITAMIN B-12) 1000 MCG tablet Take 1 tablet (1,000 mcg total) by mouth once daily.  Qty: 90 tablet, Refills: 3      ferrous sulfate 325 (65 FE) MG EC tablet Take 1 tablet (325 mg total) by mouth 3 (three) times daily with meals.  Qty: 270 tablet, Refills: 3           Time spent on the discharge of patient: 35 minutes      Frank Noel MD  Department of Hospital Medicine  [facility]

## 2017-09-25 NOTE — PLAN OF CARE
Problem: Patient Care Overview  Goal: Plan of Care Review  Reviewed plan of care with pt.  Patient verbalized understanding.

## 2017-09-25 NOTE — ASSESSMENT & PLAN NOTE
Current use of long term anticoagulation  Supratherapeutic INR  Takes warfarin 5 mg daily.  Resume on discharge today.

## 2017-09-25 NOTE — PLAN OF CARE
Problem: Patient Care Overview  Goal: Plan of Care Review  Outcome: Ongoing (interventions implemented as appropriate)  Patient on oxygen with documented flow of 2 LPM.  Will attempt to wean per O2 order protocol. Will continue to monitor.

## 2017-09-25 NOTE — PLAN OF CARE
"TN received call from Mrs. Naylor at [facility] that orders received and report can be called to nurse at [phone] Wing 1 room 117b. TN notified JEFF Duval floor nuse and discharge packet left at nurses' station for [facility] transporter. Mrs. Naylor to call TN back with time transporter should arrive to get pt.    1240- TN called [facility] as TN has not received call from Mrs. Naylor on time for . TN was informed van should be here in "about an hour." TN informed fllor nurse and pt. TN also asked pt if she would like TN to notify family of discharge and pt stated , "NO." Pt's belongings packed and JEFF Gonzales floor nurse aware of central line needing to be removed.  NO further needs noted.       09/25/17 1136   Final Note   Assessment Type Final Discharge Note   Discharge Disposition correction Nu   What phone number can be called within the next 1-3 days to see how you are doing after discharge? [phone]   Hospital Follow Up  Appt(s) scheduled? (gi office will sundar with appt date/time)   Discharge plans and expectations educations in teach back method with documentation complete? Yes   Right Care Referral Info   Post Acute Recommendation Other   Referral Type nh   Facility Name [facility]     "

## 2017-09-25 NOTE — PROGRESS NOTES
Discharge orders sent to [facility] where pt is a resident. Will notify JEFF Gonzales floor nurse when to call report and when transport will  once pt accepted back.

## 2017-09-25 NOTE — PLAN OF CARE
Problem: Patient Care Overview  Goal: Plan of Care Review  Outcome: Ongoing (interventions implemented as appropriate)  Received pt on 2L NC; no distress noted

## 2017-09-25 NOTE — PROGRESS NOTES
[facility] Hospital Medicine  Progress Note    Patient Name: Jazmín Bishop  MRN: [id number]  Patient Class: IP- Inpatient   Admission Date: 9/21/2017  Length of Stay: 4 days  Attending Physician: Frank Noel MD  Primary Care Provider: [facility]        Subjective:     Principal Problem:Gram negative septic shock    HPI:  Jazmín Bishop is a 82 y.o. white woman with hypertension, chronic systolic congestive heart failure, chronic atrial fibrillation (anticoagulated on warfarin), gastroesophageal reflux disease, osteoarthritis, history of pulmonary embolism, choledocholithiasis that was unable to be treated endoscopically (instead treated with ursodiol), vitamin B12 and iron deficiency anemia.  She lives at [facility] in [place], [place].  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  She never  and does not have any children.  Her primary care physician is Dr. Luis Sparrow.   She had an ERCP with sphincterotomy done at [facility] on 5/16/16 by Dr. 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  Biliary stones could not be removed due to the sharp angulation of the bile duct, which inhibited withdrawal of a balloon tipped catheter.  She was put on ursodiol and the plan was to repeat ERCP only if she had increasing cholestasis or cholangitis.   She returned to [facility] ED on 9/21/17 with 1 week of constant aching generalized abdominal pain, worst in the left upper quadrant and right lower quadrant, accompanied by fever, nausea, and vomiting.  She had coffee ground emesis noted.  She had recently been diagnosed with moderate ileus on 9/13/17 and was treated with bisacodyl and an enema.  She was found to have colitis, biliary pancreatitis, cholestasis, and sepsis (temperature 103.6 F, total bilirubin 2.7, , ,  alkaline phosphatase 403, lipase >1000, abdominal CT showing colitis and worsening intrahepatic and extrahepatic biliary dilatation.  She was given cefepime, vancomycin, acetaminophen, morphine, fentanyl, and 2.5 liters of normal saline.  She was admitted to [facility] with a Gastroenterology consult.    Hospital Course:  While in the ED her blood pressure decreased to 60s/30s requiring placement of a right internal jugular triple lumen central venous catheter and initiation of norepinephrine infusion.  Cefepime and ursodiol were continued as she awaited Gastroenterology.  Blood culture grew Gram negative rods.  Gastroenterology consulted Interventional Radiology to perform percutaneous transhepatic cholangiography and drain placement.  There were no stones but there was resistance at the ampulla suggesting a biliary stricture.  Sepsis and liver enzymes rapidly improved afterward.  She required furosemide after getting too much fluids, and diuresed well.  Blood culture grew E coli and biliary drain culture grew Enterococcus.  She was put on amoxicillin-clavulanate for another 7 days and discharged back to the [facility] on 9/25/17 with the drain in place.  She will follow up with Gastroenterology.    Interval History: Doing well.    Review of Systems   Constitutional: Negative for chills and fever.   Respiratory: Negative for cough and shortness of breath.      Objective:     Vital Signs (Most Recent):  Temp: 98 °F (36.7 °C) (09/25/17 0754)  Pulse: 90 (09/25/17 0754)  Resp: 17 (09/25/17 0754)  BP: (!) 141/64 (09/25/17 0754)  SpO2: 96 % (09/25/17 0441) Vital Signs (24h Range):  Temp:  [96.2 °F (35.7 °C)-98 °F (36.7 °C)] 98 °F (36.7 °C)  Pulse:  [58-90] 90  Resp:  [17-20] 17  SpO2:  [92 %-99 %] 96 %  BP: ()/(52-70) 141/64     Weight: 42.5 kg (93 lb 11.1 oz)  Body mass index is 19.58 kg/m².    Intake/Output Summary (Last 24 hours) at 09/25/17 0755  Last data filed at 09/25/17 0600   Gross per 24  hour   Intake              165 ml   Output             1910 ml   Net            -1745 ml      Physical Exam   Constitutional: She is oriented to person, place, and time. She appears well-developed. No distress.   Cardiovascular: Normal rate and regular rhythm.    Pulmonary/Chest: Effort normal. No respiratory distress.   Abdominal: Soft.   Drain in place   Neurological: She is alert and oriented to person, place, and time.   Psychiatric: She has a normal mood and affect.   Nursing note and vitals reviewed.      Significant Labs:   CBC:     Recent Labs  Lab 09/24/17 0452 09/25/17  0354   WBC 7.88 6.85   HGB 8.7* 8.5*   HCT 27.6* 27.0*   * 142*     CMP:     Recent Labs  Lab 09/24/17 0452 09/25/17 0354    139   K 3.1* 4.0    102   CO2 30* 31*   GLU 86 89   BUN 11 14   CREATININE 0.6 0.7   CALCIUM 8.1* 8.3*   PROT 5.1* 5.3*   ALBUMIN 2.2* 2.2*   BILITOT 1.6* 1.1*   ALKPHOS 225* 204*   AST 23 15   ALT 43 34   ANIONGAP 6* 6*   EGFRNONAA >60 >60     All pertinent labs within the past 24 hours have been reviewed.    Significant Imaging: I have reviewed all pertinent imaging results/findings within the past 24 hours.     Assessment/Plan:      E coli bacteremia    Biliary stricture  Continue oral amoxicillin-clavulanate, and give Lactobacillus.  Drain placed.  Since Dr. Geo Ruano no longer working here, will have to follow up with a different gastroenterologist.          DNR (do not resuscitate)    Patient is DNR at nursing home and confirms that she would like to remain DNR while hospitalized.          History of pulmonary embolism              Nursing home resident    Resides at [facility] ([address] [facility], [place] [address])           Iron deficiency anemia secondary to inadequate dietary iron intake    Chronic, stable.  Daily CBC.   Holding iron supplement while NPO.  Continue to monitor.          Persistent atrial fibrillation    Current use of long term  anticoagulation  Supratherapeutic INR  Takes warfarin 5 mg daily.  Resume on discharge today.        Chronic diastolic heart failure    Biatrial enlargement  Continue carvedilol, lisinopril, and furosemide.  Give potassium and magnesium for hypokalemia and hypomagnesemia.          OA (osteoarthritis)    PRN pain medication.          Essential hypertension    Continue carvedilol 25 mg BID and lisinopril 40 mg nightly.            VTE Risk Mitigation         Ordered     High Risk of VTE  Once      09/22/17 0039     Place RONNI hose  Until discontinued      09/22/17 0039     Place sequential compression device  Until discontinued      09/22/17 0039     Reason for No Pharmacological VTE Prophylaxis  Once      09/22/17 0039        Disposition: Back to [facility] home today.      Frank Noel MD  Department of Hospital Medicine   [facility]

## 2017-09-26 ENCOUNTER — TELEPHONE (OUTPATIENT)
Dept: GASTROENTEROLOGY | Facility: CLINIC | Age: 82
End: 2017-09-26

## 2017-09-26 LAB — DAT IGG-SP REAG RBC-IMP: NORMAL

## 2017-09-26 NOTE — TELEPHONE ENCOUNTER
----- Message from Negrita Navarro sent at 9/25/2017 11:42 AM CDT -----  Contact: [phone] Room #117B [facility]   Patient would like to be seen sooner for 10 Day hospital follow up . Please advise.

## 2017-09-27 ENCOUNTER — OUTSIDE PLACE OF SERVICE (OUTPATIENT)
Dept: ADMINISTRATIVE | Facility: OTHER | Age: 82
End: 2017-09-27
Payer: MEDICARE

## 2017-09-27 LAB
BACTERIA BLD CULT: NORMAL
BACTERIA BLD CULT: NORMAL

## 2017-09-27 PROCEDURE — 99307 SBSQ NF CARE SF MDM 10: CPT | Mod: ,,, | Performed by: FAMILY MEDICINE

## 2017-09-28 LAB — BACTERIA SPEC ANAEROBE CULT: NORMAL

## 2017-10-05 ENCOUNTER — OUTSIDE PLACE OF SERVICE (OUTPATIENT)
Dept: ADMINISTRATIVE | Facility: OTHER | Age: 82
End: 2017-10-05
Payer: MEDICARE

## 2017-10-05 PROCEDURE — 99308 SBSQ NF CARE LOW MDM 20: CPT | Mod: ,,, | Performed by: FAMILY MEDICINE

## 2017-10-19 ENCOUNTER — OFFICE VISIT (OUTPATIENT)
Dept: GASTROENTEROLOGY | Facility: CLINIC | Age: 82
End: 2017-10-19
Payer: MEDICARE

## 2017-10-19 VITALS — DIASTOLIC BLOOD PRESSURE: 56 MMHG | SYSTOLIC BLOOD PRESSURE: 81 MMHG | HEART RATE: 80 BPM

## 2017-10-19 DIAGNOSIS — K80.50 CHOLEDOCHOLITHIASIS: ICD-10-CM

## 2017-10-19 DIAGNOSIS — K83.1 BILIARY STRICTURE: ICD-10-CM

## 2017-10-19 DIAGNOSIS — Z98.0 HISTORY OF BILLROTH II OPERATION: Primary | ICD-10-CM

## 2017-10-19 PROCEDURE — 99999 PR PBB SHADOW E&M-EST. PATIENT-LVL II: CPT | Mod: PBBFAC,,, | Performed by: INTERNAL MEDICINE

## 2017-10-19 PROCEDURE — 99215 OFFICE O/P EST HI 40 MIN: CPT | Mod: S$PBB,,, | Performed by: INTERNAL MEDICINE

## 2017-10-19 PROCEDURE — 99212 OFFICE O/P EST SF 10 MIN: CPT | Mod: PBBFAC,PN | Performed by: INTERNAL MEDICINE

## 2017-10-19 NOTE — PROGRESS NOTES
Subjective:       Patient ID: Jazmín Bishop is a 82 y.o. female.    Chief Complaint: Nausea    This is an 82-year-old female known to me from recent hospitalization who presents for a follow-up visit.  She was admitted with cholangitis and elevated LFTs and underwent IR drainage of the biliary system.  She was noted have gram-negative sepsis at the time as well.  Clinically she is doing much better at this time and has excellent output from her biliary drain.  She does note nausea predominantly in the morning times.  Some fatigue as well.  No fevers, chills, dark urine.  No vomiting today.  No other exacerbating or relieving factors or other associate symptoms.  It is moderate in intensity.  No melena or hematochezia.  She remains on Coumadin.    The following portions of the patient's history were reviewed and updated as appropriate: allergies, current medications, past family history, past medical history, past social history, past surgical history and problem list.    (Portions of this note were dictated using voice recognition software and may contain dictation related errors in spelling/grammar/syntax not found on text review)    HPI  Review of Systems   Constitutional: Positive for activity change. Negative for appetite change, chills and fever.   HENT: Negative for postnasal drip and trouble swallowing.    Eyes: Negative for pain and redness.   Respiratory: Negative for chest tightness and shortness of breath.    Cardiovascular: Negative for chest pain and leg swelling.   Gastrointestinal: Positive for abdominal pain, nausea and vomiting. Negative for abdominal distention, anal bleeding, blood in stool, constipation, diarrhea and rectal pain.   Endocrine: Negative for cold intolerance and heat intolerance.   Genitourinary: Negative for difficulty urinating and hematuria.   Musculoskeletal: Positive for arthralgias and back pain.   Skin: Negative for color change and pallor.   Allergic/Immunologic:  Negative for environmental allergies and food allergies.   Neurological: Negative for dizziness and light-headedness.   Hematological: Negative for adenopathy. Does not bruise/bleed easily.   Psychiatric/Behavioral: Negative for agitation and behavioral problems.       Objective:      Physical Exam   Constitutional: She is oriented to person, place, and time. She appears well-developed and well-nourished. No distress.   HENT:   Head: Normocephalic and atraumatic.   Nose: Nose normal.   Eyes: Conjunctivae and EOM are normal. No scleral icterus.   Neck: Normal range of motion. Neck supple. No thyromegaly present.   Cardiovascular: Normal rate and normal heart sounds.  Exam reveals no gallop and no friction rub.    irregular   Pulmonary/Chest: Effort normal and breath sounds normal. No respiratory distress. She has no wheezes.   Abdominal: Soft. Bowel sounds are normal. She exhibits no distension. There is no tenderness.   +drain, bilious outpt, some sludge   Musculoskeletal: Normal range of motion. She exhibits no edema.   Neurological: She is alert and oriented to person, place, and time.   Skin: Skin is warm and dry. No rash noted. She is not diaphoretic. No erythema.   Psychiatric: She has a normal mood and affect. Her behavior is normal.   Nursing note and vitals reviewed.      Labs/imaging reviewed, tb 0.7  Assessment:       1. History of Billroth II operation    2. Biliary stricture    3. Choledocholithiasis        Plan:   1. ERCP, drain removal   - risks/benefits explained: risk factors, heart failure, advanced age, anti-coagulation, a.fib  2. Will need coumadin held

## 2017-10-19 NOTE — PATIENT INSTRUCTIONS
ERCP scheduled for November 13 with Dr. Nuñez.  NPO after midnight. Will discuss coumadin with coumadin clinic provider at the living facility.

## 2017-10-24 LAB — FUNGUS SPEC CULT: NORMAL

## 2017-10-28 ENCOUNTER — HOSPITAL ENCOUNTER (INPATIENT)
Facility: HOSPITAL | Age: 82
LOS: 2 days | Discharge: HOSPICE | DRG: 871 | End: 2017-10-30
Attending: EMERGENCY MEDICINE | Admitting: HOSPITALIST
Payer: MEDICARE

## 2017-10-28 DIAGNOSIS — A41.9 SEPTIC SHOCK: ICD-10-CM

## 2017-10-28 DIAGNOSIS — N17.9 ACUTE RENAL FAILURE, UNSPECIFIED ACUTE RENAL FAILURE TYPE: Primary | ICD-10-CM

## 2017-10-28 DIAGNOSIS — E87.5 HYPERKALEMIA: ICD-10-CM

## 2017-10-28 DIAGNOSIS — R65.21 SEPTIC SHOCK: ICD-10-CM

## 2017-10-28 DIAGNOSIS — I95.9 HYPOTENSION: ICD-10-CM

## 2017-10-28 PROBLEM — N19 UREMIA DUE TO INADEQUATE RENAL PERFUSION: Status: ACTIVE | Noted: 2017-10-28

## 2017-10-28 PROBLEM — Z51.5 COMFORT MEASURES ONLY STATUS: Status: ACTIVE | Noted: 2017-10-28

## 2017-10-28 PROBLEM — D72.829 LEUKOCYTOSIS: Status: ACTIVE | Noted: 2017-10-28

## 2017-10-28 PROBLEM — E87.20 METABOLIC ACIDOSIS WITH NORMAL ANION GAP AND BICARBONATE LOSSES: Status: ACTIVE | Noted: 2017-10-28

## 2017-10-28 PROBLEM — D61.818 PANCYTOPENIA: Status: RESOLVED | Noted: 2017-02-06 | Resolved: 2017-10-28

## 2017-10-28 LAB
ALBUMIN SERPL BCP-MCNC: 2.1 G/DL
ALLENS TEST: ABNORMAL
ALP SERPL-CCNC: 139 U/L
ALT SERPL W/O P-5'-P-CCNC: 9 U/L
ANION GAP SERPL CALC-SCNC: 11 MMOL/L
ANISOCYTOSIS BLD QL SMEAR: SLIGHT
APTT BLDCRRT: 56.7 SEC
AST SERPL-CCNC: 15 U/L
BASOPHILS # BLD AUTO: ABNORMAL K/UL
BASOPHILS NFR BLD: 0 %
BILIRUB SERPL-MCNC: 0.7 MG/DL
BILIRUB UR QL STRIP: ABNORMAL
BNP SERPL-MCNC: 397 PG/ML
BUN SERPL-MCNC: 134 MG/DL
CALCIUM SERPL-MCNC: 8.1 MG/DL
CHLORIDE SERPL-SCNC: 107 MMOL/L
CLARITY UR: ABNORMAL
CO2 SERPL-SCNC: 13 MMOL/L
COLOR UR: ABNORMAL
CORTIS SERPL-MCNC: 16.9 UG/DL
CREAT SERPL-MCNC: 7.8 MG/DL
DELSYS: ABNORMAL
DIFFERENTIAL METHOD: ABNORMAL
EOSINOPHIL # BLD AUTO: ABNORMAL K/UL
EOSINOPHIL NFR BLD: 0 %
ERYTHROCYTE [DISTWIDTH] IN BLOOD BY AUTOMATED COUNT: 14.9 %
EST. GFR  (AFRICAN AMERICAN): 5 ML/MIN/1.73 M^2
EST. GFR  (NON AFRICAN AMERICAN): 4 ML/MIN/1.73 M^2
GLUCOSE SERPL-MCNC: 186 MG/DL
GLUCOSE UR QL STRIP: NEGATIVE
HCO3 UR-SCNC: 9.7 MMOL/L (ref 24–28)
HCT VFR BLD AUTO: 34.7 %
HCT VFR BLD CALC: 43 %PCV (ref 36–54)
HGB BLD-MCNC: 11.1 G/DL
HGB BLD-MCNC: 15 G/DL
HGB UR QL STRIP: NEGATIVE
INR PPP: 2
KETONES UR QL STRIP: ABNORMAL
LACTATE SERPL-SCNC: 0.8 MMOL/L
LACTATE SERPL-SCNC: 1.9 MMOL/L
LEUKOCYTE ESTERASE UR QL STRIP: NEGATIVE
LIPASE SERPL-CCNC: 9 U/L
LYMPHOCYTES # BLD AUTO: ABNORMAL K/UL
LYMPHOCYTES NFR BLD: 15 %
MAGNESIUM SERPL-MCNC: 1.6 MG/DL
MCH RBC QN AUTO: 29.1 PG
MCHC RBC AUTO-ENTMCNC: 32 G/DL
MCV RBC AUTO: 91 FL
MONOCYTES # BLD AUTO: ABNORMAL K/UL
MONOCYTES NFR BLD: 9 %
NEUTROPHILS NFR BLD: 71 %
NEUTS BAND NFR BLD MANUAL: 5 %
NITRITE UR QL STRIP: NEGATIVE
OVALOCYTES BLD QL SMEAR: ABNORMAL
PCO2 BLDA: 30 MMHG (ref 35–45)
PH SMN: 7.12 [PH] (ref 7.35–7.45)
PH UR STRIP: 6 [PH] (ref 5–8)
PHOSPHATE SERPL-MCNC: 5.3 MG/DL
PLATELET # BLD AUTO: 259 K/UL
PLATELET BLD QL SMEAR: ABNORMAL
PMV BLD AUTO: 10.7 FL
PO2 BLDA: 41 MMHG (ref 40–60)
POC BE: -20 MMOL/L
POC IONIZED CALCIUM: 1.19 MMOL/L (ref 1.06–1.42)
POC SATURATED O2: 61 % (ref 95–100)
POC TCO2: 11 MMOL/L (ref 24–29)
POCT GLUCOSE: 380 MG/DL (ref 70–110)
POIKILOCYTOSIS BLD QL SMEAR: SLIGHT
POTASSIUM BLD-SCNC: 7.4 MMOL/L (ref 3.5–5.1)
POTASSIUM SERPL-SCNC: 5.7 MMOL/L
PROCALCITONIN SERPL IA-MCNC: 3.01 NG/ML
PROT SERPL-MCNC: 4.8 G/DL
PROT UR QL STRIP: ABNORMAL
PROTHROMBIN TIME: 20.5 SEC
RBC # BLD AUTO: 3.82 M/UL
SAMPLE: ABNORMAL
SITE: ABNORMAL
SODIUM BLD-SCNC: 128 MMOL/L (ref 136–145)
SODIUM SERPL-SCNC: 131 MMOL/L
SP GR UR STRIP: >=1.03 (ref 1–1.03)
T4 FREE SERPL-MCNC: 0.99 NG/DL
TROPONIN I SERPL DL<=0.01 NG/ML-MCNC: 0.07 NG/ML
TSH SERPL DL<=0.005 MIU/L-ACNC: 6.33 UIU/ML
URN SPEC COLLECT METH UR: ABNORMAL
UROBILINOGEN UR STRIP-ACNC: NEGATIVE EU/DL
WBC # BLD AUTO: 16.27 K/UL

## 2017-10-28 PROCEDURE — 83690 ASSAY OF LIPASE: CPT

## 2017-10-28 PROCEDURE — 99291 CRITICAL CARE FIRST HOUR: CPT | Mod: 25

## 2017-10-28 PROCEDURE — 93010 ELECTROCARDIOGRAM REPORT: CPT | Mod: ,,, | Performed by: INTERNAL MEDICINE

## 2017-10-28 PROCEDURE — 84145 PROCALCITONIN (PCT): CPT

## 2017-10-28 PROCEDURE — 84484 ASSAY OF TROPONIN QUANT: CPT

## 2017-10-28 PROCEDURE — 85610 PROTHROMBIN TIME: CPT

## 2017-10-28 PROCEDURE — 11000001 HC ACUTE MED/SURG PRIVATE ROOM

## 2017-10-28 PROCEDURE — 80053 COMPREHEN METABOLIC PANEL: CPT

## 2017-10-28 PROCEDURE — 87086 URINE CULTURE/COLONY COUNT: CPT

## 2017-10-28 PROCEDURE — 25000003 PHARM REV CODE 250

## 2017-10-28 PROCEDURE — 51702 INSERT TEMP BLADDER CATH: CPT

## 2017-10-28 PROCEDURE — 83735 ASSAY OF MAGNESIUM: CPT

## 2017-10-28 PROCEDURE — 85730 THROMBOPLASTIN TIME PARTIAL: CPT

## 2017-10-28 PROCEDURE — 85027 COMPLETE CBC AUTOMATED: CPT

## 2017-10-28 PROCEDURE — 25000242 PHARM REV CODE 250 ALT 637 W/ HCPCS: Performed by: EMERGENCY MEDICINE

## 2017-10-28 PROCEDURE — 25000003 PHARM REV CODE 250: Performed by: PHYSICIAN ASSISTANT

## 2017-10-28 PROCEDURE — 94644 CONT INHLJ TX 1ST HOUR: CPT

## 2017-10-28 PROCEDURE — 82962 GLUCOSE BLOOD TEST: CPT

## 2017-10-28 PROCEDURE — 84132 ASSAY OF SERUM POTASSIUM: CPT

## 2017-10-28 PROCEDURE — 81003 URINALYSIS AUTO W/O SCOPE: CPT

## 2017-10-28 PROCEDURE — 84100 ASSAY OF PHOSPHORUS: CPT

## 2017-10-28 PROCEDURE — 84439 ASSAY OF FREE THYROXINE: CPT

## 2017-10-28 PROCEDURE — 83880 ASSAY OF NATRIURETIC PEPTIDE: CPT

## 2017-10-28 PROCEDURE — 82803 BLOOD GASES ANY COMBINATION: CPT

## 2017-10-28 PROCEDURE — 93005 ELECTROCARDIOGRAM TRACING: CPT

## 2017-10-28 PROCEDURE — 25000003 PHARM REV CODE 250: Performed by: EMERGENCY MEDICINE

## 2017-10-28 PROCEDURE — 63600175 PHARM REV CODE 636 W HCPCS: Performed by: EMERGENCY MEDICINE

## 2017-10-28 PROCEDURE — 84443 ASSAY THYROID STIM HORMONE: CPT

## 2017-10-28 PROCEDURE — 83605 ASSAY OF LACTIC ACID: CPT

## 2017-10-28 PROCEDURE — 82533 TOTAL CORTISOL: CPT

## 2017-10-28 PROCEDURE — 85007 BL SMEAR W/DIFF WBC COUNT: CPT

## 2017-10-28 PROCEDURE — 36556 INSERT NON-TUNNEL CV CATH: CPT

## 2017-10-28 PROCEDURE — 87040 BLOOD CULTURE FOR BACTERIA: CPT

## 2017-10-28 RX ORDER — SODIUM CHLORIDE 9 MG/ML
INJECTION, SOLUTION INTRAVENOUS CONTINUOUS
Status: DISCONTINUED | OUTPATIENT
Start: 2017-10-28 | End: 2017-10-30

## 2017-10-28 RX ORDER — ALBUTEROL SULFATE 0.83 MG/ML
10 SOLUTION RESPIRATORY (INHALATION)
Status: COMPLETED | OUTPATIENT
Start: 2017-10-28 | End: 2017-10-28

## 2017-10-28 RX ORDER — CARVEDILOL 3.12 MG/1
3.12 TABLET ORAL 2 TIMES DAILY WITH MEALS
Status: ON HOLD | COMMUNITY
End: 2017-10-29

## 2017-10-28 RX ORDER — INDOMETHACIN 25 MG/1
50 CAPSULE ORAL
Status: COMPLETED | OUTPATIENT
Start: 2017-10-28 | End: 2017-10-28

## 2017-10-28 RX ORDER — CEFEPIME HYDROCHLORIDE 2 G/50ML
2 INJECTION, SOLUTION INTRAVENOUS
Status: COMPLETED | OUTPATIENT
Start: 2017-10-28 | End: 2017-10-28

## 2017-10-28 RX ORDER — LIDOCAINE HYDROCHLORIDE 10 MG/ML
1 INJECTION, SOLUTION EPIDURAL; INFILTRATION; INTRACAUDAL; PERINEURAL ONCE
Status: COMPLETED | OUTPATIENT
Start: 2017-10-28 | End: 2017-10-28

## 2017-10-28 RX ORDER — ACETAMINOPHEN 325 MG/1
650 TABLET ORAL EVERY 4 HOURS PRN
Status: DISCONTINUED | OUTPATIENT
Start: 2017-10-28 | End: 2017-10-30 | Stop reason: HOSPADM

## 2017-10-28 RX ORDER — MORPHINE SULFATE 10 MG/ML
4 INJECTION INTRAMUSCULAR; INTRAVENOUS; SUBCUTANEOUS EVERY 4 HOURS PRN
Status: DISCONTINUED | OUTPATIENT
Start: 2017-10-28 | End: 2017-10-30 | Stop reason: HOSPADM

## 2017-10-28 RX ORDER — FAMOTIDINE 10 MG/ML
20 INJECTION INTRAVENOUS DAILY
Status: DISCONTINUED | OUTPATIENT
Start: 2017-10-29 | End: 2017-10-30 | Stop reason: HOSPADM

## 2017-10-28 RX ORDER — ONDANSETRON 2 MG/ML
4 INJECTION INTRAMUSCULAR; INTRAVENOUS EVERY 6 HOURS PRN
Status: DISCONTINUED | OUTPATIENT
Start: 2017-10-28 | End: 2017-10-30 | Stop reason: HOSPADM

## 2017-10-28 RX ORDER — MORPHINE SULFATE 10 MG/ML
2 INJECTION INTRAMUSCULAR; INTRAVENOUS; SUBCUTANEOUS EVERY 4 HOURS PRN
Status: DISCONTINUED | OUTPATIENT
Start: 2017-10-28 | End: 2017-10-30 | Stop reason: HOSPADM

## 2017-10-28 RX ORDER — LIDOCAINE HYDROCHLORIDE 10 MG/ML
INJECTION INFILTRATION; PERINEURAL
Status: DISCONTINUED
Start: 2017-10-28 | End: 2017-10-28 | Stop reason: WASHOUT

## 2017-10-28 RX ORDER — LIDOCAINE HYDROCHLORIDE 10 MG/ML
INJECTION INFILTRATION; PERINEURAL
Status: COMPLETED
Start: 2017-10-28 | End: 2017-10-28

## 2017-10-28 RX ORDER — LORAZEPAM 2 MG/ML
1 CONCENTRATE ORAL EVERY 4 HOURS PRN
Status: DISCONTINUED | OUTPATIENT
Start: 2017-10-28 | End: 2017-10-30 | Stop reason: HOSPADM

## 2017-10-28 RX ORDER — METOCLOPRAMIDE HYDROCHLORIDE 5 MG/ML
5 INJECTION INTRAMUSCULAR; INTRAVENOUS EVERY 6 HOURS PRN
Status: DISCONTINUED | OUTPATIENT
Start: 2017-10-28 | End: 2017-10-30 | Stop reason: HOSPADM

## 2017-10-28 RX ORDER — CEFEPIME HYDROCHLORIDE 1 G/50ML
1 INJECTION, SOLUTION INTRAVENOUS DAILY
Status: DISCONTINUED | OUTPATIENT
Start: 2017-10-29 | End: 2017-10-30 | Stop reason: HOSPADM

## 2017-10-28 RX ORDER — DEXTROSE 50 % IN WATER (D50W) INTRAVENOUS SYRINGE
25
Status: COMPLETED | OUTPATIENT
Start: 2017-10-28 | End: 2017-10-28

## 2017-10-28 RX ORDER — HYDROCODONE BITARTRATE AND ACETAMINOPHEN 5; 325 MG/1; MG/1
1 TABLET ORAL EVERY 6 HOURS PRN
COMMUNITY

## 2017-10-28 RX ORDER — IPRATROPIUM BROMIDE AND ALBUTEROL SULFATE 2.5; .5 MG/3ML; MG/3ML
3 SOLUTION RESPIRATORY (INHALATION) EVERY 4 HOURS PRN
Status: DISCONTINUED | OUTPATIENT
Start: 2017-10-28 | End: 2017-10-30 | Stop reason: HOSPADM

## 2017-10-28 RX ORDER — FENTANYL CITRATE 50 UG/ML
50 INJECTION, SOLUTION INTRAMUSCULAR; INTRAVENOUS
Status: COMPLETED | OUTPATIENT
Start: 2017-10-28 | End: 2017-10-28

## 2017-10-28 RX ADMIN — LIDOCAINE HYDROCHLORIDE 200 MG: 10 INJECTION, SOLUTION INFILTRATION; PERINEURAL at 11:10

## 2017-10-28 RX ADMIN — CEFEPIME HYDROCHLORIDE 2 G: 2 INJECTION, SOLUTION INTRAVENOUS at 12:10

## 2017-10-28 RX ADMIN — ALBUTEROL SULFATE 10 MG: 2.5 SOLUTION RESPIRATORY (INHALATION) at 12:10

## 2017-10-28 RX ADMIN — LIDOCAINE HYDROCHLORIDE 10 MG: 10 INJECTION, SOLUTION EPIDURAL; INFILTRATION; INTRACAUDAL; PERINEURAL at 11:10

## 2017-10-28 RX ADMIN — SODIUM BICARBONATE 50 MEQ: 84 INJECTION, SOLUTION INTRAVENOUS at 12:10

## 2017-10-28 RX ADMIN — DEXTROSE MONOHYDRATE 25 G: 25 INJECTION, SOLUTION INTRAVENOUS at 12:10

## 2017-10-28 RX ADMIN — INSULIN HUMAN 10 UNITS: 100 INJECTION, SOLUTION PARENTERAL at 12:10

## 2017-10-28 RX ADMIN — FENTANYL CITRATE 50 MCG: 50 INJECTION INTRAMUSCULAR; INTRAVENOUS at 12:10

## 2017-10-28 RX ADMIN — SODIUM CHLORIDE: 0.9 INJECTION, SOLUTION INTRAVENOUS at 07:10

## 2017-10-28 RX ADMIN — SODIUM CHLORIDE 1266 ML: 0.9 INJECTION, SOLUTION INTRAVENOUS at 11:10

## 2017-10-28 RX ADMIN — VANCOMYCIN HYDROCHLORIDE 1000 MG: 1 INJECTION, POWDER, LYOPHILIZED, FOR SOLUTION INTRAVENOUS at 01:10

## 2017-10-28 RX ADMIN — CALCIUM GLUCONATE 2 G: 94 INJECTION, SOLUTION INTRAVENOUS at 12:10

## 2017-10-28 NOTE — ASSESSMENT & PLAN NOTE
C/O mild abdominal pain but no guarding on exam. Recent admission for upper GI bleeding, cholangitis and biliary stricture.  No elevation in LFTs. On daily PPI, Carafate BID and Actigall BID at NH which will be held here for now.

## 2017-10-28 NOTE — HPI
82 y.o. Female with hypertension, chronic systolic congestive heart failure, chronic atrial fibrillation (anticoagulated on warfarin), gastroesophageal reflux disease, osteoarthritis, history of pulmonary embolism, choledocholithiasis that was unable to be treated endoscopically (instead treated with ursodiol), vitamin B12 and iron deficiency anemia.  She lives at [facility] in [place], [place].  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  She never  and does not have any children.  Her primary care physician is Dr. Luis Sparrow.                She had an ERCP with sphincterotomy done at [facility] on 5/16/16 by Dr. 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  Biliary stones could not be removed due to the sharp angulation of the bile duct, which inhibited withdrawal of a balloon tipped catheter.  She was put on ursodiol and the plan was to repeat ERCP only if she had increasing cholestasis or cholangitis.                She returned to [facility] ED on 9/21/17 with 1 week of constant aching generalized abdominal pain, worst in the left upper quadrant and right lower quadrant, accompanied by fever, nausea, and vomiting.  She had coffee ground emesis noted.  She had recently been diagnosed with moderate ileus on 9/13/17 and was treated with bisacodyl and an enema.  She was found to have colitis, biliary pancreatitis, cholestasis, and sepsis (temperature 103.6 F, total bilirubin 2.7, , , alkaline phosphatase 403, lipase >1000, abdominal CT showing colitis and worsening intrahepatic and extrahepatic biliary dilatation.  She was given cefepime, vancomycin, acetaminophen, morphine, fentanyl, and 2.5 liters of normal saline.  She was admitted to [facility] with a Gastroenterology consult.     While in the ED on 9/21/2017, her blood pressure  "decreased to 60s/30s requiring placement of a right internal jugular triple lumen central venous catheter and initiation of norepinephrine infusion.  Cefepime and ursodiol were continued as she awaited Gastroenterology.  Blood culture grew Gram negative rods.  Gastroenterology consulted Interventional Radiology to perform percutaneous transhepatic cholangiography and drain placement.  There were no stones but there was resistance at the ampulla suggesting a biliary stricture.  Sepsis and liver enzymes rapidly improved afterward.  She required furosemide after getting too much fluids, and diuresed well.  Blood culture grew E coli and biliary drain culture grew Enterococcus.  She was put on amoxicillin-clavulanate for another 7 days and discharged back to the [facility] on 9/25/17 with the drain in place.  She followed up with Gastroenterology on 10/19 and plans were made for repeat ERCP and drain removal on 11/13/2017.      Pt presents to [facility] on 10/28/2017 from nursing home who sent her here because she has not been eating or drinking for a few days and they are concerned about her INR level which was elevated at 3.93 on 10/16/2017.  They were unable to collect any blood from her as she is a difficult stick and is now dehydrated.  Work-up in ED: Severely hypotensive with SBP in 60s. Elevated , Cr 7.8, Potassium 7.4 shifted down to 5.7 by ED MD.   Pt has elevated WBC 16K, venous blood gas pH 7.1 with decreased pCO2 (and plasma CO2) and decreased HCO3.  There is no elevation in lactic acid but procalcitonin is elevated at 3; troponin slightly elevated 0.065 and  which is lower than previous hospitalizations.      Admitting to [facility] for initiation of Hospice with comfort care measures here. Pt confirms that she does NOT want to go to ICU for medication to increase her blood pressure and she does NOT want dialysis.  She states "just keep me comfortable".  Dr. Ordonez, ED MD, " spoke to MELISA West, pt's POA, and confirmed her DNR status and desire for comfort care only.

## 2017-10-28 NOTE — ASSESSMENT & PLAN NOTE
Uremia, acute hyperkalemia  , Cr 7.8 and K (after shifting in ED) 5.7.  Baseline creatinine ~1.0 with last baseline reading in EPIC on 9/25/2017.  NH staff reports poor PO intake and pt receiving lisinopril, lasix, potassium including this morning.  Significant hypotension likely contributed to renal failure too.  No evidence of infection on urinalysis.  Given IVF in ED which will be continued for comfort.  Pt states that she does NOT want dialysis.

## 2017-10-28 NOTE — ED PROVIDER NOTES
Encounter Date: 10/28/2017       History     Chief Complaint   Patient presents with    Hypotension     bradycardia, from [facility] they state she is not eating, drinking, they are unable to get lab work because she is a hard stick     The patient was transferred from a nursing home as she was not eating or drinking for the past few days.  The patient has no specific complaints but does complain of abdominal pain.  She was admitted to the hospital for sepsis secondary to biliary sepsis last month.  She still has a biliary drain.  She is hard of hearing, but otherwise alert and conversant.  She denies nausea or vomiting.      The history is provided by the patient and the nursing home.     Review of patient's allergies indicates:   Allergen Reactions    Codeine sulfate Other (See Comments)     CONFUSION       Past Medical History:   Diagnosis Date    A-fib     Arthritis     Cataract     Chicken pox     Choledocholithiasis 05/16/2016    Chronic diastolic congestive heart failure     E coli bacteremia 09/22/2017    due to biliary stricture and cholangitis    Hypertension     Ulcer      Past Surgical History:   Procedure Laterality Date    CHOLECYSTECTOMY      ERCP W/ SPHICTEROTOMY  05/16/2016    EYE SURGERY      HYSTERECTOMY      Pylorplasty      SMALL INTESTINE SURGERY      SPINE SURGERY      x2    STOMACH SURGERY       Family History   Problem Relation Age of Onset    Kidney disease Mother     No Known Problems Father      Social History   Substance Use Topics    Smoking status: Never Smoker    Smokeless tobacco: Never Used    Alcohol use No     Review of Systems   Unable to perform ROS: Acuity of condition   Gastrointestinal: Positive for abdominal pain. Negative for nausea.       Physical Exam     Initial Vitals   BP Pulse Resp Temp SpO2   10/28/17 1009 10/28/17 1010 -- 10/28/17 1013 10/28/17 1010   (!) 82/39 (!) 52  97.9 °F (36.6 °C) 100 %      MAP       10/28/17 1009       53.33  "        Physical Exam    Nursing note and vitals reviewed.  Constitutional: She appears well-developed. She is not diaphoretic.   Ill appearing, hypertensive   HENT:   Dry mucous membranes   Eyes: EOM are normal. Pupils are equal, round, and reactive to light. No scleral icterus.   Neck: Normal range of motion.   Cardiovascular: Regular rhythm, normal heart sounds and intact distal pulses.   The patient is bradycardic   Pulmonary/Chest: Breath sounds normal. No respiratory distress. She has no wheezes. She has no rhonchi. She has no rales. She exhibits no tenderness.   Abdominal: Soft. Bowel sounds are normal. She exhibits no distension. There is tenderness. There is no rebound and no guarding.   There is tenderness in the midepigastrium.   Neurological: She has normal strength. No cranial nerve deficit or sensory deficit.   She is alert and oriented.  No focal neurologic deficits.  She is conversant and follows commands.   Skin: Skin is warm and dry.         ED Course   Central Line  Date/Time: 10/28/2017 11:46 AM  Performed by: KARINA GUARDADO  Consent Done: Emergent Situation  Time out: Immediately prior to procedure a "time out" was called to verify the correct patient, procedure, equipment, support staff and site/side marked as required.  Indications: vascular access  Anesthesia method: Superficial cervical plexus block.  10 MLs 1% lidocaine.  Preparation: skin prepped with ChloraPrep  Skin prep agent dried: skin prep agent completely dried prior to procedure  Sterile barriers: all five maximum sterile barriers used - cap, mask, sterile gown, sterile gloves, and large sterile sheet  Hand hygiene: hand hygiene performed prior to central venous catheter insertion  Location details: right internal jugular  Catheter type: triple lumen  Catheter size: 7 Fr  Catheter Length: 12cm    Ultrasound guidance: yes  Vessel Caliber: large, compressibility normal  Needle advanced into vessel with real time Ultrasound " guidance.  Guidewire confirmed in vessel.  Sterile sheath used.  Number of attempts: 1  Assessment: placement verified by x-ray  Complications: none  Post-procedure: line sutured,  chlorhexidine patch,  sterile dressing applied and blood return through all ports        Labs Reviewed   APTT - Abnormal; Notable for the following:        Result Value    aPTT 56.7 (*)     All other components within normal limits   B-TYPE NATRIURETIC PEPTIDE - Abnormal; Notable for the following:      (*)     All other components within normal limits   CBC W/ AUTO DIFFERENTIAL - Abnormal; Notable for the following:     WBC 16.27 (*)     RBC 3.82 (*)     Hemoglobin 11.1 (*)     Hematocrit 34.7 (*)     RDW 14.9 (*)     Lymph% 15.0 (*)     All other components within normal limits   COMPREHENSIVE METABOLIC PANEL - Abnormal; Notable for the following:     Sodium 131 (*)     Potassium 5.7 (*)     CO2 13 (*)     Glucose 186 (*)     BUN, Bld 134 (*)     Creatinine 7.8 (*)     Calcium 8.1 (*)     Total Protein 4.8 (*)     Albumin 2.1 (*)     Alkaline Phosphatase 139 (*)     ALT 9 (*)     eGFR if  5 (*)     eGFR if non  4 (*)     All other components within normal limits   PHOSPHORUS - Abnormal; Notable for the following:     Phosphorus 5.3 (*)     All other components within normal limits   PROTIME-INR - Abnormal; Notable for the following:     Prothrombin Time 20.5 (*)     INR 2.0 (*)     All other components within normal limits   PROCALCITONIN - Abnormal; Notable for the following:     Procalcitonin 3.01 (*)     All other components within normal limits   TROPONIN I - Abnormal; Notable for the following:     Troponin I 0.065 (*)     All other components within normal limits   TSH - Abnormal; Notable for the following:     TSH 6.326 (*)     All other components within normal limits   ISTAT PROCEDURE - Abnormal; Notable for the following:     POC PH 7.116 (*)     POC PCO2 30.0 (*)     POC HCO3 9.7 (*)      POC SATURATED O2 61 (*)     POC Sodium 128 (*)     POC Potassium 7.4 (*)     POC TCO2 11 (*)     All other components within normal limits   POCT GLUCOSE - Abnormal; Notable for the following:     POCT Glucose 380 (*)     All other components within normal limits   CULTURE, BLOOD   CULTURE, BLOOD   CULTURE, URINE   LACTIC ACID, PLASMA   LIPASE   MAGNESIUM   T4, FREE   CORTISOL, RANDOM   LACTIC ACID, PLASMA   URINALYSIS   POCT GLUCOSE MONITORING CONTINUOUS          X-Rays:   Independently Interpreted Readings:   Chest X-Ray: No acute abnormalities.     Medical Decision Making:   History:   Old Medical Records: I decided to obtain old medical records.  Initial Assessment:   My differential includes septic shock, cardiogenic shock, renal failure, hyperkalemia.              Attending Attestation:         Attending Critical Care:   Critical Care Times:   ==============================================================  · Total Critical Care Time - exclusive of procedural time: 55 minutes.  ==============================================================      Attending ED Notes:   10:37 AM   I performed a limited bedside echocardiogram on the patient.  There is no pericardial effusion.  There was no severe depressed cardiac function.  The IVC was completely collapsible.    12:20 PM   I placed a central line and labs were obtained.  VBG revealed a pH is 7.11.  The potassium was elevated at 7.4.  EKG is atrial fibrillation at 53 with a widened QRS.  I will treat the patient with calcium gluconate, sodium bicarbonate, albuterol, and insulin/D50.  The remainder of her labs are pending.  I will treat with broad-spectrum antibiotics.    3:20 PM   The patient is hypotensive again at 66/35.  She is less responsive.  The patient is a DO NOT RESUSCITATE.  I will attempt to contact family, as if she does not improve she would require pressors and will likely need dialysis.  Pressors may be temporizing, but she may not be a good  "candidate for dialysis given her comorbidities and her prior wishes of being a DO NOT RESUSCITATE.    3:43 PM   I had a lengthy discussion with her power of , MELISA West.  She has no living relatives and he is the power of  through the [employer].  He has known her for 2 years.  He understands the seriousness of her condition with renal failure and hypotension.  He does not think she would ever want hemodialysis.  He is not certain she would want any other aggressive treatments including pressors.  The patient is hypotensive, but also states that she did not want dialysis and just wanted to "go home.  "The medicine team evaluate the patient and was told the same thing.  In light of this, I think making the patient a full DO NOT RESUSCITATE without using pressors or offering hemodialysis is in her best interest.  I communicated this with the medicine team, and they will attempt to place the patient in hospice at her nursing home, oral admit the patient to the floor for hospice placement.            ED Course      Clinical Impression:   The primary encounter diagnosis was Acute renal failure, unspecified acute renal failure type. Diagnoses of Hypotension and Hyperkalemia were also pertinent to this visit.                           David Ordonez MD  10/28/17 1500       David Ordonez MD  10/28/17 [id number]    "

## 2017-10-28 NOTE — H&P
[facility]  History & Physical    Patient Name: Jazmín Bishop  MRN: [id number]  Admission Date: 10/28/2017  Attending Physician: Emory Yadav MD  Primary Care Provider: Luis Butcher MD         Patient information was obtained from patient, nursing home, past medical records and ER records.     Subjective:     Principal Problem:Septic shock    Chief Complaint:   Chief Complaint   Patient presents with    Hypotension     bradycardia, from [facility] they state she is not eating, drinking, they are unable to get lab work because she is a hard stick        HPI: 82 y.o. Female with hypertension, chronic systolic congestive heart failure, chronic atrial fibrillation (anticoagulated on warfarin), gastroesophageal reflux disease, osteoarthritis, history of pulmonary embolism, choledocholithiasis that was unable to be treated endoscopically (instead treated with ursodiol), vitamin B12 and iron deficiency anemia.  She lives at [facility] in [place], [place].  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  She never  and does not have any children.  Her primary care physician is Dr. Luis Sparrow.                She had an ERCP with sphincterotomy done at [facility] on 5/16/16 by Dr. 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  Biliary stones could not be removed due to the sharp angulation of the bile duct, which inhibited withdrawal of a balloon tipped catheter.  She was put on ursodiol and the plan was to repeat ERCP only if she had increasing cholestasis or cholangitis.                She returned to [facility] ED on 9/21/17 with 1 week of constant aching generalized abdominal pain, worst in the left upper quadrant and right lower quadrant, accompanied by fever, nausea, and vomiting.  She had coffee ground emesis  noted.  She had recently been diagnosed with moderate ileus on 9/13/17 and was treated with bisacodyl and an enema.  She was found to have colitis, biliary pancreatitis, cholestasis, and sepsis (temperature 103.6 F, total bilirubin 2.7, , , alkaline phosphatase 403, lipase >1000, abdominal CT showing colitis and worsening intrahepatic and extrahepatic biliary dilatation.  She was given cefepime, vancomycin, acetaminophen, morphine, fentanyl, and 2.5 liters of normal saline.  She was admitted to [facility] with a Gastroenterology consult.     While in the ED on 9/21/2017, her blood pressure decreased to 60s/30s requiring placement of a right internal jugular triple lumen central venous catheter and initiation of norepinephrine infusion.  Cefepime and ursodiol were continued as she awaited Gastroenterology.  Blood culture grew Gram negative rods.  Gastroenterology consulted Interventional Radiology to perform percutaneous transhepatic cholangiography and drain placement.  There were no stones but there was resistance at the ampulla suggesting a biliary stricture.  Sepsis and liver enzymes rapidly improved afterward.  She required furosemide after getting too much fluids, and diuresed well.  Blood culture grew E coli and biliary drain culture grew Enterococcus.  She was put on amoxicillin-clavulanate for another 7 days and [facility] with the drain in place.  She  followed up with Gastroenterology on 10/19 and plans were made for repeat ERCP and drain removal on 11/13/2017.      Pt [facility] to [facility] on 10/28/2017 from nursing home who sent her here because she has not been eating or drinking for a few days and they are concerned about her INR level which was elevated at 3.93 on 10/16/2017.  They were unable to collect any blood from her as she is a difficult stick and is now dehydrated.  Work-up in ED: Severely hypotensive with SBP in 60s. Elevated  ", Cr 7.8, Potassium 7.4 shifted down to 5.7 by ED MD.   Pt has elevated WBC 16K, venous blood gas pH 7.1 with decreased pCO2 (and plasma CO2) and decreased HCO3.  There is no elevation in lactic acid but procalcitonin is elevated at 3; troponin slightly elevated 0.065 and  which is lower than previous hospitalizations.      Admitting to [facility] for initiation of Hospice with comfort care measures here. Pt confirms that she does NOT want to go to ICU for medication to increase her blood pressure and she does NOT want dialysis.  She states "just keep me comfortable".  Dr. Ordonez, ED MD, spoke to MELISA West, pt's POA, and confirmed her DNR status and desire for comfort care only.      Past Medical History:   Diagnosis Date    A-fib     Arthritis     Cataract     Chicken pox     Choledocholithiasis 05/16/2016    Chronic diastolic congestive heart failure     E coli bacteremia 09/22/2017    due to biliary stricture and cholangitis    Hypertension     Pancytopenia 2/6/2017    Ulcer        Past Surgical History:   Procedure Laterality Date    CHOLECYSTECTOMY      ERCP W/ SPHICTEROTOMY  05/16/2016    EYE SURGERY      HYSTERECTOMY      Pylorplasty      SMALL INTESTINE SURGERY      SPINE SURGERY      x2    STOMACH SURGERY         Review of patient's allergies indicates:   Allergen Reactions    Codeine sulfate Other (See Comments)     CONFUSION         No current facility-administered medications on file prior to encounter.      Current Outpatient Prescriptions on File Prior to Encounter   Medication Sig    ascorbic acid, vitamin C, (VITAMIN C) 500 MG tablet Take 500 mg by mouth 3 (three) times daily.    aspirin (ECOTRIN) 81 MG EC tablet Take 81 mg by mouth once daily.    cyanocobalamin (VITAMIN B-12) 1000 MCG tablet Take 1 tablet (1,000 mcg total) by mouth once daily.    docusate sodium (COLACE) 100 MG capsule Take 100 mg by mouth once daily.    ferrous sulfate 325 (65 FE) " MG EC tablet Take 1 tablet (325 mg total) by mouth 3 (three) times daily with meals.    fluticasone (FLONASE) 50 mcg/actuation nasal spray 1 spray by Each Nare route once daily.    furosemide (LASIX) 40 MG tablet Take 1 tablet (40 mg total) by mouth once daily.    lisinopril (PRINIVIL,ZESTRIL) 20 MG tablet Take 2 tablets (40 mg total) by mouth nightly. (Patient taking differently: Take 20 mg by mouth nightly. )    loratadine (CLARITIN) 10 mg tablet Take 10 mg by mouth once daily.    ondansetron (ZOFRAN) 4 MG tablet Take 4 mg by mouth every 6 (six) hours as needed for Nausea.    pantoprazole (PROTONIX) 40 MG tablet Take 40 mg by mouth once daily.    potassium chloride SA (K-DUR,KLOR-CON) 10 MEQ tablet Take 10 mEq by mouth once daily.     promethazine (PHENERGAN) 25 MG tablet Take 1 tablet (25 mg total) by mouth daily as needed for Nausea.    sucralfate (CARAFATE) 1 gram tablet Take 1 g by mouth 2 (two) times daily.    tramadol (ULTRAM) 50 mg tablet Take 50 mg by mouth every 8 (eight) hours as needed for Pain.    ursodiol (ACTIGALL) 300 mg capsule Take 1 capsule (300 mg total) by mouth 2 (two) times daily.    acetaminophen (TYLENOL) 500 MG tablet Take 500 mg by mouth every 6 (six) hours as needed for Pain.    albuterol-ipratropium 2.5mg-0.5mg/3mL (DUO-NEB) 0.5 mg-3 mg(2.5 mg base)/3 mL nebulizer solution Take 3 mLs by nebulization every 6 (six) hours as needed for Wheezing. Rescue    aluminum & magnesium hydroxide-simethicone (MYLANTA MAX STRENGTH) 400-400-40 mg/5 mL suspension Take 20 mLs by mouth every 6 (six) hours as needed for Indigestion.    hydrOXYzine pamoate (VISTARIL) 25 MG Cap Take 1 capsule (25 mg total) by mouth nightly as needed.    hyoscyamine (ANASPAZ,LEVSIN) 0.125 mg Tab Take 125 mcg by mouth every 6 (six) hours as needed (abdominal pain).     loperamide (IMODIUM) 2 mg capsule Take 2 mg by mouth every 2 (two) hours as needed for Diarrhea.    methocarbamol (ROBAXIN) 500 MG Tab Take 1  tablet (500 mg total) by mouth 2 (two) times daily as needed.    simethicone (MYLICON) 80 MG chewable tablet Take 80 mg by mouth every 6 (six) hours as needed for Flatulence.    warfarin (COUMADIN) 3 MG tablet Take 1 tablet (3 mg total) by mouth Daily. (Patient taking differently: Take 5 mg by mouth Daily. )    [DISCONTINUED] carvedilol (COREG) 25 MG tablet Take 1 tablet (25 mg total) by mouth 2 (two) times daily with meals. (Patient not taking: Reported on 10/28/2017)     Family History     Problem Relation (Age of Onset)    Kidney disease Mother    No Known Problems Father        Social History Main Topics    Smoking status: Never Smoker    Smokeless tobacco: Never Used    Alcohol use No    Drug use: No    Sexual activity: No     Review of Systems   Unable to perform ROS: Acuity of condition     Objective:     Vital Signs (Most Recent):  Temp: 97.9 °F (36.6 °C) (10/28/17 1013)  Pulse: 106 (10/28/17 1618)  Resp: 18 (10/28/17 1618)  BP: (!) 88/46 (10/28/17 1618)  SpO2: 100 % (10/28/17 1618) Vital Signs (24h Range):  Temp:  [97.9 °F (36.6 °C)] 97.9 °F (36.6 °C)  Pulse:  [] 106  Resp:  [17-24] 18  SpO2:  [100 %] 100 %  BP: ()/(34-72) 88/46     Weight: 42.2 kg (93 lb)  Body mass index is 19.44 kg/m².    Physical Exam   Constitutional: She appears well-developed. No distress.   Cachectic with bitemporal wasting.    HENT:   Head: Normocephalic and atraumatic.   Mouth/Throat: No oropharyngeal exudate.   Dry mucous membranes   Eyes: Conjunctivae are normal. Pupils are equal, round, and reactive to light. No scleral icterus.   Neck: Neck supple. No JVD present. No thyromegaly present.   Right IJ catheter in place.    Cardiovascular:   Murmur heard.  Irregularly irregular rhythm    Pulmonary/Chest: Effort normal and breath sounds normal. No respiratory distress. She has no wheezes. She has no rales. She exhibits no tenderness.   Abdominal: Soft. Bowel sounds are normal. She exhibits no distension. There  is tenderness. There is no rebound and no guarding.   Biliary drain intact with no surrounding erythema. Moderate amount of brown liquid in collection bag.    Musculoskeletal: She exhibits tenderness. She exhibits no edema.   Neurological:   Somnolent. Oriented to person and that she is at hospital. Hard of hearing.     Skin: Skin is warm and dry. Capillary refill takes 2 to 3 seconds. No rash noted. She is not diaphoretic.   Psychiatric:   Pt is somnolent but awakens easily.    Nursing note and vitals reviewed.       Significant Labs:   ABGs:   Recent Labs  Lab 10/28/17  1216   PH 7.116*   PCO2 30.0*   HCO3 9.7*   POCSATURATED 61*   BE -20     CBC:   Recent Labs  Lab 10/28/17  1159 10/28/17  1216   WBC 16.27*  --    HGB 11.1*  --    HCT 34.7* 43     --      CMP:   Recent Labs  Lab 10/28/17  1159   *   K 5.7*      CO2 13*   *   *   CREATININE 7.8*   CALCIUM 8.1*   PROT 4.8*   ALBUMIN 2.1*   BILITOT 0.7   ALKPHOS 139*   AST 15   ALT 9*   ANIONGAP 11   EGFRNONAA 4*     Cardiac Markers:   Recent Labs  Lab 10/28/17  1159   *     Coagulation:   Recent Labs  Lab 10/28/17  1159   INR 2.0*   APTT 56.7*     Lactic Acid:   Recent Labs  Lab 10/28/17  1159 10/28/17  1444   LACTATE 0.8 1.9     Lipase:   Recent Labs  Lab 10/28/17  1159   LIPASE 9     TSH:   Recent Labs  Lab 10/28/17  1159   TSH 6.326*     Urine Studies:   Recent Labs  Lab 10/28/17  1440   COLORU [place]*   APPEARANCEUA Hazy*   PHUR 6.0   SPECGRAV >=1.030*   PROTEINUA Trace*   GLUCUA Negative   KETONESU Trace*   BILIRUBINUA 1+*   OCCULTUA Negative   NITRITE Negative   UROBILINOGEN Negative   LEUKOCYTESUR Negative       Significant Imaging:     Imaging Results          CT Abdomen Pelvis  Without Contrast (Final result)  Result time 10/28/17 14:47:47    Final result by Rei Pryor MD (10/28/17 14:47:47)                 Impression:        No acute CT abnormalities of the abdomen and pelvis on this noncontrast enhanced  exam.    Postoperative changes of prior bowel surgery which, according to the [facility] medical record, are due to a Billroth II procedure.    Percutaneous transhepatic biliary drainage catheter is in stable and appropriate position with its pigtail coiled in the duodenum.  No biliary tree dilation.  Status post cholecystectomy.    Multiple segments of small and large bowel demonstrating mild wall thickening.  Findings could suggest an enteritis/colitis.  Clinical correlation for the same recommended.      Electronically signed by: SUELLEN HUIZAR MD  Date:     10/28/17  Time:    14:47              Narrative:    Exam: CT abdomen pelvis without contrast    Technique: Axial imaging of the abdomen and pelvis performed without IV contrast.  Coronal and sagittal reformats.    Reason: Abdominal pain    Comparison: CTA abdomen 9/21/17    Findings:    Beam hardening artifact is present from the patient's arms draped over her body as well as numerous radiodense metallic monitoring leads.  There is abundant motion artifact.    Calcified granulomas are noted in the right lung base.  Lung bases are otherwise clear without pleural fluid.    Visualized portions of the heart are enlarged.  No abnormal on the pericardial fluid is seen.    Multiple surgical clips and a radiodense staple line are noted in the region of the GE junction, similar to prior exam.  Prior bowel surgery is suspected due to altered anatomy seen on this scan, difficult to delineate in the absence of p.o. contrast.  Review of the [facility] medical record indicates the patient has Bilroth II anatomy.    The aorta is non-aneurysmal, with calcific atherosclerotic plaque along its course and branch vessels.    The gallbladder has been removed.  Dense Beam hardening artifact from metallic surgical clips obscures a large portion of the hepatic parenchyma.  There is a external percutaneous biliary drain to in place with its distal tip coiling in the duodenum.  The biliary tree  does not appear dilated.  Hepatic parenchyma is homogenous.    The spleen, adrenal glands, and pancreas are unremarkable with noncontrast exam.  There is no evidence of bowel obstruction.  The numerous segments of small and large bowel demonstrate mild wall thickening.  Findings could suggest an enteritis/colitis.  Clinical correlation for the same recommended.  No abnormally enlarged lymph nodes are seen in the abdomen and pelvis.  No free air or free fluid is seen in the abdomen and pelvis.    Kidneys show no evidence of obstruction.  No evidence of nephrolithiasis.  Ill-defined subcentimeter focus of hyperattenuation is noted in the inferior pole the left kidney, similar to recent prior exam.  This finding is of questionable clinical significance.  Further evaluation with contrast-enhanced cross-sectional imaging on a non-emergent, outpatient basis could be obtained if indicated.  Urinary bladder is not well distended, limiting evaluation.  The uterus has been removed.  No abnormal adnexal masses.    The osseous structures demonstrate age-appropriate degenerative changes of the spine, similar to prior exam.  No new acute fracture, dislocation, or osseous destructive process.  Levoscoliosis of the lumbar spine is again noted.                             X-Ray Chest AP Portable (Final result)  Result time 10/28/17 12:45:38    Final result by Rei Huizar MD (10/28/17 12:45:38)                 Impression:        Improvement of presumed cardiogenic pulmonary edema since last chest radiograph 9/21/17.      Electronically signed by: REI HUIZAR MD  Date:     10/28/17  Time:    12:45              Narrative:    CLINICAL HISTORY:  sepsis    TECHNIQUE: Single view portable frontal radiograph of the chest    Comparison: AP chest radiograph dated 9/21/17    Findings:    Support devices: A right IJ CVC terminates with its tip projecting near the cavoatrial junction, stable.  Cardiac monitoring leads project over the  chest.    Chest: Cardiac silhouette is enlarged, stable.  Since the prior exam, there has been a decrease in the previously seen generalized increased interstitial markings, suggesting improvement in presumed cardiogenic pulmonary edema.  There are no new focal airspace opacities.  No pneumothorax or significant volume of pleural fluid is seen.  There are dense calcifications in the aortic arch.      Other: Osseous structures are intact.  Surgical staples project over the region of the xiphoid.  Linear metallic staple row projects over the left upper quadrant.  Partially visualized percutaneous biliary drain is seen in the right upper quadrant.                                Assessment/Plan:     * Septic shock    Leukocytosis, Metabolic acidosis with normal anion gap and bicarbonate losses   Patient meets criteria for septic shock due to HR 110s-140s, WBC count 16K, hypotension as low as 60s/30s and intra-abdominal as source of infection. Pt had recent e. Coli bacteremia from cholangitis and biliary stricture.  Given  1.2L NS, vancomycin, and cefepime in ED.  Continue cefepime.  Hydrate with IVFs.   Blood and urine cultures drawn before abx. PRN pain and nausea medications.           Acute renal failure    Uremia, acute hyperkalemia  , Cr 7.8 and K (after shifting in ED) 5.7.  Baseline creatinine ~1.0 with last baseline reading in [facility] on 9/25/2017.  NH staff reports poor PO intake and pt receiving lisinopril, lasix, potassium including this morning.  Significant hypotension likely contributed to renal failure too.  No evidence of infection on urinalysis.  Given IVF in ED which will be continued for comfort.  Pt states that she does NOT want dialysis.            Chronic diastolic heart failure    Biatrial enlargement, Pulmonary hypertension,   . Pt appears dehydrated.  CXR shows improvement since last admission.  Holding carvedilol, lasix, and lisinopril due to hypotension - pt received all this  morning at NH.  Continue to monitor.          Coronary artery disease due to lipid rich plaque    Denies CP.  Pt on ASA at NH which will be held here.            Essential hypertension    Severely hypotensive here. Received lisinopril and carvedilol with morning meds at NH this morning.  Holding due to hypotension.   Monitor.          Persistent atrial fibrillation    Current use of long term anticoagulation  Coumadin 5 mg currently on hold NH after supra-therapeutic INR at 3.9 on 10/16/17.   INR 2.0 today. Continue hold on Coumadin as pt is aspiration risk.  Monitor on telemetry.            History of Billroth II operation    C/O mild abdominal pain but no guarding on exam. Recent admission for upper GI bleeding, cholangitis and biliary stricture.  No elevation in LFTs. On daily PPI, Carafate BID and Actigall BID at NH which will be held here for now.            Iron deficiency anemia secondary to inadequate dietary iron intake    Chronic, stable. Holding iron supplement.  Continue to monitor.          Nursing home resident    DNR,   Patient is DNR at nursing home and confirms that she would like to remain DNR while hospitalized. Consulting Hospice for patient to return to [facility] with that service at discharge.             VTE Risk Mitigation     High risk. Pt on coumadin with INR 2.0. It is currently on hold at NH.              Dafne Pink PA-C  Department of Hospital Medicine   [facility]

## 2017-10-28 NOTE — ASSESSMENT & PLAN NOTE
Leukocytosis, Metabolic acidosis with normal anion gap and bicarbonate losses   Patient meets criteria for septic shock due to HR 110s-140s, WBC count 16K, hypotension as low as 60s/30s and intra-abdominal as source of infection. Pt had recent e. Coli bacteremia from cholangitis and biliary stricture.  Given 1.2L NS, vancomycin, and cefepime in ED.  Continue cefepime.  Hydrate with IVFs.  Blood and urine cultures drawn before abx. PRN pain and nausea medications.

## 2017-10-28 NOTE — ASSESSMENT & PLAN NOTE
Severely hypotensive here. Received lisinopril and carvedilol with morning meds at NH this morning.  Holding due to hypotension.  Monitor.

## 2017-10-28 NOTE — SUBJECTIVE & OBJECTIVE
Past Medical History:   Diagnosis Date    A-fib     Arthritis     Cataract     Chicken pox     Choledocholithiasis 05/16/2016    Chronic diastolic congestive heart failure     E coli bacteremia 09/22/2017    due to biliary stricture and cholangitis    Hypertension     Pancytopenia 2/6/2017    Ulcer        Past Surgical History:   Procedure Laterality Date    CHOLECYSTECTOMY      ERCP W/ SPHICTEROTOMY  05/16/2016    EYE SURGERY      HYSTERECTOMY      Pylorplasty      SMALL INTESTINE SURGERY      SPINE SURGERY      x2    STOMACH SURGERY         Review of patient's allergies indicates:   Allergen Reactions    Codeine sulfate Other (See Comments)     CONFUSION         No current facility-administered medications on file prior to encounter.      Current Outpatient Prescriptions on File Prior to Encounter   Medication Sig    ascorbic acid, vitamin C, (VITAMIN C) 500 MG tablet Take 500 mg by mouth 3 (three) times daily.    aspirin (ECOTRIN) 81 MG EC tablet Take 81 mg by mouth once daily.    cyanocobalamin (VITAMIN B-12) 1000 MCG tablet Take 1 tablet (1,000 mcg total) by mouth once daily.    docusate sodium (COLACE) 100 MG capsule Take 100 mg by mouth once daily.    ferrous sulfate 325 (65 FE) MG EC tablet Take 1 tablet (325 mg total) by mouth 3 (three) times daily with meals.    fluticasone (FLONASE) 50 mcg/actuation nasal spray 1 spray by Each Nare route once daily.    furosemide (LASIX) 40 MG tablet Take 1 tablet (40 mg total) by mouth once daily.    lisinopril (PRINIVIL,ZESTRIL) 20 MG tablet Take 2 tablets (40 mg total) by mouth nightly. (Patient taking differently: Take 20 mg by mouth nightly. )    loratadine (CLARITIN) 10 mg tablet Take 10 mg by mouth once daily.    ondansetron (ZOFRAN) 4 MG tablet Take 4 mg by mouth every 6 (six) hours as needed for Nausea.    pantoprazole (PROTONIX) 40 MG tablet Take 40 mg by mouth once daily.    potassium chloride SA (K-DUR,KLOR-CON) 10 MEQ tablet  Take 10 mEq by mouth once daily.     promethazine (PHENERGAN) 25 MG tablet Take 1 tablet (25 mg total) by mouth daily as needed for Nausea.    sucralfate (CARAFATE) 1 gram tablet Take 1 g by mouth 2 (two) times daily.    tramadol (ULTRAM) 50 mg tablet Take 50 mg by mouth every 8 (eight) hours as needed for Pain.    ursodiol (ACTIGALL) 300 mg capsule Take 1 capsule (300 mg total) by mouth 2 (two) times daily.    acetaminophen (TYLENOL) 500 MG tablet Take 500 mg by mouth every 6 (six) hours as needed for Pain.    albuterol-ipratropium 2.5mg-0.5mg/3mL (DUO-NEB) 0.5 mg-3 mg(2.5 mg base)/3 mL nebulizer solution Take 3 mLs by nebulization every 6 (six) hours as needed for Wheezing. Rescue    aluminum & magnesium hydroxide-simethicone (MYLANTA MAX STRENGTH) 400-400-40 mg/5 mL suspension Take 20 mLs by mouth every 6 (six) hours as needed for Indigestion.    hydrOXYzine pamoate (VISTARIL) 25 MG Cap Take 1 capsule (25 mg total) by mouth nightly as needed.    hyoscyamine (ANASPAZ,LEVSIN) 0.125 mg Tab Take 125 mcg by mouth every 6 (six) hours as needed (abdominal pain).     loperamide (IMODIUM) 2 mg capsule Take 2 mg by mouth every 2 (two) hours as needed for Diarrhea.    methocarbamol (ROBAXIN) 500 MG Tab Take 1 tablet (500 mg total) by mouth 2 (two) times daily as needed.    simethicone (MYLICON) 80 MG chewable tablet Take 80 mg by mouth every 6 (six) hours as needed for Flatulence.    warfarin (COUMADIN) 3 MG tablet Take 1 tablet (3 mg total) by mouth Daily. (Patient taking differently: Take 5 mg by mouth Daily. )    [DISCONTINUED] carvedilol (COREG) 25 MG tablet Take 1 tablet (25 mg total) by mouth 2 (two) times daily with meals. (Patient not taking: Reported on 10/28/2017)     Family History     Problem Relation (Age of Onset)    Kidney disease Mother    No Known Problems Father        Social History Main Topics    Smoking status: Never Smoker    Smokeless tobacco: Never Used    Alcohol use No    Drug  use: No    Sexual activity: No     Review of Systems   Unable to perform ROS: Acuity of condition     Objective:     Vital Signs (Most Recent):  Temp: 97.9 °F (36.6 °C) (10/28/17 1013)  Pulse: 106 (10/28/17 1618)  Resp: 18 (10/28/17 1618)  BP: (!) 88/46 (10/28/17 1618)  SpO2: 100 % (10/28/17 1618) Vital Signs (24h Range):  Temp:  [97.9 °F (36.6 °C)] 97.9 °F (36.6 °C)  Pulse:  [] 106  Resp:  [17-24] 18  SpO2:  [100 %] 100 %  BP: ()/(34-72) 88/46     Weight: 42.2 kg (93 lb)  Body mass index is 19.44 kg/m².    Physical Exam   Constitutional: She appears well-developed. No distress.   Cachectic with bitemporal wasting.    HENT:   Head: Normocephalic and atraumatic.   Mouth/Throat: No oropharyngeal exudate.   Dry mucous membranes   Eyes: Conjunctivae are normal. Pupils are equal, round, and reactive to light. No scleral icterus.   Neck: Neck supple. No JVD present. No thyromegaly present.   Right IJ catheter in place.    Cardiovascular:   Murmur heard.  Irregularly irregular rhythm    Pulmonary/Chest: Effort normal and breath sounds normal. No respiratory distress. She has no wheezes. She has no rales. She exhibits no tenderness.   Abdominal: Soft. Bowel sounds are normal. She exhibits no distension. There is tenderness. There is no rebound and no guarding.   Biliary drain intact with no surrounding erythema. Moderate amount of brown liquid in collection bag.    Musculoskeletal: She exhibits tenderness. She exhibits no edema.   Neurological:   Somnolent. Oriented to person and that she is at hospital. Hard of hearing.     Skin: Skin is warm and dry. Capillary refill takes 2 to 3 seconds. No rash noted. She is not diaphoretic.   Psychiatric:   Pt is somnolent but awakens easily.    Nursing note and vitals reviewed.       Significant Labs:   ABGs:   Recent Labs  Lab 10/28/17  1216   PH 7.116*   PCO2 30.0*   HCO3 9.7*   POCSATURATED 61*   BE -20     CBC:   Recent Labs  Lab 10/28/17  1159 10/28/17  1216   WBC  16.27*  --    HGB 11.1*  --    HCT 34.7* 43     --      CMP:   Recent Labs  Lab 10/28/17  1159   *   K 5.7*      CO2 13*   *   *   CREATININE 7.8*   CALCIUM 8.1*   PROT 4.8*   ALBUMIN 2.1*   BILITOT 0.7   ALKPHOS 139*   AST 15   ALT 9*   ANIONGAP 11   EGFRNONAA 4*     Cardiac Markers:   Recent Labs  Lab 10/28/17  1159   *     Coagulation:   Recent Labs  Lab 10/28/17  1159   INR 2.0*   APTT 56.7*     Lactic Acid:   Recent Labs  Lab 10/28/17  1159 10/28/17  1444   LACTATE 0.8 1.9     Lipase:   Recent Labs  Lab 10/28/17  1159   LIPASE 9     TSH:   Recent Labs  Lab 10/28/17  1159   TSH 6.326*     Urine Studies:   Recent Labs  Lab 10/28/17  1440   COLORU Donley*   APPEARANCEUA Hazy*   PHUR 6.0   SPECGRAV >=1.030*   PROTEINUA Trace*   GLUCUA Negative   KETONESU Trace*   BILIRUBINUA 1+*   OCCULTUA Negative   NITRITE Negative   UROBILINOGEN Negative   LEUKOCYTESUR Negative       Significant Imaging:     Imaging Results          CT Abdomen Pelvis  Without Contrast (Final result)  Result time 10/28/17 14:47:47    Final result by Rei Huizar MD (10/28/17 14:47:47)                 Impression:        No acute CT abnormalities of the abdomen and pelvis on this noncontrast enhanced exam.    Postoperative changes of prior bowel surgery which, according to the [facility] medical record, are due to a Billroth II procedure.    Percutaneous transhepatic biliary drainage catheter is in stable and appropriate position with its pigtail coiled in the duodenum.  No biliary tree dilation.  Status post cholecystectomy.    Multiple segments of small and large bowel demonstrating mild wall thickening.  Findings could suggest an enteritis/colitis.  Clinical correlation for the same recommended.      Electronically signed by: REI HUIZAR MD  Date:     10/28/17  Time:    14:47              Narrative:    Exam: CT abdomen pelvis without contrast    Technique: Axial imaging of the abdomen and pelvis  performed without IV contrast.  Coronal and sagittal reformats.    Reason: Abdominal pain    Comparison: CTA abdomen 9/21/17    Findings:    Beam hardening artifact is present from the patient's arms draped over her body as well as numerous radiodense metallic monitoring leads.  There is abundant motion artifact.    Calcified granulomas are noted in the right lung base.  Lung bases are otherwise clear without pleural fluid.    Visualized portions of the heart are enlarged.  No abnormal on the pericardial fluid is seen.    Multiple surgical clips and a radiodense staple line are noted in the region of the GE junction, similar to prior exam.  Prior bowel surgery is suspected due to altered anatomy seen on this scan, difficult to delineate in the absence of p.o. contrast.  Review of the [facility] medical record indicates the patient has Bilroth II anatomy.    The aorta is non-aneurysmal, with calcific atherosclerotic plaque along its course and branch vessels.    The gallbladder has been removed.  Dense Beam hardening artifact from metallic surgical clips obscures a large portion of the hepatic parenchyma.  There is a external percutaneous biliary drain to in place with its distal tip coiling in the duodenum.  The biliary tree does not appear dilated.  Hepatic parenchyma is homogenous.    The spleen, adrenal glands, and pancreas are unremarkable with noncontrast exam.  There is no evidence of bowel obstruction.  The numerous segments of small and large bowel demonstrate mild wall thickening.  Findings could suggest an enteritis/colitis.  Clinical correlation for the same recommended.  No abnormally enlarged lymph nodes are seen in the abdomen and pelvis.  No free air or free fluid is seen in the abdomen and pelvis.    Kidneys show no evidence of obstruction.  No evidence of nephrolithiasis.  Ill-defined subcentimeter focus of hyperattenuation is noted in the inferior pole the left kidney, similar to recent prior exam.  This  finding is of questionable clinical significance.  Further evaluation with contrast-enhanced cross-sectional imaging on a non-emergent, outpatient basis could be obtained if indicated.  Urinary bladder is not well distended, limiting evaluation.  The uterus has been removed.  No abnormal adnexal masses.    The osseous structures demonstrate age-appropriate degenerative changes of the spine, similar to prior exam.  No new acute fracture, dislocation, or osseous destructive process.  Levoscoliosis of the lumbar spine is again noted.                             X-Ray Chest AP Portable (Final result)  Result time 10/28/17 12:45:38    Final result by Rei Huizar MD (10/28/17 12:45:38)                 Impression:        Improvement of presumed cardiogenic pulmonary edema since last chest radiograph 9/21/17.      Electronically signed by: REI HUIZAR MD  Date:     10/28/17  Time:    12:45              Narrative:    CLINICAL HISTORY:  sepsis    TECHNIQUE: Single view portable frontal radiograph of the chest    Comparison: AP chest radiograph dated 9/21/17    Findings:    Support devices: A right IJ CVC terminates with its tip projecting near the cavoatrial junction, stable.  Cardiac monitoring leads project over the chest.    Chest: Cardiac silhouette is enlarged, stable.  Since the prior exam, there has been a decrease in the previously seen generalized increased interstitial markings, suggesting improvement in presumed cardiogenic pulmonary edema.  There are no new focal airspace opacities.  No pneumothorax or significant volume of pleural fluid is seen.  There are dense calcifications in the aortic arch.      Other: Osseous structures are intact.  Surgical staples project over the region of the xiphoid.  Linear metallic staple row projects over the left upper quadrant.  Partially visualized percutaneous biliary drain is seen in the right upper quadrant.

## 2017-10-28 NOTE — ASSESSMENT & PLAN NOTE
Biatrial enlargement, Pulmonary hypertension,   . Pt appears dehydrated.  CXR shows improvement since last admission.  Holding carvedilol, lasix, and lisinopril due to hypotension - pt received all this morning at NH.  Continue to monitor.

## 2017-10-28 NOTE — ED NOTES
82 year old [facility]. Report received from kana jorgensen that patient was hypotensive and has not been eating or drinking well. Upon ed arrival patient awake alert oriented to place and self. Patient denies chest pain sob states abdominal pain.

## 2017-10-28 NOTE — ASSESSMENT & PLAN NOTE
Current use of long term anticoagulation  Coumadin 5 mg currently on hold NH after supra-therapeutic INR at 3.9 on 10/16/17.  INR 2.0 today. Continue hold on Coumadin as pt is aspiration risk.  Monitor on telemetry.

## 2017-10-29 PROCEDURE — 63600175 PHARM REV CODE 636 W HCPCS: Performed by: PHYSICIAN ASSISTANT

## 2017-10-29 PROCEDURE — 11000001 HC ACUTE MED/SURG PRIVATE ROOM

## 2017-10-29 PROCEDURE — 25000003 PHARM REV CODE 250: Performed by: PHYSICIAN ASSISTANT

## 2017-10-29 PROCEDURE — S0028 INJECTION, FAMOTIDINE, 20 MG: HCPCS | Performed by: PHYSICIAN ASSISTANT

## 2017-10-29 PROCEDURE — 94761 N-INVAS EAR/PLS OXIMETRY MLT: CPT

## 2017-10-29 RX ORDER — DOXYCYCLINE 100 MG/1
100 CAPSULE ORAL EVERY 12 HOURS
Qty: 10 CAPSULE | Refills: 0 | Status: SHIPPED | OUTPATIENT
Start: 2017-10-29 | End: 2017-10-30

## 2017-10-29 RX ORDER — ASCORBIC ACID 500 MG
500 TABLET ORAL 3 TIMES DAILY
Status: ON HOLD | COMMUNITY
End: 2017-10-29 | Stop reason: HOSPADM

## 2017-10-29 RX ORDER — CARVEDILOL 3.12 MG/1
3.12 TABLET ORAL 2 TIMES DAILY
COMMUNITY

## 2017-10-29 RX ADMIN — CEFEPIME HYDROCHLORIDE 1 G: 1 INJECTION, SOLUTION INTRAVENOUS at 08:10

## 2017-10-29 RX ADMIN — ACETAMINOPHEN 650 MG: 325 TABLET ORAL at 06:10

## 2017-10-29 RX ADMIN — FAMOTIDINE 20 MG: 10 INJECTION, SOLUTION INTRAVENOUS at 08:10

## 2017-10-29 RX ADMIN — SODIUM CHLORIDE: 0.9 INJECTION, SOLUTION INTRAVENOUS at 08:10

## 2017-10-29 NOTE — PROGRESS NOTES
TN contacted [facility] [phone] regarding hospice agencies that they contract with. Recptionist gave TN two agencies:   [facility] [phone] and [facility] [phone].    TN spoke with patient, [place] called [facility], Delia Baig, [phone], fax [phone]. They will send a rep out to speak with the patient in the morning regarding home hospice care when she returns back to [facility]

## 2017-10-29 NOTE — ASSESSMENT & PLAN NOTE
Leukocytosis, Metabolic acidosis with normal anion gap and bicarbonate losses   Pt alert and talkative today.  BP fluctuating.  Blood culture NG x 1 days. No labs drawn as patient is comfort care only.  Pt had recent e. Coli bacteremia from cholangitis and biliary stricture.  Given 1.2L NS, vancomycin, and cefepime in ED.  Continue cefepime, day #2.  Continue gentle IVFs.  PRN pain and nausea medications.

## 2017-10-29 NOTE — PLAN OF CARE
[facility]  Emory Yadav MD, [facility]   MD Elgin Llanes, KOLE Tsang, [facility]  [address] [place] [address]  Office Phone: [phone]  Office Fax: [phone]                                     HOSPICE  ORDERS     Patient Name: Jazmín Bishop  YOB: 1935    10/29/2017    Admit to Hospice:  Home Service at [facility]    Diagnoses:  Active Hospital Problems    Diagnosis  POA    *Septic shock [A41.9, R65.21]  Yes     Priority: 1 - High    Acute renal failure [N17.9]  Yes     Priority: 2     Chronic diastolic heart failure [I50.32]  Yes     Priority: 3      Chronic     2D Echo 9/22/2017: EF 50%, moderate MVR, PAP 53, Severe TVR, biatrial enlargement       Coronary artery disease due to lipid rich plaque [I25.10, I25.83]  Yes     Priority: 4      presumed; never had LHC; Pt declines and is aware iof risk of delaying diagnosis; no current CP or angina; CCS 0      Essential hypertension [I10]  Yes     Priority: 5      Chronic    Persistent atrial fibrillation [I48.1]  Yes     Priority: 6      Chronic    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]  Not Applicable     Priority: 7     Iron deficiency anemia secondary to inadequate dietary iron intake [D50.8]  Yes     Priority: 8     Nursing home resident [Z59.3]  Not Applicable     Priority: 9      Chronic     [facility] ([address] [facility], [place] [address])       Uremia due to inadequate renal perfusion [N19]  Yes    Acute hyperkalemia [E87.5]  Yes    Metabolic acidosis with normal anion gap and bicarbonate losses [E87.2]  Yes    Leukocytosis [D72.829]  Yes    Comfort measures only status [Z51.5]  Not Applicable    Current use of long term anticoagulation [Z79.01]  Not Applicable     Chronic    DNR (do not resuscitate) [Z66]  Yes     Chronic    Biatrial enlargement [I51.7]  Yes     Chronic     Pulmonary hypertension [I27.20]  Yes      Resolved Hospital Problems    Diagnosis Date Resolved POA   No resolved problems to display.       Hospice Qualifying Diagnoses: Septic shock, Acute renal failure, hyperkalemia        Patient has a life expectancy < 6 months due to these conditions.    Vital Signs: Routine per Hospice Protocol.    Allergies:  Review of patient's allergies indicates:   Allergen Reactions    Codeine sulfate Other (See Comments)     CONFUSION         Diet: soft diet     Activities: activity as tolerated    Nursing: Per Hospice Routine    Future Orders:  Hospice Medical Director may dictate new orders for comfortable care measures & sign death certificate.    Medications:         Comfort Care Medications as prescribed by Hospice Medical Director        Dario Misaeleli Spencer   Home Medication Instructions ALANNA:[id number]    Printed on:10/29/17 1045   Medication Information                      acetaminophen (TYLENOL) 500 MG tablet  Take 500 mg by mouth every 6 (six) hours as needed for mild Pain.             albuterol-ipratropium 2.5mg-0.5mg/3mL (DUO-NEB) 0.5 mg-3 mg(2.5 mg base)/3 mL nebulizer solution  Take 3 mLs by nebulization every 6 (six) hours as needed for Wheezing. Rescue             aluminum & magnesium hydroxide-simethicone (MYLANTA MAX STRENGTH) 400-400-40 mg/5 mL suspension  Take 20 mLs by mouth every 6 (six) hours as needed for Indigestion.             carvedilol (COREG) 3.125 MG tablet  Take 3.125 mg by mouth 2 (two) times daily. Hold for SBP < 110 or HR < 70             docusate sodium (COLACE) 100 MG capsule  Take 100 mg by mouth once daily.             doxycycline (VIBRAMYCIN) 100 MG Cap  Take 1 capsule (100 mg total) by mouth every 12 (twelve) hours for 5 days (through 11/3/2017.             fluticasone (FLONASE) 50 mcg/actuation nasal spray  1 spray by Each Nare route once daily.             hydrocodone-acetaminophen 5-325mg (NORCO) 5-325 mg per tablet  Take 1 tablet by mouth  every 6 (six) hours as needed for moderate Pain.             hydrOXYzine pamoate (VISTARIL) 25 MG Cap  Take 1 capsule (25 mg total) by mouth nightly as needed for anxiety or insomina.             loperamide (IMODIUM) 2 mg capsule  Take 2 mg by mouth every 2 (two) hours as needed for Diarrhea.             ondansetron (ZOFRAN) 4 MG tablet  Take 4 mg by mouth every 6 (six) hours as needed for Nausea.             pantoprazole (PROTONIX) 40 MG tablet  Take 40 mg by mouth once daily.             promethazine (PHENERGAN) 25 MG tablet  Take 1 tablet (25 mg total) by mouth daily as needed for Nausea.             simethicone (MYLICON) 80 MG chewable tablet  Take 80 mg by mouth every 6 (six) hours as needed for Flatulence.             sucralfate (CARAFATE) 1 gram tablet  Take 1 g by mouth 2 (two) times daily.             ursodiol (ACTIGALL) 300 mg capsule  Take 1 capsule (300 mg total) by mouth 2 (two) times daily.

## 2017-10-29 NOTE — ASSESSMENT & PLAN NOTE
Uremia, acute hyperkalemia  No repeat labs as pt is comfort care only and does not want dialysis.  , Cr 7.8 and K (after shifting in ED) 5.7.  Baseline creatinine ~1.0 with last baseline reading in EPIC on 9/25/2017.  NH staff reports poor PO intake and pt receiving lisinopril, lasix, potassium including this morning.  Significant hypotension likely contributing to renal failure too.  No evidence of infection on urinalysis.  Given IVF in ED which will be continued for comfort.

## 2017-10-29 NOTE — ASSESSMENT & PLAN NOTE
Biatrial enlargement, Pulmonary hypertension,   Appears better compensated today.  . CXR shows improvement since last admission.  Holding carvedilol, lasix, and lisinopril due to hypotension.  Continue to monitor.

## 2017-10-29 NOTE — ASSESSMENT & PLAN NOTE
DNR,   Patient is DNR at nursing home and confirms that she would like to remain DNR while hospitalized. Consulting Hospice for patient to return to [facility] with that service at discharge.

## 2017-10-29 NOTE — SUBJECTIVE & OBJECTIVE
Interval History: Pt states she feels achy all over. Eating lemon ice and jello.  Denies CP, SOB.  Talkative today.     Review of Systems   Constitutional: Positive for fatigue. Negative for chills and fever.   Respiratory: Negative for cough and shortness of breath.    Cardiovascular: Negative for chest pain and palpitations.   Gastrointestinal: Negative for nausea and vomiting.   Musculoskeletal: Positive for myalgias.   Neurological: Positive for weakness. Negative for dizziness and headaches.     Objective:     Vital Signs (Most Recent):  Temp: 98.1 °F (36.7 °C) (10/29/17 0755)  Pulse: (!) 130 (10/29/17 0755)  Resp: 20 (10/29/17 0755)  BP: (!) 89/50 (10/29/17 0755)  SpO2: 97 % (10/29/17 0425) Vital Signs (24h Range):  Temp:  [97.4 °F (36.3 °C)-98.1 °F (36.7 °C)] 98.1 °F (36.7 °C)  Pulse:  [] 130  Resp:  [17-24] 20  SpO2:  [93 %-100 %] 97 %  BP: ()/(34-72) 89/50     Weight: 40.2 kg (88 lb 10 oz)  Body mass index is 18.52 kg/m².    Intake/Output Summary (Last 24 hours) at 10/29/17 1018  Last data filed at 10/29/17 0614   Gross per 24 hour   Intake              900 ml   Output              850 ml   Net               50 ml      Physical Exam   Constitutional: No distress.   Thin   Cardiovascular:   No murmur heard.  Irregularly irregular rhythm    Pulmonary/Chest: Effort normal and breath sounds normal. No respiratory distress. She has no wheezes.   Abdominal: Soft. There is no tenderness.   Musculoskeletal: Normal range of motion. She exhibits no edema.   Neurological: She is alert.   Oriented to person and place.  Good recall of past events.    Psychiatric: She has a normal mood and affect.   Nursing note and vitals reviewed.      Significant Labs:   Blood Culture:   Recent Labs  Lab 10/28/17  1219   LABBLOO No Growth to date       Significant Imaging: no new

## 2017-10-29 NOTE — PLAN OF CARE
Problem: Patient Care Overview  Goal: Plan of Care Review  Awake and alert this am.communicating appropriately. C/o of being hungry. Ate jello. Tolerated well. Requesting soup. Making chicken broth for her.no true red alarms noted on telemetry. A-fib noted with HR from 110-122.

## 2017-10-29 NOTE — ASSESSMENT & PLAN NOTE
Recent admission for upper GI bleeding, cholangitis and biliary stricture.  No elevation in LFTs. On daily PPI, Carafate BID and Actigall BID at NH which will be held here for now.  BID Famotidine

## 2017-10-29 NOTE — PLAN OF CARE
Nurse gave report and turned over care to night nurse. Night nurse advised of the patient's current b/p:93/51.

## 2017-10-29 NOTE — PLAN OF CARE
Problem: Patient Care Overview  Goal: Plan of Care Review  Outcome: Ongoing (interventions implemented as appropriate)  Reviewed plan of care with pt. Pt complaints of pain in her feet. Pt awaiting discharge with hospice. Comfort measures are in place. Safety measures are in place, bed low and in lock position, call light in reach, and bed alarm is on. Pt verbalizes full understanding of their plan of care.

## 2017-10-29 NOTE — ASSESSMENT & PLAN NOTE
Current use of long term anticoagulation  HR fluctuating.  Coumadin 5 mg currently on hold NH after supra-therapeutic INR at 3.9 on 10/16/17.  INR 2.0 on admission. Continue hold on Coumadin as pt is being discharged with hospice.  Monitor on telemetry.

## 2017-10-29 NOTE — HOSPITAL COURSE
Pt awake and alert since 10/29/2017.  States she wants to go home and not return to the hospital anymore.  She does not want any lab work done.  Discharging back to [facility] with Hospice. Pt wants someone to inform her sister in [place] when she dies but not before.

## 2017-10-29 NOTE — PROGRESS NOTES
[facility] Hospital Medicine  Progress Note    Patient Name: Jazmín Bishop  MRN: [id number]  Patient Class: IP- Inpatient   Admission Date: 10/28/2017  Length of Stay: 1 days  Attending Physician: Emory Yadav MD  Primary Care Provider: Luis Butcher MD        Subjective:     Principal Problem:Septic shock    HPI:  82 y.o. Female with hypertension, chronic systolic congestive heart failure, chronic atrial fibrillation (anticoagulated on warfarin), gastroesophageal reflux disease, osteoarthritis, history of pulmonary embolism, choledocholithiasis that was unable to be treated endoscopically (instead treated with ursodiol), vitamin B12 and iron deficiency anemia.  She lives at [facility] in [place], [place].  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  She never  and does not have any children.  Her primary care physician is Dr. Luis Sparrow.                She had an ERCP with sphincterotomy done at [facility] on 5/16/16 by Dr. 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  Biliary stones could not be removed due to the sharp angulation of the bile duct, which inhibited withdrawal of a balloon tipped catheter.  She was put on ursodiol and the plan was to repeat ERCP only if she had increasing cholestasis or cholangitis.                She returned to [facility] ED on 9/21/17 with 1 week of constant aching generalized abdominal pain, worst in the left upper quadrant and right lower quadrant, accompanied by fever, nausea, and vomiting.  She had coffee ground emesis noted.  She had recently been diagnosed with moderate ileus on 9/13/17 and was treated with bisacodyl and an enema.  She was found to have colitis, biliary pancreatitis, cholestasis, and sepsis (temperature 103.6 F, total bilirubin 2.7, , , alkaline phosphatase  403, lipase >1000, abdominal CT showing colitis and worsening intrahepatic and extrahepatic biliary dilatation.  She was given cefepime, vancomycin, acetaminophen, morphine, fentanyl, and 2.5 liters of normal saline.  She was admitted to [facility] with a Gastroenterology consult.     While in the ED on 9/21/2017, her blood pressure decreased to 60s/30s requiring placement of a right internal jugular triple lumen central venous catheter and initiation of norepinephrine infusion.  Cefepime and ursodiol were continued as she awaited Gastroenterology.  Blood culture grew Gram negative rods.  Gastroenterology consulted Interventional Radiology to perform percutaneous transhepatic cholangiography and drain placement.  There were no stones but there was resistance at the ampulla suggesting a biliary stricture.  Sepsis and liver enzymes rapidly improved afterward.  She required furosemide after getting too much fluids, and diuresed well.  Blood culture grew E coli and biliary drain culture grew Enterococcus.  She was put on amoxicillin-clavulanate for another 7 days and discharged back to the [facility] on 9/25/17 with the drain in place.  She  followed up with Gastroenterology on 10/19 and plans were made for repeat ERCP and drain removal on 11/13/2017.      Pt [facility] 10[facility]28[facility]2017 from nursing home who sent her here because she has not been eating or drinking for a few days and they are concerned about her INR level which was elevated at 3.93 on 10/16/2017.  They were unable to collect any blood from her as she is a difficult stick and is now dehydrated.  Work-up in ED: Severely hypotensive with SBP in 60s. Elevated , Cr 7.8, Potassium 7.4 shifted down to 5.7 by ED MD.   Pt has elevated WBC 16K, venous blood gas pH 7.1 with decreased pCO2 (and plasma CO2) and decreased HCO3.  There is no elevation in lactic acid but procalcitonin is elevated at 3; troponin slightly elevated 0.065  "and  which is lower than previous hospitalizations.      Admitting to [facility] for initiation of Hospice with comfort care measures here. Pt confirms that she does NOT want to go to ICU for medication to increase her blood pressure and she does NOT want dialysis.  She states "just keep me comfortable".  Dr. Ordonez, ED MD, spoke to MELISA West, pt's POA, and confirmed her DNR status and desire for comfort care only.      Hospital Course:  Pt awake and alert today.  States she wants to go home and not return to the hospital anymore.      Interval History: Pt states she feels achy all over. Eating lemon ice and jello.  Denies CP, SOB.  Talkative today.     Review of Systems   Constitutional: Positive for fatigue. Negative for chills and fever.   Respiratory: Negative for cough and shortness of breath.    Cardiovascular: Negative for chest pain and palpitations.   Gastrointestinal: Negative for nausea and vomiting.   Musculoskeletal: Positive for myalgias.   Neurological: Positive for weakness. Negative for dizziness and headaches.     Objective:     Vital Signs (Most Recent):  Temp: 98.1 °F (36.7 °C) (10/29/17 0755)  Pulse: (!) 130 (10/29/17 0755)  Resp: 20 (10/29/17 0755)  BP: (!) 89/50 (10/29/17 0755)  SpO2: 97 % (10/29/17 0425) Vital Signs (24h Range):  Temp:  [97.4 °F (36.3 °C)-98.1 °F (36.7 °C)] 98.1 °F (36.7 °C)  Pulse:  [] 130  Resp:  [17-24] 20  SpO2:  [93 %-100 %] 97 %  BP: ()/(34-72) 89/50     Weight: 40.2 kg (88 lb 10 oz)  Body mass index is 18.52 kg/m².    Intake/Output Summary (Last 24 hours) at 10/29/17 1018  Last data filed at 10/29/17 0614   Gross per 24 hour   Intake              900 ml   Output              850 ml   Net               50 ml      Physical Exam   Constitutional: No distress.   Thin   Cardiovascular:   No murmur heard.  Irregularly irregular rhythm    Pulmonary/Chest: Effort normal and breath sounds normal. No respiratory distress. She has no wheezes. "   Abdominal: Soft. There is no tenderness.   Musculoskeletal: Normal range of motion. She exhibits no edema.   Neurological: She is alert.   Oriented to person and place.  Good recall of past events.    Psychiatric: She has a normal mood and affect.   Nursing note and vitals reviewed.      Significant Labs:   Blood Culture:   Recent Labs  Lab 10/28/17  1219   LABBLOO No Growth to date       Significant Imaging: no new    Assessment/Plan:      * Septic shock    Leukocytosis, Metabolic acidosis with normal anion gap and bicarbonate losses   Pt alert and talkative today.  BP fluctuating.  Blood culture NG x 1 days. No labs drawn as patient is comfort care only.  Pt had recent e. Coli bacteremia from cholangitis and biliary stricture.  Given 1.2L NS, vancomycin, and cefepime in ED.  Continue cefepime, day #2.   Continue gentle IVFs.  PRN pain and nausea medications.           Acute renal failure    Uremia, acute hyperkalemia  No repeat labs as pt is comfort care only and does not want dialysis.  , Cr 7.8 and K (after shifting in ED) 5.7.  Baseline creatinine ~1.0 with last baseline reading in EPIC on 9/25/2017.  NH staff reports poor PO intake and pt receiving lisinopril, lasix, potassium including this morning.  Significant hypotension likely contributing to renal failure too.  No evidence of infection on urinalysis.  Given IVF in ED which will be continued for comfort.          Chronic diastolic heart failure    Biatrial enlargement, Pulmonary hypertension,   Appears better compensated today.  . CXR shows improvement since last admission.  Holding carvedilol, lasix, and lisinopril due to hypotension.  Continue to monitor.          Coronary artery disease due to lipid rich plaque    Denies CP.  Pt on ASA at NH which will be held here for now.            Essential hypertension    BP fluctuating. Hold home lisinopril and carvedilol due to hypotension.  Monitor.          Persistent atrial fibrillation     Current use of long term anticoagulation  HR fluctuating.  Coumadin 5 mg currently on hold NH after supra-therapeutic INR at 3.9 on 10/16/17.  INR 2.0 on admission. Continue hold on Coumadin as pt is being discharged with hospice.  Monitor on telemetry.            History of Billroth II operation    Recent admission for upper GI bleeding, cholangitis and biliary stricture.  No elevation in LFTs. On daily PPI, Carafate BID and Actigall BID at NH which will be held here for now.  BID Famotidine          Iron deficiency anemia secondary to inadequate dietary iron intake    Chronic, stable. Holding iron supplement.          Nursing home resident    DNR,   Patient is DNR at nursing home and confirms that she would like to remain DNR while hospitalized. Consulting Hospice for patient to return to [facility] with that service at discharge.             VTE Risk Mitigation         Ordered     High Risk of VTE  Once      10/28/17 1837     Reason for No Pharmacological VTE Prophylaxis  Once      10/28/17 1837              Dafne Pink PA-C  Department of Hospital Medicine   [facility]

## 2017-10-30 VITALS
SYSTOLIC BLOOD PRESSURE: 118 MMHG | TEMPERATURE: 98 F | RESPIRATION RATE: 19 BRPM | WEIGHT: 88.63 LBS | DIASTOLIC BLOOD PRESSURE: 76 MMHG | BODY MASS INDEX: 18.6 KG/M2 | HEIGHT: 58 IN | HEART RATE: 61 BPM | OXYGEN SATURATION: 98 %

## 2017-10-30 LAB — BACTERIA UR CULT: NORMAL

## 2017-10-30 PROCEDURE — 63600175 PHARM REV CODE 636 W HCPCS: Performed by: PHYSICIAN ASSISTANT

## 2017-10-30 PROCEDURE — 94761 N-INVAS EAR/PLS OXIMETRY MLT: CPT

## 2017-10-30 PROCEDURE — S0028 INJECTION, FAMOTIDINE, 20 MG: HCPCS | Performed by: PHYSICIAN ASSISTANT

## 2017-10-30 PROCEDURE — 25000003 PHARM REV CODE 250: Performed by: PHYSICIAN ASSISTANT

## 2017-10-30 RX ORDER — DOXYCYCLINE 100 MG/1
100 CAPSULE ORAL EVERY 12 HOURS
Qty: 10 CAPSULE | Refills: 0
Start: 2017-10-30 | End: 2017-11-04

## 2017-10-30 RX ORDER — SUCRALFATE 1 G/1
1 TABLET ORAL 2 TIMES DAILY
Status: DISCONTINUED | OUTPATIENT
Start: 2017-10-30 | End: 2017-10-30 | Stop reason: HOSPADM

## 2017-10-30 RX ORDER — URSODIOL 300 MG/1
300 CAPSULE ORAL 2 TIMES DAILY
Status: DISCONTINUED | OUTPATIENT
Start: 2017-10-30 | End: 2017-10-30 | Stop reason: HOSPADM

## 2017-10-30 RX ADMIN — SUCRALFATE 1 G: 1 TABLET ORAL at 11:10

## 2017-10-30 RX ADMIN — ACETAMINOPHEN 650 MG: 325 TABLET ORAL at 07:10

## 2017-10-30 RX ADMIN — URSODIOL 300 MG: 300 CAPSULE ORAL at 11:10

## 2017-10-30 RX ADMIN — SODIUM CHLORIDE: 0.9 INJECTION, SOLUTION INTRAVENOUS at 02:10

## 2017-10-30 RX ADMIN — CEFEPIME HYDROCHLORIDE 1 G: 1 INJECTION, SOLUTION INTRAVENOUS at 10:10

## 2017-10-30 RX ADMIN — FAMOTIDINE 20 MG: 10 INJECTION, SOLUTION INTRAVENOUS at 10:10

## 2017-10-30 NOTE — DISCHARGE SUMMARY
[facility] Medicine  Discharge Summary      Patient Name: Jazmín Bishop  MRN: [id number]  Admission Date: 10/28/2017  Hospital Length of Stay: 2 days  Discharge Date and Time:  10/30/2017 9:10 AM  Attending Physician: Emory Yadav MD   Discharging Provider: Dafne Pink PA-C  Primary Care Provider: Luis Butcher MD      HPI:   82 y.o. Female with hypertension, chronic systolic congestive heart failure, chronic atrial fibrillation (anticoagulated on warfarin), gastroesophageal reflux disease, osteoarthritis, history of pulmonary embolism, choledocholithiasis that was unable to be treated endoscopically (instead treated with ursodiol), vitamin B12 and iron deficiency anemia.  She lives at [facility] in [place], [place].  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  She never  and does not have any children.  Her primary care physician is Dr. Luis Sparrow.                She had an ERCP with sphincterotomy done at [facility] on 5/16/16 by Dr. 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  Biliary stones could not be removed due to the sharp angulation of the bile duct, which inhibited withdrawal of a balloon tipped catheter.  She was put on ursodiol and the plan was to repeat ERCP only if she had increasing cholestasis or cholangitis.                She returned to [facility] ED on 9/21/17 with 1 week of constant aching generalized abdominal pain, worst in the left upper quadrant and right lower quadrant, accompanied by fever, nausea, and vomiting.  She had coffee ground emesis noted.  She had recently been diagnosed with moderate ileus on 9/13/17 and was treated with bisacodyl and an enema.  She was found to have colitis, biliary pancreatitis, cholestasis, and sepsis (temperature 103.6 F, total bilirubin 2.7, , ALT  116, alkaline phosphatase 403, lipase >1000, abdominal CT showing colitis and worsening intrahepatic and extrahepatic biliary dilatation.  She was given cefepime, vancomycin, acetaminophen, morphine, fentanyl, and 2.5 liters of normal saline.  She was admitted to [facility] with a Gastroenterology consult.     While in the ED on 9/21/2017, her blood pressure decreased to 60s/30s requiring placement of a right internal jugular triple lumen central venous catheter and initiation of norepinephrine infusion.  Cefepime and ursodiol were continued as she awaited Gastroenterology.  Blood culture grew Gram negative rods.  Gastroenterology consulted Interventional Radiology to perform percutaneous transhepatic cholangiography and drain placement.  There were no stones but there was resistance at the ampulla suggesting a biliary stricture.  Sepsis and liver enzymes rapidly improved afterward.  She required furosemide after getting too much fluids, and diuresed well.  Blood culture grew E coli and biliary drain culture grew Enterococcus.  She was put on amoxicillin-clavulanate for another 7 days and discharged back to the [facility] on 9/25/17 with the drain in place.  She  followed up with Gastroenterology on 10/19 and plans were made for repeat ERCP and drain removal on 11/13/2017.      Pt [facility] to [facility] on 10/28/2017 from nursing home who sent her here because she has not been eating or drinking for a few days and they are concerned about her INR level which was elevated at 3.93 on 10/16/2017.  They were unable to collect any blood from her as she is a difficult stick and is now dehydrated.  Work-up in ED: Severely hypotensive with SBP in 60s. Elevated , Cr 7.8, Potassium 7.4 shifted down to 5.7 by ED MD.   Pt has elevated WBC 16K, venous blood gas pH 7.1 with decreased pCO2 (and plasma CO2) and decreased HCO3.  There is no elevation in lactic acid but procalcitonin is elevated at 3;  "troponin slightly elevated 0.065 and  which is lower than previous hospitalizations.      Admitting to [facility] for initiation of Hospice with comfort care measures here. Pt confirms that she does NOT want to go to ICU for medication to increase her blood pressure and she does NOT want dialysis.  She states "just keep me comfortable".  Dr. Ordonez, ED MD, spoke to MELISA West, pt's POA, and confirmed her DNR status and desire for comfort care only.      * No surgery found *      Hospital Course:   Pt awake and alert since 10/29/2017.  States she wants to go home and not return to the hospital anymore.  She does not want any lab work done.  Discharging back to [facility] with Hospice. Pt wants someone to inform her sister in [place] when she dies but not before.       Consults:   Consults         Status Ordering Provider     Inpatient consult to Social Work  Once     Provider:  (Not yet assigned)    Acknowledged JOHNNY CORDON          * Septic shock    Leukocytosis, Metabolic acidosis with normal anion gap and bicarbonate losses   BP fluctuating but still low overall.  Pt alert and talkative again today.  Blood culture NG x 2 days. Urine culture NG. Afebrile.  No labs drawn as patient is comfort care only.  Pt had recent e. Coli bacteremia from cholangitis and biliary stricture.  Given 1.2L NS, vancomycin, and cefepime in ED. Received cefepime x 3 days.   Discharging on Doxycycline through 11/3/2017.  PRN pain and nausea medications.           Acute renal failure    Uremia, acute hyperkalemia  Good urine output.  No repeat labs as pt is comfort care only and does not want dialysis.  , Cr 7.8 and K (after shifting in ED) 5.7.  Baseline creatinine ~1.0 with last baseline reading in EPIC on 9/25/2017.  NH staff reports poor PO intake and pt receiving regular home meds.  Significant hypotension likely contributing to renal failure too.  No evidence of infection on urinalysis.        "   Chronic diastolic heart failure    Biatrial enlargement, Pulmonary hypertension,   Appears compensated today.   on admission. CXR shows improvement since last admission.  Holding carvedilol, lasix, and lisinopril due to hypotension.            Coronary artery disease due to lipid rich plaque    Denies CP.  Pt on ASA at NH.           Essential hypertension    BP fluctuating but overall very low. Held home lisinopril and carvedilol due to hypotension. Resume low dose carvedilol with parameters.           Persistent atrial fibrillation    Current use of long term anticoagulation  HR fluctuating.  Coumadin 5 mg currently on hold NH after supra-therapeutic INR at 3.9 on 10/16/17.  INR 2.0 on admission. Continue hold on Coumadin as pt is being discharged with hospice.            History of Billroth II operation    Recent admission for upper GI bleeding, cholangitis and biliary stricture.  No elevation in LFTs. On daily PPI, Carafate BID and Actigall BID at NH which may be resumed.           Iron deficiency anemia secondary to inadequate dietary iron intake    Chronic, stable. Holding iron supplement.          Nursing home resident    DNR,   Patient is DNR at nursing home and confirms that she would like to remain DNR while hospitalized. Refused dialysis, labs.   Consulted Hospice for patient to return to [facility] with that service at discharge.             Final Active Diagnoses:    Diagnosis Date Noted POA    PRINCIPAL PROBLEM:  Septic shock [A41.9, R65.21] 10/28/2017 Yes    Acute renal failure [N17.9] 10/28/2017 Yes    Chronic diastolic heart failure [I50.32] 02/06/2017 Yes     Chronic    Coronary artery disease due to lipid rich plaque [I25.10, I25.83] 06/23/2015 Yes    Essential hypertension [I10] 07/03/2014 Yes     Chronic    Persistent atrial fibrillation [I48.1] 02/06/2017 Yes     Chronic    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] 05/16/2016 Not Applicable    Iron deficiency anemia secondary  to inadequate dietary iron intake [D50.8] 02/08/2017 Yes    Nursing home resident [Z59.3] 02/07/2016 Not Applicable     Chronic    Uremia due to inadequate renal perfusion [N19] 10/28/2017 Yes    Acute hyperkalemia [E87.5] 10/28/2017 Yes    Metabolic acidosis with normal anion gap and bicarbonate losses [E87.2] 10/28/2017 Yes    Leukocytosis [D72.829] 10/28/2017 Yes    Comfort measures only status [Z51.5] 10/28/2017 Not Applicable    Current use of long term anticoagulation [Z79.01] 09/22/2017 Not Applicable     Chronic    DNR (do not resuscitate) [Z66] 09/22/2017 Yes     Chronic    Biatrial enlargement [I51.7] 02/06/2017 Yes     Chronic    Pulmonary hypertension [I27.20] 06/23/2015 Yes      Problems Resolved During this Admission:    Diagnosis Date Noted Date Resolved POA       Discharged Condition: poor    Disposition: Hospice/Home    Follow Up:  Follow-up Information     Luis Butcher MD In 1 week.    Specialty:  Family Medicine  Why:  hospital follow-up  Contact information:  [address] [place] [id number] [phone]                 Patient Instructions:     Diet general     Activity as tolerated     Call MD for:  severe uncontrolled pain     Call MD for:  difficulty breathing or increased cough     Call MD for:  persistent dizziness, light-headedness, or visual disturbances     Call MD for:  increased confusion or weakness         Significant Diagnostic Studies: Labs:   CMP   Recent Labs  Lab 10/28/17  1159   *   K 5.7*      CO2 13*   *   *   CREATININE 7.8*   CALCIUM 8.1*   PROT 4.8*   ALBUMIN 2.1*   BILITOT 0.7   ALKPHOS 139*   AST 15   ALT 9*   ANIONGAP 11   ESTGFRAFRICA 5*   EGFRNONAA 4*    and CBC   Recent Labs  Lab 10/28/17  1159 10/28/17  1216   WBC 16.27*  --    HGB 11.1*  --    HCT 34.7* 43     --      Microbiology Results (last 7 days)     Procedure Component Value Units Date/Time    Urine culture [[id number]] Collected:  10/28/17 1440    Order  Status:  Completed Specimen:  Urine from Urine, Catheterized Updated:  10/30/17 0022     Urine Culture, Routine No significant growth    Blood culture x two cultures. Draw prior to antibiotics. [[id number]] Collected:  10/28/17 1219    Order Status:  Completed Specimen:  Blood from Peripheral, Antecubital, Right Updated:  10/29/17 2012     Blood Culture, Routine No Growth to date     Blood Culture, Routine No Growth to date    Narrative:       Aerobic and anaerobic    Blood culture x two cultures. Draw prior to antibiotics. [[id number]] Collected:  10/28/17 1218    Order Status:  Sent Specimen:  Blood from Peripheral, Antecubital, Right Updated:  10/28/17 1219        Imaging Results          CT Abdomen Pelvis  Without Contrast (Final result)  Result time 10/28/17 14:47:47    Final result by Rei Huizar MD (10/28/17 14:47:47)                 Impression:        No acute CT abnormalities of the abdomen and pelvis on this noncontrast enhanced exam.    Postoperative changes of prior bowel surgery which, according to the [facility] medical record, are due to a Billroth II procedure.    Percutaneous transhepatic biliary drainage catheter is in stable and appropriate position with its pigtail coiled in the duodenum.  No biliary tree dilation.  Status post cholecystectomy.    Multiple segments of small and large bowel demonstrating mild wall thickening.  Findings could suggest an enteritis/colitis.  Clinical correlation for the same recommended.      Electronically signed by: REI HUIZAR MD  Date:     10/28/17  Time:    14:47              Narrative:    Exam: CT abdomen pelvis without contrast    Technique: Axial imaging of the abdomen and pelvis performed without IV contrast.  Coronal and sagittal reformats.    Reason: Abdominal pain    Comparison: CTA abdomen 9/21/17    Findings:    Beam hardening artifact is present from the patient's arms draped over her body as well as numerous radiodense metallic monitoring leads.   There is abundant motion artifact.    Calcified granulomas are noted in the right lung base.  Lung bases are otherwise clear without pleural fluid.    Visualized portions of the heart are enlarged.  No abnormal on the pericardial fluid is seen.    Multiple surgical clips and a radiodense staple line are noted in the region of the GE junction, similar to prior exam.  Prior bowel surgery is suspected due to altered anatomy seen on this scan, difficult to delineate in the absence of p.o. contrast.  Review of the [facility] medical record indicates the patient has Bilroth II anatomy.    The aorta is non-aneurysmal, with calcific atherosclerotic plaque along its course and branch vessels.    The gallbladder has been removed.  Dense Beam hardening artifact from metallic surgical clips obscures a large portion of the hepatic parenchyma.  There is a external percutaneous biliary drain to in place with its distal tip coiling in the duodenum.  The biliary tree does not appear dilated.  Hepatic parenchyma is homogenous.    The spleen, adrenal glands, and pancreas are unremarkable with noncontrast exam.  There is no evidence of bowel obstruction.  The numerous segments of small and large bowel demonstrate mild wall thickening.  Findings could suggest an enteritis/colitis.  Clinical correlation for the same recommended.  No abnormally enlarged lymph nodes are seen in the abdomen and pelvis.  No free air or free fluid is seen in the abdomen and pelvis.    Kidneys show no evidence of obstruction.  No evidence of nephrolithiasis.  Ill-defined subcentimeter focus of hyperattenuation is noted in the inferior pole the left kidney, similar to recent prior exam.  This finding is of questionable clinical significance.  Further evaluation with contrast-enhanced cross-sectional imaging on a non-emergent, outpatient basis could be obtained if indicated.  Urinary bladder is not well distended, limiting evaluation.  The uterus has been removed.  No  abnormal adnexal masses.    The osseous structures demonstrate age-appropriate degenerative changes of the spine, similar to prior exam.  No new acute fracture, dislocation, or osseous destructive process.  Levoscoliosis of the lumbar spine is again noted.                             X-Ray Chest AP Portable (Final result)  Result time 10/28/17 12:45:38    Final result by Rei Huizar MD (10/28/17 12:45:38)                 Impression:        Improvement of presumed cardiogenic pulmonary edema since last chest radiograph 9/21/17.      Electronically signed by: REI HUIZAR MD  Date:     10/28/17  Time:    12:45              Narrative:    CLINICAL HISTORY:  sepsis    TECHNIQUE: Single view portable frontal radiograph of the chest    Comparison: AP chest radiograph dated 9/21/17    Findings:    Support devices: A right IJ CVC terminates with its tip projecting near the cavoatrial junction, stable.  Cardiac monitoring leads project over the chest.    Chest: Cardiac silhouette is enlarged, stable.  Since the prior exam, there has been a decrease in the previously seen generalized increased interstitial markings, suggesting improvement in presumed cardiogenic pulmonary edema.  There are no new focal airspace opacities.  No pneumothorax or significant volume of pleural fluid is seen.  There are dense calcifications in the aortic arch.      Other: Osseous structures are intact.  Surgical staples project over the region of the xiphoid.  Linear metallic staple row projects over the left upper quadrant.  Partially visualized percutaneous biliary drain is seen in the right upper quadrant.                              Pending Diagnostic Studies:     None         Medications:  Reconciled Home Medications:   Current Discharge Medication List      START taking these medications    Details   doxycycline (VIBRAMYCIN) 100 MG Cap Take 1 capsule (100 mg total) by mouth every 12 (twelve) hours.  Qty: 10 capsule, Refills: 0     Associated Diagnoses: Septic shock         CONTINUE these medications which have NOT CHANGED    Details   carvedilol (COREG) 3.125 MG tablet Take 3.125 mg by mouth 2 (two) times daily.      docusate sodium (COLACE) 100 MG capsule Take 100 mg by mouth once daily.      fluticasone (FLONASE) 50 mcg/actuation nasal spray 1 spray by Each Nare route once daily.      hydrocodone-acetaminophen 5-325mg (NORCO) 5-325 mg per tablet Take 1 tablet by mouth every 6 (six) hours as needed for Pain.      ondansetron (ZOFRAN) 4 MG tablet Take 4 mg by mouth every 6 (six) hours as needed for Nausea.      pantoprazole (PROTONIX) 40 MG tablet Take 40 mg by mouth once daily.      promethazine (PHENERGAN) 25 MG tablet Take 1 tablet (25 mg total) by mouth daily as needed for Nausea.  Qty: 30 tablet, Refills: 3    Associated Diagnoses: Chronic nausea      ursodiol (ACTIGALL) 300 mg capsule Take 1 capsule (300 mg total) by mouth 2 (two) times daily.  Qty: 60 capsule, Refills: 11      acetaminophen (TYLENOL) 500 MG tablet Take 500 mg by mouth every 6 (six) hours as needed for Pain.      albuterol-ipratropium 2.5mg-0.5mg/3mL (DUO-NEB) 0.5 mg-3 mg(2.5 mg base)/3 mL nebulizer solution Take 3 mLs by nebulization every 6 (six) hours as needed for Wheezing. Rescue      aluminum & magnesium hydroxide-simethicone (MYLANTA MAX STRENGTH) 400-400-40 mg/5 mL suspension Take 20 mLs by mouth every 6 (six) hours as needed for Indigestion.      hydrOXYzine pamoate (VISTARIL) 25 MG Cap Take 1 capsule (25 mg total) by mouth nightly as needed.  Qty: 30 capsule, Refills: 3    Associated Diagnoses: Psychophysiological insomnia      loperamide (IMODIUM) 2 mg capsule Take 2 mg by mouth every 2 (two) hours as needed for Diarrhea.      simethicone (MYLICON) 80 MG chewable tablet Take 80 mg by mouth every 6 (six) hours as needed for Flatulence.      sucralfate (CARAFATE) 1 gram tablet Take 1 g by mouth 2 (two) times daily.         STOP taking these medications        ascorbic acid, vitamin C, (VITAMIN C) 500 MG tablet Comments:   Reason for Stopping:         aspirin (ECOTRIN) 81 MG EC tablet Comments:   Reason for Stopping:         cyanocobalamin (VITAMIN B-12) 1000 MCG tablet Comments:   Reason for Stopping:         ferrous sulfate 325 (65 FE) MG EC tablet Comments:   Reason for Stopping:         furosemide (LASIX) 40 MG tablet Comments:   Reason for Stopping:         lisinopril (PRINIVIL,ZESTRIL) 20 MG tablet Comments:   Reason for Stopping:         loratadine (CLARITIN) 10 mg tablet Comments:   Reason for Stopping:         potassium chloride SA (K-DUR,KLOR-CON) 10 MEQ tablet Comments:   Reason for Stopping:         tramadol (ULTRAM) 50 mg tablet Comments:   Reason for Stopping:         hyoscyamine (ANASPAZ,LEVSIN) 0.125 mg Tab Comments:   Reason for Stopping:         methocarbamol (ROBAXIN) 500 MG Tab Comments:   Reason for Stopping:               Indwelling Lines/Drains at time of discharge:   Lines/Drains/Airways     Central Venous Catheter Line                 Percutaneous Central Line Insertion/Assessment - single lumen  10/28/17 1213 right internal jugular 1 day          Drain                 Biliary Tube RUQ -- days         Closed/Suction Drain 09/22/17 1540 Right RLQ Other (Comment) 8 Fr. 37 days         Urethral Catheter 10/28/17 1444 Straight-tip 16 Fr. 1 day                Time spent on the discharge of patient: 45 minutes  Patient was seen and examined on the date of discharge and determined to be suitable for discharge.         Dafne Pink PA-C  Department of Hospital Medicine  [facility]

## 2017-10-30 NOTE — ASSESSMENT & PLAN NOTE
DNR,   Patient is DNR at nursing home and confirms that she would like to remain DNR while hospitalized. Refused dialysis, labs.   Consulted Hospice for patient to return to [facility] with that service at discharge.

## 2017-10-30 NOTE — ASSESSMENT & PLAN NOTE
BP fluctuating but overall very low. Held home lisinopril and carvedilol due to hypotension. Resume low dose carvedilol with parameters.

## 2017-10-30 NOTE — ASSESSMENT & PLAN NOTE
Leukocytosis, Metabolic acidosis with normal anion gap and bicarbonate losses   BP fluctuating but still low overall.  Pt alert and talkative again today.  Blood culture NG x 2 days. No labs drawn as patient is comfort care only.  Pt had recent e. Coli bacteremia from cholangitis and biliary stricture.  Given 1.2L NS, vancomycin, and cefepime in ED.  Continue cefepime, day #3.  D/C IVF.  PRN pain and nausea medications.

## 2017-10-30 NOTE — ASSESSMENT & PLAN NOTE
Recent admission for upper GI bleeding, cholangitis and biliary stricture.  No elevation in LFTs. On daily PPI, Carafate BID and Actigall BID at NH which will be resumed today.  BID Famotidine

## 2017-10-30 NOTE — PROGRESS NOTES
[facility] Hospital Medicine  Progress Note    Patient Name: Jazmín Bishop  MRN: [id number]  Patient Class: IP- Inpatient   Admission Date: 10/28/2017  Length of Stay: 2 days  Attending Physician: Emory Yadav MD  Primary Care Provider: Luis Butcher MD        Subjective:     Principal Problem:Septic shock    HPI:  82 y.o. Female with hypertension, chronic systolic congestive heart failure, chronic atrial fibrillation (anticoagulated on warfarin), gastroesophageal reflux disease, osteoarthritis, history of pulmonary embolism, choledocholithiasis that was unable to be treated endoscopically (instead treated with ursodiol), vitamin B12 and iron deficiency anemia.  She lives at [facility] in [place], [place].  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  She never  and does not have any children.  Her primary care physician is Dr. Luis Sparrow.                She had an ERCP with sphincterotomy done at [facility] on 5/16/16 by Dr. 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  Biliary stones could not be removed due to the sharp angulation of the bile duct, which inhibited withdrawal of a balloon tipped catheter.  She was put on ursodiol and the plan was to repeat ERCP only if she had increasing cholestasis or cholangitis.                She returned to [facility] ED on 9/21/17 with 1 week of constant aching generalized abdominal pain, worst in the left upper quadrant and right lower quadrant, accompanied by fever, nausea, and vomiting.  She had coffee ground emesis noted.  She had recently been diagnosed with moderate ileus on 9/13/17 and was treated with bisacodyl and an enema.  She was found to have colitis, biliary pancreatitis, cholestasis, and sepsis (temperature 103.6 F, total bilirubin 2.7, , , alkaline phosphatase  403, lipase >1000, abdominal CT showing colitis and worsening intrahepatic and extrahepatic biliary dilatation.  She was given cefepime, vancomycin, acetaminophen, morphine, fentanyl, and 2.5 liters of normal saline.  She was admitted to [facility] with a Gastroenterology consult.     While in the ED on 9/21/2017, her blood pressure decreased to 60s/30s requiring placement of a right internal jugular triple lumen central venous catheter and initiation of norepinephrine infusion.  Cefepime and ursodiol were continued as she awaited Gastroenterology.  Blood culture grew Gram negative rods.  Gastroenterology consulted Interventional Radiology to perform percutaneous transhepatic cholangiography and drain placement.  There were no stones but there was resistance at the ampulla suggesting a biliary stricture.  Sepsis and liver enzymes rapidly improved afterward.  She required furosemide after getting too much fluids, and diuresed well.  Blood culture grew E coli and biliary drain culture grew Enterococcus.  She was put on amoxicillin-clavulanate for another 7 days and discharged back to the [facility] on 9/25/17 with the drain in place.  She  followed up with Gastroenterology on 10/19 and plans were made for repeat ERCP and drain removal on 11/13/2017.      Pt presents to [facility] on 10/28/2017 from nursing home who sent her here because she has not been eating or drinking for a few days and they are concerned about her INR level which was elevated at 3.93 on 10/16/2017.  They were unable to collect any blood from her as she is a difficult stick and is now dehydrated.  Work-up in ED: Severely hypotensive with SBP in 60s. Elevated , Cr 7.8, Potassium 7.4 shifted down to 5.7 by ED MD.   Pt has elevated WBC 16K, venous blood gas pH 7.1 with decreased pCO2 (and plasma CO2) and decreased HCO3.  There is no elevation in lactic acid but procalcitonin is elevated at 3; troponin slightly elevated 0.065  "and  which is lower than previous hospitalizations.      Admitting to [facility] for initiation of Hospice with comfort care measures here. Pt confirms that she does NOT want to go to ICU for medication to increase her blood pressure and she does NOT want dialysis.  She states "just keep me comfortable".  Dr. Ordonez, ED MD, spoke to MELISA West, pt's POA, and confirmed her DNR status and desire for comfort care only.      Hospital Course:  Pt awake and alert since 10/29/2017.  States she wants to go home and not return to the hospital anymore.  She does not want any lab work done.      Interval History: Pt states that she wants to eat regular food today.  Denies CP, SOB.  + multiple BMs.      Review of Systems   Constitutional: Positive for fatigue. Negative for chills and fever.   Respiratory: Negative for cough and shortness of breath.    Cardiovascular: Negative for chest pain and palpitations.   Gastrointestinal: Positive for diarrhea. Negative for nausea and vomiting.   Neurological: Positive for weakness. Negative for dizziness and headaches.     Objective:     Vital Signs (Most Recent):  Temp: 98.1 °F (36.7 °C) (10/30/17 0822)  Pulse: 86 (10/30/17 0822)  Resp: 19 (10/30/17 0822)  BP: (!) 104/56 (10/30/17 0822)  SpO2: 97 % (10/30/17 0732) Vital Signs (24h Range):  Temp:  [97.7 °F (36.5 °C)-98.6 °F (37 °C)] 98.1 °F (36.7 °C)  Pulse:  [] 86  Resp:  [19-20] 19  SpO2:  [97 %-100 %] 97 %  BP: ()/(45-63) 104/56     Weight: 40.2 kg (88 lb 10 oz)  Body mass index is 18.52 kg/m².    Intake/Output Summary (Last 24 hours) at 10/30/17 0851  Last data filed at 10/30/17 0600   Gross per 24 hour   Intake             1200 ml   Output             1900 ml   Net             -700 ml      Physical Exam   Constitutional: No distress.   Thin   Cardiovascular:   No murmur heard.  Irregularly irregular rhythm    Pulmonary/Chest: Effort normal and breath sounds normal. No respiratory distress. She has no " wheezes.   Abdominal: Soft. There is no tenderness.   Musculoskeletal: Normal range of motion. She exhibits no edema.   Neurological: She is alert.   Oriented to person and place.     Psychiatric: She has a normal mood and affect.   Nursing note and vitals reviewed.      Significant Labs:   Blood Culture:   Recent Labs  Lab 10/28/17  1219   LABBLOO No Growth to date  No Growth to date       Significant Imaging: no new    Assessment/Plan:      * Septic shock    Leukocytosis, Metabolic acidosis with normal anion gap and bicarbonate losses   BP fluctuating but still low overall.  Pt alert and talkative again today.  Blood culture NG x 2 days. No labs drawn as patient is comfort care only.  Pt had recent e. Coli bacteremia from cholangitis and biliary stricture.  Given 1.2L NS, vancomycin, and cefepime in ED.  Continue cefepime, day #3.   D/C IVF.  PRN pain and nausea medications.           Acute renal failure    Uremia, acute hyperkalemia  Good urine output.  No repeat labs as pt is comfort care only and does not want dialysis.  , Cr 7.8 and K (after shifting in ED) 5.7.  Baseline creatinine ~1.0 with last baseline reading in [facility] on 9/25/2017.  NH staff reports poor PO intake and pt receiving lisinopril, lasix, potassium including this morning.  Significant hypotension likely contributing to renal failure too.  No evidence of infection on urinalysis.  D/C IVF.         Chronic diastolic heart failure    Biatrial enlargement, Pulmonary hypertension,   Appears better compensated today.  . CXR shows improvement since last admission.  Holding carvedilol, lasix, and lisinopril due to hypotension.  Continue to monitor.          Coronary artery disease due to lipid rich plaque    Denies CP.  Pt on ASA at NH which will be held here for now.            Essential hypertension    BP fluctuating but overall very low. Hold home lisinopril and carvedilol due to hypotension.  Monitor.          Persistent atrial  fibrillation    Current use of long term anticoagulation  HR fluctuating.  Coumadin 5 mg currently on hold NH after supra-therapeutic INR at 3.9 on 10/16/17.  INR 2.0 on admission. Continue hold on Coumadin as pt is being discharged with hospice.  Monitor on telemetry.            History of Billroth II operation    Recent admission for upper GI bleeding, cholangitis and biliary stricture.  No elevation in LFTs. On daily PPI, Carafate BID and Actigall BID at NH which will be resumed today.  BID Famotidine          Iron deficiency anemia secondary to inadequate dietary iron intake    Chronic, stable. Holding iron supplement.          Nursing home resident    DNR,   Patient is DNR at nursing home and confirms that she would like to remain DNR while hospitalized. Consulted Hospice for patient to return to [facility] with that service at discharge.             VTE Risk Mitigation         Ordered     High Risk of VTE  Once      10/28/17 1837     Reason for No Pharmacological VTE Prophylaxis  Once      10/28/17 1837              Dafne Pink PA-C  Department of Hospital Medicine   [facility]

## 2017-10-30 NOTE — ASSESSMENT & PLAN NOTE
Uremia, acute hyperkalemia  Good urine output.  No repeat labs as pt is comfort care only and does not want dialysis.  , Cr 7.8 and K (after shifting in ED) 5.7.  Baseline creatinine ~1.0 with last baseline reading in EPIC on 9/25/2017.  NH staff reports poor PO intake and pt receiving regular home meds.  Significant hypotension likely contributing to renal failure too.  No evidence of infection on urinalysis.

## 2017-10-30 NOTE — PLAN OF CARE
Patient to return to [facility] via their WC van pickup for 1-2pm today.  TN notified Shaina dang to call report (as per Dayday dang) to  wing 1 room 117 B.  Patient to start [facility].  Follow-up With  Details  Why  Contact Info   Luis Butcher MD  In 1 week  hospital follow-up  [address] [facility] [id number] [phone]   [facility]      [address]  [address]  [facility] [address]  [phone]          10/30/17 1201   Final Note   Assessment Type Final Discharge Note   Discharge Disposition [facility]   What phone number can be called within the next 1-3 days to see how you are doing after discharge? [phone]   Hospital Follow Up  Appt(s) scheduled? No   Discharge plans and expectations educations in teach back method with documentation complete? Yes   Right Care Referral Info   Post Acute Recommendation Other

## 2017-10-30 NOTE — SUBJECTIVE & OBJECTIVE
Interval History: Pt states that she wants to eat regular food today.  Denies CP, SOB.  + multiple BMs.      Review of Systems   Constitutional: Positive for fatigue. Negative for chills and fever.   Respiratory: Negative for cough and shortness of breath.    Cardiovascular: Negative for chest pain and palpitations.   Gastrointestinal: Positive for diarrhea. Negative for nausea and vomiting.   Neurological: Positive for weakness. Negative for dizziness and headaches.     Objective:     Vital Signs (Most Recent):  Temp: 98.1 °F (36.7 °C) (10/30/17 0822)  Pulse: 86 (10/30/17 0822)  Resp: 19 (10/30/17 0822)  BP: (!) 104/56 (10/30/17 0822)  SpO2: 97 % (10/30/17 0732) Vital Signs (24h Range):  Temp:  [97.7 °F (36.5 °C)-98.6 °F (37 °C)] 98.1 °F (36.7 °C)  Pulse:  [] 86  Resp:  [19-20] 19  SpO2:  [97 %-100 %] 97 %  BP: ()/(45-63) 104/56     Weight: 40.2 kg (88 lb 10 oz)  Body mass index is 18.52 kg/m².    Intake/Output Summary (Last 24 hours) at 10/30/17 0851  Last data filed at 10/30/17 0600   Gross per 24 hour   Intake             1200 ml   Output             1900 ml   Net             -700 ml      Physical Exam   Constitutional: No distress.   Thin   Cardiovascular:   No murmur heard.  Irregularly irregular rhythm    Pulmonary/Chest: Effort normal and breath sounds normal. No respiratory distress. She has no wheezes.   Abdominal: Soft. There is no tenderness.   Musculoskeletal: Normal range of motion. She exhibits no edema.   Neurological: She is alert.   Oriented to person and place.     Psychiatric: She has a normal mood and affect.   Nursing note and vitals reviewed.      Significant Labs:   Blood Culture:   Recent Labs  Lab 10/28/17  1219   LABBLOO No Growth to date  No Growth to date       Significant Imaging: no new

## 2017-10-30 NOTE — PROGRESS NOTES
TN spoke with MELISA West ( [phone]) to inform him of DC to [facility], TN left VM for Dayday at [facility] that patient can return via WC Van, TN notified Sasha at [facility] that patient will return today, TN gave Sasha the POA #, JEFF Merritt stated patient able to sit independently.

## 2017-10-30 NOTE — PLAN OF CARE
Problem: Patient Care Overview  Goal: Plan of Care Review  Turned Q 2 hour and repositioned. IV fluids continues. Steve intact. Will continue to monitor.

## 2017-10-30 NOTE — ASSESSMENT & PLAN NOTE
Current use of long term anticoagulation  HR fluctuating.  Coumadin 5 mg currently on hold NH after supra-therapeutic INR at 3.9 on 10/16/17.  INR 2.0 on admission. Continue hold on Coumadin as pt is being discharged with hospice.

## 2017-10-30 NOTE — ASSESSMENT & PLAN NOTE
Recent admission for upper GI bleeding, cholangitis and biliary stricture.  No elevation in LFTs. On daily PPI, Carafate BID and Actigall BID at NH which may be resumed.

## 2017-10-30 NOTE — ASSESSMENT & PLAN NOTE
Biatrial enlargement, Pulmonary hypertension,   Appears compensated today.   on admission. CXR shows improvement since last admission.  Holding carvedilol, lasix, and lisinopril due to hypotension.

## 2017-10-30 NOTE — ASSESSMENT & PLAN NOTE
BP fluctuating but overall very low. Hold home lisinopril and carvedilol due to hypotension.  Monitor.

## 2017-10-30 NOTE — ASSESSMENT & PLAN NOTE
Uremia, acute hyperkalemia  Good urine output.  No repeat labs as pt is comfort care only and does not want dialysis.  , Cr 7.8 and K (after shifting in ED) 5.7.  Baseline creatinine ~1.0 with last baseline reading in EPIC on 9/25/2017.  NH staff reports poor PO intake and pt receiving lisinopril, lasix, potassium including this morning.  Significant hypotension likely contributing to renal failure too.  No evidence of infection on urinalysis.  D/C IVF.

## 2017-10-30 NOTE — ASSESSMENT & PLAN NOTE
Leukocytosis, Metabolic acidosis with normal anion gap and bicarbonate losses   BP fluctuating but still low overall.  Pt alert and talkative again today.  Blood culture NG x 2 days. Urine culture NG. Afebrile.  No labs drawn as patient is comfort care only.  Pt had recent e. Coli bacteremia from cholangitis and biliary stricture.  Given 1.2L NS, vancomycin, and cefepime in ED. Received cefepime x 3 days.  Discharging on Doxycycline through 11/3/2017.  PRN pain and nausea medications.

## 2017-10-30 NOTE — ASSESSMENT & PLAN NOTE
DNR,   Patient is DNR at nursing home and confirms that she would like to remain DNR while hospitalized. Consulted Hospice for patient to return to [facility] with that service at discharge.

## 2017-10-30 NOTE — PLAN OF CARE
Patient d/c back to living facility, d/c instructions given to Marce, the nurse at the facility, she verbalized understanding.  IV removed, tip intact. Waiting for transport.

## 2017-11-01 ENCOUNTER — OUTSIDE PLACE OF SERVICE (OUTPATIENT)
Dept: ADMINISTRATIVE | Facility: OTHER | Age: 82
End: 2017-11-01
Payer: MEDICARE

## 2017-11-01 PROCEDURE — 99499 UNLISTED E&M SERVICE: CPT | Mod: ,,, | Performed by: FAMILY MEDICINE

## 2017-11-02 LAB — BACTERIA BLD CULT: NORMAL

## 2017-11-02 NOTE — PHYSICIAN QUERY
PT Name: Jazmín Bishop  MR #: [id number]     Physician Query Form - Documentation Clarification      CDS/: Mikal Guthrie RN              Contact information:[phone]    This form is a permanent document in the medical record.     Query Date: November 2, 2017    By submitting this query, we are merely seeking further clarification of documentation. Please utilize your independent clinical judgment when addressing the question(s) below.    The Medical record reflects the following:    Supporting Clinical Findings Location in Medical Record   Chronic systolic congestive heart failure      Chronic diastolic heart failure Biatrial enlargement, Pulmonary hypertension,     . Pt appears dehydrated. CXR shows improvement since last admission. Holding carvedilol, lasix, and lisinopril due to hypotension - pt received all this morning at NH. Continue to monitor.     Home medications: Furosemide 40 mg oral daily     H&P 10/28,   Hosp PN 10/29  Hosp PN 10/30 and   DC summary 10/30                H&P 10/28   Chronic diastolic heart failure   2D Echo 9/22/2017: EF 50%, moderate MVR, PAP 53, Severe TVR, biatrial enlargement     I performed a limited bedside echocardiogram on the patient.  There is no pericardial effusion.  There was no severe depressed cardiac function     PA Care Plan 10/29        ED MD note 10/28                                                                            Doctor, Please specify diagnosis or diagnoses associated with above clinical findings.    Please clarify the type of CHF treated during this visit    Provider Use Only    [  ] Chronic Systolic Heart Failure    [ x ] Chronic Diastolic Heart Failure    [  ] Chronic Combined Systolic and Diastolic Heart failure    [  ] Other heart failure Conditions(Specify)________________________                                                                                                             [  ] Clinically undetermined

## 2017-11-10 ENCOUNTER — TELEPHONE (OUTPATIENT)
Dept: GASTROENTEROLOGY | Facility: CLINIC | Age: 82
End: 2017-11-10

## 2017-11-10 NOTE — TELEPHONE ENCOUNTER
----- Message from Eden Cuevas sent at 11/9/2017  3:09 PM CST -----  Contact: ms monet with [facility] [phone] ext [phone]  Ms monet would like to know if patient has to prep for her procedure

## 2017-11-13 ENCOUNTER — HOSPITAL ENCOUNTER (OUTPATIENT)
Facility: HOSPITAL | Age: 82
Discharge: ANOTHER HOSPITAL | End: 2017-11-13
Attending: INTERNAL MEDICINE | Admitting: INTERNAL MEDICINE
Payer: MEDICARE

## 2017-11-13 ENCOUNTER — ANESTHESIA EVENT (OUTPATIENT)
Dept: ENDOSCOPY | Facility: HOSPITAL | Age: 82
End: 2017-11-13
Payer: MEDICARE

## 2017-11-13 ENCOUNTER — ANESTHESIA (OUTPATIENT)
Dept: ENDOSCOPY | Facility: HOSPITAL | Age: 82
End: 2017-11-13
Payer: MEDICARE

## 2017-11-13 ENCOUNTER — SURGERY (OUTPATIENT)
Age: 82
End: 2017-11-13

## 2017-11-13 VITALS
SYSTOLIC BLOOD PRESSURE: 131 MMHG | TEMPERATURE: 98 F | HEIGHT: 58 IN | WEIGHT: 86 LBS | BODY MASS INDEX: 18.05 KG/M2 | RESPIRATION RATE: 20 BRPM | HEART RATE: 89 BPM | DIASTOLIC BLOOD PRESSURE: 74 MMHG | OXYGEN SATURATION: 97 %

## 2017-11-13 DIAGNOSIS — K80.50 INTRAHEPATIC BILE DUCT STONES: ICD-10-CM

## 2017-11-13 LAB
ANION GAP SERPL CALC-SCNC: 6 MMOL/L
APTT BLDCRRT: 30 SEC
BUN SERPL-MCNC: 7 MG/DL
CALCIUM SERPL-MCNC: 7.6 MG/DL
CHLORIDE SERPL-SCNC: 112 MMOL/L
CO2 SERPL-SCNC: 21 MMOL/L
CREAT SERPL-MCNC: 0.6 MG/DL
EST. GFR  (AFRICAN AMERICAN): >60 ML/MIN/1.73 M^2
EST. GFR  (NON AFRICAN AMERICAN): >60 ML/MIN/1.73 M^2
GLUCOSE SERPL-MCNC: 97 MG/DL
INR PPP: 1.2
POTASSIUM SERPL-SCNC: 3.9 MMOL/L
PROTHROMBIN TIME: 12.5 SEC
SODIUM SERPL-SCNC: 139 MMOL/L

## 2017-11-13 PROCEDURE — 85610 PROTHROMBIN TIME: CPT

## 2017-11-13 PROCEDURE — 43264 ERCP REMOVE DUCT CALCULI: CPT | Mod: 51,GV,, | Performed by: INTERNAL MEDICINE

## 2017-11-13 PROCEDURE — 37000008 HC ANESTHESIA 1ST 15 MINUTES: Performed by: INTERNAL MEDICINE

## 2017-11-13 PROCEDURE — 27202125 HC BALLOON, EXTRACTION (ANY): Performed by: INTERNAL MEDICINE

## 2017-11-13 PROCEDURE — 27202127 HC STENT INTRODUCER: Performed by: INTERNAL MEDICINE

## 2017-11-13 PROCEDURE — 80048 BASIC METABOLIC PNL TOTAL CA: CPT

## 2017-11-13 PROCEDURE — 43276 ERCP STENT EXCHANGE W/DILATE: CPT | Performed by: INTERNAL MEDICINE

## 2017-11-13 PROCEDURE — 25000003 PHARM REV CODE 250: Performed by: INTERNAL MEDICINE

## 2017-11-13 PROCEDURE — 43274 ERCP DUCT STENT PLACEMENT: CPT | Mod: 59,GV,, | Performed by: INTERNAL MEDICINE

## 2017-11-13 PROCEDURE — 63600175 PHARM REV CODE 636 W HCPCS: Performed by: ANESTHESIOLOGY

## 2017-11-13 PROCEDURE — 43264 ERCP REMOVE DUCT CALCULI: CPT | Performed by: INTERNAL MEDICINE

## 2017-11-13 PROCEDURE — C1769 GUIDE WIRE: HCPCS | Performed by: INTERNAL MEDICINE

## 2017-11-13 PROCEDURE — 37000009 HC ANESTHESIA EA ADD 15 MINS: Performed by: INTERNAL MEDICINE

## 2017-11-13 PROCEDURE — 85730 THROMBOPLASTIN TIME PARTIAL: CPT

## 2017-11-13 PROCEDURE — 43276 ERCP STENT EXCHANGE W/DILATE: CPT | Mod: GV,,, | Performed by: INTERNAL MEDICINE

## 2017-11-13 PROCEDURE — 36415 COLL VENOUS BLD VENIPUNCTURE: CPT

## 2017-11-13 PROCEDURE — 25000003 PHARM REV CODE 250: Performed by: NURSE ANESTHETIST, CERTIFIED REGISTERED

## 2017-11-13 PROCEDURE — 63600175 PHARM REV CODE 636 W HCPCS: Performed by: NURSE ANESTHETIST, CERTIFIED REGISTERED

## 2017-11-13 PROCEDURE — C2617 STENT, NON-COR, TEM W/O DEL: HCPCS | Performed by: INTERNAL MEDICINE

## 2017-11-13 RX ORDER — SUCCINYLCHOLINE CHLORIDE 20 MG/ML
INJECTION INTRAMUSCULAR; INTRAVENOUS
Status: DISCONTINUED | OUTPATIENT
Start: 2017-11-13 | End: 2017-11-13

## 2017-11-13 RX ORDER — FENTANYL CITRATE 50 UG/ML
INJECTION, SOLUTION INTRAMUSCULAR; INTRAVENOUS
Status: DISCONTINUED | OUTPATIENT
Start: 2017-11-13 | End: 2017-11-13

## 2017-11-13 RX ORDER — ETOMIDATE 2 MG/ML
INJECTION INTRAVENOUS
Status: DISCONTINUED | OUTPATIENT
Start: 2017-11-13 | End: 2017-11-13

## 2017-11-13 RX ORDER — SODIUM CHLORIDE 9 MG/ML
INJECTION, SOLUTION INTRAVENOUS CONTINUOUS
Status: DISCONTINUED | OUTPATIENT
Start: 2017-11-13 | End: 2017-11-13 | Stop reason: HOSPADM

## 2017-11-13 RX ORDER — ONDANSETRON 2 MG/ML
4 INJECTION INTRAMUSCULAR; INTRAVENOUS DAILY PRN
Status: DISCONTINUED | OUTPATIENT
Start: 2017-11-13 | End: 2017-11-13 | Stop reason: HOSPADM

## 2017-11-13 RX ORDER — PROPOFOL 10 MG/ML
VIAL (ML) INTRAVENOUS
Status: DISCONTINUED | OUTPATIENT
Start: 2017-11-13 | End: 2017-11-13

## 2017-11-13 RX ORDER — SODIUM CHLORIDE 0.9 % (FLUSH) 0.9 %
3 SYRINGE (ML) INJECTION
Status: DISCONTINUED | OUTPATIENT
Start: 2017-11-13 | End: 2017-11-13 | Stop reason: HOSPADM

## 2017-11-13 RX ORDER — HYDROMORPHONE HYDROCHLORIDE 2 MG/ML
0.2 INJECTION, SOLUTION INTRAMUSCULAR; INTRAVENOUS; SUBCUTANEOUS EVERY 5 MIN PRN
Status: DISPENSED | OUTPATIENT
Start: 2017-11-13 | End: 2017-11-13

## 2017-11-13 RX ADMIN — PROPOFOL 20 MG: 10 INJECTION, EMULSION INTRAVENOUS at 10:11

## 2017-11-13 RX ADMIN — HYDROMORPHONE HYDROCHLORIDE 0.2 MG: 2 INJECTION, SOLUTION INTRAMUSCULAR; INTRAVENOUS; SUBCUTANEOUS at 10:11

## 2017-11-13 RX ADMIN — ETOMIDATE 10 MG: 2 INJECTION, SOLUTION INTRAVENOUS at 09:11

## 2017-11-13 RX ADMIN — FENTANYL CITRATE 50 MCG: 50 INJECTION, SOLUTION INTRAMUSCULAR; INTRAVENOUS at 09:11

## 2017-11-13 RX ADMIN — SODIUM CHLORIDE: 0.9 INJECTION, SOLUTION INTRAVENOUS at 09:11

## 2017-11-13 RX ADMIN — SUCCINYLCHOLINE CHLORIDE 100 MG: 20 INJECTION, SOLUTION INTRAMUSCULAR; INTRAVENOUS at 09:11

## 2017-11-13 NOTE — ANESTHESIA PREPROCEDURE EVALUATION
11/13/2017  Jazmín Bishop is a 82 y.o., female for ERCP under GETA. Pt had biliary sepsis 9/22/17 and  percutaneous drainage in IR under [facility].    PRIOR ANES (in Epic)   2016-05-16 ERCP GA   [mid 2, fent 50, etom 12]   [sevo, <->70]   [no nask vent; Miler#2, Grade I view]    ANES-RELATED MED/SURG  Patient Active Problem List   Diagnosis    Essential hypertension    OA (osteoarthritis)    Mitral valve prolapse    Pulmonary hypertension    Coronary artery disease due to lipid rich plaque    MR (mitral regurgitation)    PVC's (premature ventricular contractions)    Chronic diastolic heart failure    Persistent atrial fibrillation    Iron deficiency anemia secondary to inadequate dietary iron intake    Nursing home resident    Biatrial enlargement    History of pulmonary embolism    DNR (do not resuscitate)    Current use of long term anticoagulation    E coli bacteremia    History of Billroth II operation    Biliary stricture    Acute renal failure    Septic shock    Uremia due to inadequate renal perfusion    Acute hyperkalemia    Metabolic acidosis with normal anion gap and bicarbonate losses    Leukocytosis    Comfort measures only status    Intrahepatic bile duct stones     Past Medical History:   Diagnosis Date    A-fib     Arthritis     Cataract     Chicken pox     Choledocholithiasis 05/16/2016    Chronic diastolic congestive heart failure     Constipation     E coli bacteremia 09/22/2017    due to biliary stricture and cholangitis    GERD (gastroesophageal reflux disease)     Hypertension     Pancytopenia 2/6/2017    Pneumonia     Polyosteoarthritis     Polyosteoarthritis     Pulmonary embolism     Ulcer      Past Surgical History:   Procedure Laterality Date    CHOLECYSTECTOMY      ERCP W/ SPHICTEROTOMY  05/16/2016    EYE SURGERY       HYSTERECTOMY      Pylorplasty      SMALL INTESTINE SURGERY      SPINE SURGERY      x2    STOMACH SURGERY       ALLERGIES  Review of patient's allergies indicates:   Allergen Reactions    Codeine sulfate Other (See Comments)     CONFUSION       ANES-RELATED MAR 2017-09-22  cefipime  pantoprazole X 1  vancomycin  ursodiol      Anesthesia Evaluation     I have reviewed the Nursing Notes.   I have reviewed the Medications.     Review of Systems  Anesthesia Hx:  No problems with previous Anesthesia  Denies Personal Hx of Anesthesia complications.   Social:  Non-Smoker, No Alcohol Use    Hematology/Oncology:         -- Anemia:   Cardiovascular:   Exercise tolerance: poor Hypertension CAD  Dysrhythmias (rate controlled ) atrial fibrillation CHF (HFpEF) ECG has been reviewed.    Pulmonary:   Shortness of breath (Bilateral pleural effusions) Pulm HTN PAS   Renal/:   Chronic Renal Disease, CRI    Hepatic/GI:   GERD    Musculoskeletal:   Arthritis     Neurological:  Neurology Normal    Endocrine:  Endocrine Normal      Wt Readings from Last 1 Encounters:   11/13/17 39 kg (86 lb)     Temp Readings from Last 1 Encounters:   11/13/17 36.7 °C (98 °F)     BP Readings from Last 1 Encounters:   11/13/17 135/73     Pulse Readings from Last 1 Encounters:   11/13/17 83     SpO2 Readings from Last 1 Encounters:   11/13/17 97%       Physical Exam  General:  Cachexia    Airway/Jaw/Neck:  Mallampati: I Jaw/Neck Findings:  Neck ROM: Normal ROM  Neck Findings: Normal     Dental:  Dental Findings: Edentulous   Chest/Lungs:  Chest/Lungs Clear    Heart/Vascular:  Heart Findings: Normal       Mental Status:  Mental Status Findings:  Alert and Oriented       Lab Results   Component Value Date    WBC 16.27 (H) 10/28/2017    HGB 11.1 (L) 10/28/2017    HCT 43 10/28/2017    MCV 91 10/28/2017     10/28/2017       Chemistry        Component Value Date/Time     11/13/2017 0832    K 3.9 11/13/2017 0832     (H) 11/13/2017 0832     CO2 21 (L) 11/13/2017 0832    BUN 7 (L) 11/13/2017 0832    CREATININE 0.6 11/13/2017 0832    GLU 97 11/13/2017 0832        Component Value Date/Time    CALCIUM 7.6 (L) 11/13/2017 0832    ALKPHOS 139 (H) 10/28/2017 1159    AST 15 10/28/2017 1159    ALT 9 (L) 10/28/2017 1159    BILITOT 0.7 10/28/2017 1159    ESTGFRAFRICA >60 11/13/2017 0832    EGFRNONAA >60 11/13/2017 0832            Lab Results   Component Value Date    ALBUMIN 2.1 (L) 10/28/2017      Lab Results   Component Value Date    TSH 6.326 (H) 10/28/2017       CONCLUSIONS ECHO    1 - Low normal to mildly depressed left ventricular systolic function (EF 50-55%).     2 - Biatrial enlargement.     3 - Concentric remodeling.     4 - Pulmonary hypertension. The estimated PA systolic pressure is 53 mmHg.     5 - Moderate mitral regurgitation.     6 - Severe tricuspid regurgitation.   This document has been electronically    SIGNED BY: Efren Weir MD On: 09/22/2017 16:18      Anesthesia Plan  Type of Anesthesia, risks & benefits discussed:  Anesthesia Type:  general  Patient's Preference:   Intra-op Monitoring Plan:   Intra-op Monitoring Plan Comments:   Post Op Pain Control Plan:   Post Op Pain Control Plan Comments:   Induction:   IV  Beta Blocker:  Patient is on a Beta-Blocker and has not received dose within the past 24 hours due to non-compliance or for other reasons (Patient should receive a perioperative dose or document why it is withheld).     Beta Blocker Comments: Held by nursing home  Informed Consent: Patient understands risks and agrees with Anesthesia plan.  Questions answered. Anesthesia consent signed with patient.  ASA Score: 3     Day of Surgery Review of History & Physical:            Ready For Surgery From Anesthesia Perspective.

## 2017-11-13 NOTE — DISCHARGE INSTRUCTIONS
Post ERCP Discharge Instructions:     Jazmín Bishop  11/13/2017  Mike Nuñez MD    1. Do Not eat or drink anything for 1 hour. Try sips of water first. If tolerated, resume your regular diet or one recommended by your physician.  2. Do not drive, or operate machinery, make critical decisions, or do activities that require coordination or balance for 24 hours.  3. You may experience a sore throat for 24 to 48 hours. You may use throat lozenges or gargle with warm salt water to relieve the discomfort.  4. Because air was put into your stomach during the procedure, you may experience some belching.  5. Go directly to the emergency room if you notice any of the following:                              *Chills and/or fever over 101                *Persistent vomiting or vomiting with blood                *Severe abdominal pain, other than gas cramps                *Severe chest pain                *Black, tarry stools    If you have any questions or problems, please call your Physician:    Mike Nuñez MD Phone: ***    Lab Results: [phone]    If a complication or emergency situation arises and you are unable to reach your Physician - GO TO THE EMERGENCY ROOM.

## 2017-11-13 NOTE — H&P
Short Stay Endoscopy History and Physical    PCP - Luis Butcher MD  Referring Physician - Elizabeth Allen MD  [address] [facility]  [address]  [place] [address]    Procedure - ercp  ASA - per anesthesia  Mallampati - per anesthesia  History of Anesthesia problems - no  Family history Anesthesia problems -  no   Plan of anesthesia - General    HPI:  This is a 82 y.o. female here for evaluation of: bile duct stones    Reflux - no  Dysphagia - no  Abdominal pain - no  Diarrhea - no    ROS:  Constitutional: No fevers, chills, No weight loss  CV: No chest pain  Pulm: No cough, No shortness of breath  Ophtho: No vision changes  GI: see HPI  Derm: No rash    Medical History:  has a past medical history of A-fib; Arthritis; Cataract; Chicken pox; Choledocholithiasis (05/16/2016); Chronic diastolic congestive heart failure; Constipation; E coli bacteremia (09/22/2017); GERD (gastroesophageal reflux disease); Hypertension; Pancytopenia (2/6/2017); Pneumonia; Polyosteoarthritis; Polyosteoarthritis; Pulmonary embolism; and Ulcer.    Surgical History:  has a past surgical history that includes Pylorplasty; Hysterectomy; Cholecystectomy; Small intestine surgery; Stomach surgery; Eye surgery; Spine surgery; and ERCP w/ sphicterotomy (05/16/2016).    Family History: family history includes Kidney disease in her mother; No Known Problems in her father..    Social History:  reports that she has never smoked. She has never used smokeless tobacco. She reports that she does not drink alcohol or use drugs.    Review of patient's allergies indicates:   Allergen Reactions    Codeine sulfate Other (See Comments)     CONFUSION         Medications:   Prescriptions Prior to Admission   Medication Sig Dispense Refill Last Dose    acetaminophen (TYLENOL) 500 MG tablet Take 500 mg by mouth every 6 (six) hours as needed for Pain.   Past Week at Unknown time    albuterol-ipratropium 2.5mg-0.5mg/3mL (DUO-NEB) 0.5 mg-3 mg(2.5 mg base)/3  mL nebulizer solution Take 3 mLs by nebulization every 6 (six) hours as needed for Wheezing. Rescue   Past Week at Unknown time    aluminum & magnesium hydroxide-simethicone (MYLANTA MAX STRENGTH) 400-400-40 mg/5 mL suspension Take 20 mLs by mouth every 6 (six) hours as needed for Indigestion.   Past Month at Unknown time    carvedilol (COREG) 3.125 MG tablet Take 3.125 mg by mouth 2 (two) times daily.   Past Week at Unknown time    hydrOXYzine pamoate (VISTARIL) 25 MG Cap Take 1 capsule (25 mg total) by mouth nightly as needed. 30 capsule 3 Past Month at Unknown time    loperamide (IMODIUM) 2 mg capsule Take 2 mg by mouth every 2 (two) hours as needed for Diarrhea.   Past Month at Unknown time    pantoprazole (PROTONIX) 40 MG tablet Take 40 mg by mouth once daily.   11/12/2017 at 0600    sucralfate (CARAFATE) 1 gram tablet Take 1 g by mouth 2 (two) times daily.   11/12/2017 at 1800    ursodiol (ACTIGALL) 300 mg capsule Take 1 capsule (300 mg total) by mouth 2 (two) times daily. 60 capsule 11 11/12/2017 at 1800    docusate sodium (COLACE) 100 MG capsule Take 100 mg by mouth once daily.   11/11/2017    fluticasone (FLONASE) 50 mcg/actuation nasal spray 1 spray by Each Nare route once daily.   10/28/2017 at Unknown time    hydrocodone-acetaminophen 5-325mg (NORCO) 5-325 mg per tablet Take 1 tablet by mouth every 6 (six) hours as needed for Pain.   11/7/2017    ondansetron (ZOFRAN) 4 MG tablet Take 4 mg by mouth every 6 (six) hours as needed for Nausea.   11/1/2017    promethazine (PHENERGAN) 25 MG tablet Take 1 tablet (25 mg total) by mouth daily as needed for Nausea. 30 tablet 3 Past Week at Unknown time    simethicone (MYLICON) 80 MG chewable tablet Take 80 mg by mouth every 6 (six) hours as needed for Flatulence.   More than a month at Unknown time       Physical Exam:    Vital Signs:   Vitals:    11/13/17 0815   BP: 135/73   Pulse: 83   Resp: 20   Temp: 98 °F (36.7 °C)       General Appearance: Well  appearing in no acute distress  Eyes:    No scleral icterus  ENT: Neck supple  Lungs: CTA anteriorly  Heart:  Regular rate  Abdomen: Soft, non tender, non distended with normal, drain in place bowel sounds.  Extremities: No edema  Skin: No rash    Labs:  Lab Results   Component Value Date    WBC 16.27 (H) 10/28/2017    HGB 11.1 (L) 10/28/2017    HCT 43 10/28/2017     10/28/2017    CHOL 100 (L) 02/06/2017    TRIG 122 02/06/2017    HDL 25 (L) 02/06/2017    ALT 9 (L) 10/28/2017    AST 15 10/28/2017     11/13/2017    K 3.9 11/13/2017     (H) 11/13/2017    CREATININE 0.6 11/13/2017    BUN 7 (L) 11/13/2017    CO2 21 (L) 11/13/2017    TSH 6.326 (H) 10/28/2017    INR 1.2 11/13/2017    HGBA1C 4.6 02/06/2017       I have explained the risks and benefits of this endoscopic procedure to the patient including but not limited to bleeding, inflammation, infection, perforation, and death.      Mike Nuñez MD

## 2017-11-13 NOTE — ANESTHESIA POSTPROCEDURE EVALUATION
"Anesthesia Post Evaluation    Patient: Jazmín Bishop    Procedure(s) Performed: Procedure(s) (LRB):  ERCP (N/A)    Final Anesthesia Type: general  Patient location during evaluation: PACU  Patient participation: Yes- Able to Participate  Level of consciousness: awake and alert  Post-procedure vital signs: reviewed and stable  Pain management: adequate  Airway patency: patent  PONV status at discharge: No PONV  Anesthetic complications: no      Cardiovascular status: hemodynamically stable and blood pressure returned to baseline  Respiratory status: room air, unassisted and spontaneous ventilation  Hydration status: euvolemic  Follow-up not needed.        Visit Vitals  /74 (BP Location: Left arm, Patient Position: Sitting)   Pulse 89   Temp 36.7 °C (98 °F)   Resp 20   Ht 4' 10" (1.473 m)   Wt 39 kg (86 lb)   LMP  (LMP Unknown)   SpO2 97%   Breastfeeding? No   BMI 17.97 kg/m²       Pain/Steve Score: Pain Assessment Performed: Yes (11/13/2017 11:05 AM)  Presence of Pain: denies (11/13/2017 11:05 AM)  Pain Rating Prior to Med Admin: 4 (11/13/2017 10:52 AM)  Steve Score: 10 (11/13/2017 11:24 AM)      "

## 2017-11-13 NOTE — TRANSFER OF CARE
"Anesthesia Transfer of Care Note    Patient: Jazmín Bishop    Procedure(s) Performed: Procedure(s) (LRB):  ERCP (N/A)    Patient location: PACU    Anesthesia Type: general    Transport from OR: Transported from OR on room air with adequate spontaneous ventilation    Post pain: adequate analgesia    Post assessment: no apparent anesthetic complications    Post vital signs: stable    Level of consciousness: awake, oriented and alert    Nausea/Vomiting: no nausea/vomiting    Complications: none    Transfer of care protocol was followed      Last vitals:   Visit Vitals  BP (!) 119/59   Pulse 108   Temp 36.7 °C (98 °F)   Resp 20   Ht 4' 10" (1.473 m)   Wt 39 kg (86 lb)   LMP  (LMP Unknown)   SpO2 98%   Breastfeeding? No   BMI 17.97 kg/m²     "

## 2017-11-24 LAB
ACID FAST MOD KINY STN SPEC: NORMAL
MYCOBACTERIUM SPEC QL CULT: NORMAL

## 2017-12-01 ENCOUNTER — TELEPHONE (OUTPATIENT)
Dept: ENDOSCOPY | Facility: HOSPITAL | Age: 82
End: 2017-12-01

## 2018-01-01 NOTE — NURSING
Spoke with Dr. Allen regarding diet, orders to start clear liquids and advance as tolerated.     1100: Pt tolerated food well and will advance to cardiac diet.    Mother

## 2018-01-05 ENCOUNTER — TELEPHONE (OUTPATIENT)
Dept: FAMILY MEDICINE | Facility: CLINIC | Age: 83
End: 2018-01-05

## 2018-01-05 NOTE — TELEPHONE ENCOUNTER
----- Message from Maritza Segovia sent at 1/5/2018  9:44 AM CST -----  Contact: [facility] [phone]  Formerly [facility], later [facility] message    Calling to advise resident passed away last night at 936pm

## 2021-08-17 NOTE — ED PROVIDER NOTES
Annual Physical Office Visit   8/17/2021    Subjective:  Chief Complaint   Patient presents with    Annual Exam    Discuss Medications     hydroxyzine     HPI:   Iker Huffman is a 64 y.o. female who presents to the clinic today for an annual exam.    Stress- going through a divorce. Increased stress and anxiety. Trouble sleeping. States she took hydroxyzine and this caused a hang over effect. States this caused her to be too drowsy. Daily anxiety and stress. Not interested in seeing psychology. Daily anxiety and dysphoric mood.  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 Review of Systems   Constitutional: Negative for chills, fatigue and fever. HENT: Negative for congestion, postnasal drip, sinus pain and sore throat. Respiratory: Negative for cough, shortness of breath and wheezing. Cardiovascular: Negative for chest pain, palpitations and leg swelling. Gastrointestinal: Negative for abdominal pain, blood in stool, constipation, diarrhea, nausea and vomiting. Genitourinary: Negative for dysuria, frequency, hematuria and urgency. Skin: Negative for pallor and rash. Neurological: Negative for dizziness, weakness, light-headedness, numbness and headaches. Psychiatric/Behavioral: Positive for sleep disturbance. Negative for dysphoric mood and suicidal ideas. The patient is nervous/anxious.          Stress     Allergies   Allergen Reactions    Codeine Nausea Only     Family History   Problem Relation Age of Onset    High Blood Pressure Father     Diabetes Father     Heart Disease Father     Colon Cancer Father     No Known Problems Mother     Colon Cancer Maternal Grandmother     Breast Cancer Neg Hx     Ovarian Cancer Neg Hx     Heart Attack Neg Hx     Stroke Neg Hx      Current Outpatient Rx   Medication Sig Dispense Refill    busPIRone (BUSPAR) 5 MG tablet Take 1 tablet by mouth 2 times daily 60 tablet 0    MAGNESIUM PO Take by mouth Encounter Date: 9/21/2017       History     Chief Complaint   Patient presents with    GI Bleeding     via EMS from [facility] with reports of a few episodes of coffee ground emesis today after lunch with fever 101.9. Room air O2 90%. AAO     The patient presents with abdominal pain and vomiting.  Per EMS, the nursing home reported 2 episodes of coffee-ground emesis.  Apparently there was a temperature of 101.9 at the nursing home.  Patient complains of abdominal pain for the past 2 days.  She reports some nausea and vomiting.  She denies diarrhea.  She states her pain is severe.    In the treatment room, the patient had a pulse ox of 94% just prior to being placed on oxygen.      The history is provided by the patient and the EMS personnel.     Review of patient's allergies indicates:   Allergen Reactions    Codeine sulfate Other (See Comments)     CONFUSION       Past Medical History:   Diagnosis Date    Arthritis     Cataract     CHF (congestive heart failure)     Chicken pox     Hypertension     Ulcer      Past Surgical History:   Procedure Laterality Date    CHOLECYSTECTOMY      EYE SURGERY      HYSTERECTOMY      Pylorplasty      SMALL INTESTINE SURGERY      SPINE SURGERY      x2    STOMACH SURGERY       Family History   Problem Relation Age of Onset    Kidney disease Mother     No Known Problems Father      Social History   Substance Use Topics    Smoking status: Never Smoker    Smokeless tobacco: Never Used    Alcohol use No     Review of Systems   Constitutional: Positive for chills and fever.   Respiratory: Negative for shortness of breath.    Cardiovascular: Negative for chest pain.   Gastrointestinal: Positive for abdominal pain, nausea and vomiting. Negative for diarrhea.   Genitourinary: Negative for dysuria.   Skin: Negative for pallor.   All other systems reviewed and are negative.      Physical Exam     Initial Vitals [09/21/17 1447]   BP Pulse Resp Temp SpO2   (!) 142/62 89 20  "(!)  98 %      MAP       88.67         Physical Exam    Nursing note and vitals reviewed.  Constitutional: She is not diaphoretic. She appears distressed.   Elderly, frail   HENT:   Dry mucous membranes   Eyes: EOM are normal. Pupils are equal, round, and reactive to light.   Neck: Normal range of motion.   Cardiovascular: Normal rate and intact distal pulses. Exam reveals gallop.    Irregular rhythm   Pulmonary/Chest: Breath sounds normal. No respiratory distress. She has no wheezes. She has no rhonchi. She has no rales. She exhibits no tenderness.   Abdominal: Soft. Bowel sounds are normal. She exhibits no distension. There is tenderness. There is rebound and guarding.   There is diffuse tenderness with rebound and guarding.   Genitourinary:   Genitourinary Comments: Scant amount of dark brown stool.  No gross blood.  No gross melena.   Musculoskeletal: Normal range of motion.   Neurological: She is alert. She has normal strength. No cranial nerve deficit or sensory deficit.   Skin: Skin is warm and dry. No rash noted. No erythema.   Psychiatric: She has a normal mood and affect.         ED Course   Central Line  Date/Time: 9/21/2017 4:03 PM  Performed by: KARINA GUARDADO  Consent Done: Emergent Situation  Time out: Immediately prior to procedure a "time out" was called to verify the correct patient, procedure, equipment, support staff and site/side marked as required.  Indications: med administration and vascular access  Anesthesia: local infiltration    Anesthesia:  Local Anesthetic: lidocaine 1% without epinephrine  Preparation: skin prepped with ChloraPrep  Skin prep agent dried: skin prep agent completely dried prior to procedure  Sterile barriers: all five maximum sterile barriers used - cap, mask, sterile gown, sterile gloves, and large sterile sheet  Hand hygiene: hand hygiene performed prior to central venous catheter insertion  Location details: right internal jugular  Catheter type: triple " Indications: octavio-magnesium liquid 150mg.  Misc Natural Products (SUPER GREENS PO) Take by mouth      NONFORMULARY drenamin for stress       Social History     Socioeconomic History    Marital status:      Spouse name: Not on file    Number of children: Not on file    Years of education: Not on file    Highest education level: Not on file   Occupational History    Not on file   Tobacco Use    Smoking status: Former Smoker     Packs/day: 0.50     Years: 10.00     Pack years: 5.00     Types: Cigarettes     Quit date: 1993     Years since quittin.6    Smokeless tobacco: Never Used   Vaping Use    Vaping Use: Never used   Substance and Sexual Activity    Alcohol use: No    Drug use: No    Sexual activity: Not on file   Other Topics Concern    Not on file   Social History Narrative     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 Social Determinants of Health     Financial Resource Strain: Low Risk     Difficulty of Paying Living Expenses: Not hard at all   Food Insecurity: No Food Insecurity    Worried About Running Out of Food in the Last Year: Never true    Chetan of Food in the Last Year: Never true   Transportation Needs: No Transportation Needs    Lack of Transportation (Medical): No    Lack of Transportation (Non-Medical):  No   Physical Activity:     Days of Exercise per Week:     Minutes of Exercise per Session:    Stress:     Feeling of Stress :    Social Connections:     Frequency of Communication with Friends and Family:     Frequency of Social Gatherings with Friends and Family:     Attends [employer] Services:     Active Member of Clubs or Organizations:     Attends Club or Organization Meetings:     Marital Status:    Intimate Partner Violence:     Fear of Current or Ex-Partner:     Emotionally Abused:     Physically Abused:     Sexually Abused:      Past Medical History:   Diagnosis Date    Anxiety     Vertigo 07/18/2018     Patient Active Problem List   Diagnosis    Muscle cramps      Wt Readings from Last 3 Encounters:   08/17/21 142 lb (64.4 kg)   08/17/21 140 lb (63.5 kg)   07/28/21 139 lb 6.4 oz (63.2 kg)     BP Readings from Last 3 Encounters:   08/17/21 120/72   07/28/21 120/62   09/23/20 108/72     The 10-year ASCVD risk score (Gabriela Dominguez et al., 2013) is: 2.5%    Values used to calculate the score:      Age: 64 years      Sex: Female      Is Non- : No      Diabetic: No      Tobacco smoker: No      Systolic Blood Pressure: 403 mmHg      Is BP treated: No      HDL Cholesterol: 46 mg/dL      Total Cholesterol: 200 mg/dL    Objective/Physical Exam:  /72   Pulse 52   Wt 142 lb (64.4 kg)   LMP 04/02/2011   SpO2 97%   BMI 22.24 kg/m²   Body mass index is 22.24 kg/m². Physical Exam  Vitals reviewed. Constitutional:       General: She is not in acute distress. Appearance: She is well-developed. She is not diaphoretic. HENT:      Head: Normocephalic and atraumatic. Right Ear: Tympanic membrane and external ear normal.      Left Ear: Tympanic membrane and external ear normal.   Eyes:      Pupils: Pupils are equal, round, and reactive to light. Cardiovascular:      Rate and Rhythm: Normal rate and regular rhythm. Pulmonary:      Effort: Pulmonary effort is normal. No respiratory distress. Breath sounds: Normal breath sounds. No wheezing or rales. Chest:      Chest wall: No tenderness. Abdominal:      General: Bowel sounds are normal. There is no distension. Palpations: Abdomen is soft. Tenderness: There is no abdominal tenderness. There is no guarding. Skin:     General: Skin is warm and dry. Neurological:      Mental Status: She is alert and oriented to person, place, and time.       Coordination: Coordination normal.   Psychiatric:         Mood and Affect: Mood normal.       Assessment and Plan:  Isis Reilly was seen today for annual exam and discuss lumen  Catheter size: 7.5 Fr  Catheter Length: 12cm    Ultrasound guidance: yes  Vessel Caliber: largeNeedle advanced into vessel with real time Ultrasound guidance.  Guidewire confirmed in vessel.  Sterile sheath used.  Number of attempts: 1  Complications: none  Post-procedure: line sutured,  chlorhexidine patch,  sterile dressing applied and blood return through all ports  Complications: No        Labs Reviewed   APTT - Abnormal; Notable for the following:        Result Value    aPTT 34.9 (*)     All other components within normal limits   B-TYPE NATRIURETIC PEPTIDE - Abnormal; Notable for the following:      (*)     All other components within normal limits   CBC W/ AUTO DIFFERENTIAL - Abnormal; Notable for the following:     RBC 3.32 (*)     Hemoglobin 10.4 (*)     Hematocrit 32.6 (*)     MCH 31.3 (*)     MCHC 31.9 (*)     RDW 15.3 (*)     Gran # 9.5 (*)     Lymph # 0.4 (*)     Gran% 90.4 (*)     Lymph% 4.0 (*)     All other components within normal limits   COMPREHENSIVE METABOLIC PANEL - Abnormal; Notable for the following:     Glucose 137 (*)     Calcium 8.6 (*)     Albumin 3.0 (*)     Total Bilirubin 2.7 (*)     Alkaline Phosphatase 403 (*)      (*)      (*)     All other components within normal limits   LIPASE - Abnormal; Notable for the following:     Lipase >1000 (*)     All other components within normal limits   PROTIME-INR - Abnormal; Notable for the following:     Prothrombin Time 35.3 (*)     INR 3.5 (*)     All other components within normal limits   PROCALCITONIN - Abnormal; Notable for the following:     Procalcitonin 0.59 (*)     All other components within normal limits   URINALYSIS - Abnormal; Notable for the following:     Specific Gravity, UA <=1.005 (*)     Occult Blood UA Trace (*)     Leukocytes, UA 1+ (*)     All other components within normal limits   B-TYPE NATRIURETIC PEPTIDE - Abnormal; Notable for the following:      (*)     All other components within  medications. Diagnoses and all orders for this visit:    Annual physical exam   - Had [facility] and [facility] in 1/2021- see care evereywhere. Recommended A1C, TSH and lipid panel. Pt would like to wait until Jan 2022 and repeat labs. - Feels safe in home and in relationships.   - Wears seatbelt in the car.   - Denies COVID-19 vaccine. Risks vs benefits reviewed. Screen for colon cancer   - FIT test ordered last visit. Patient agreeable to bring FIT test to the next visit    Encounter for screening mammogram for malignant neoplasm of breast   - Had mammogram today- no results yet. Stressful life event affecting family  -    Hydroxyzine caused side effects.  - Options were reviewed. Recommended BuSpar. Patient is agreeable with plan  - Not interested in seeing psychology  - busPIRone (BUSPAR) 5 MG tablet; Take 1 tablet by mouth 2 times daily - patient education handout provided and reviewed with the pt. - Patient will call symptoms worsen or fail to improve    Need for Tdap vaccination  -     Tdap (age 6y and older) IM ([address]) - patient education handout provided and reviewed with the pt. Return in about 4 weeks (around 9/14/2021) for mood f/u, or sooner if needed. Pt will call if symptoms worsen or fail to improve. All questions answered. Pt states no further questions or concerns at this time.    Electronically signed by: NIEVES Garcia - DONITA 08/17/21 normal limits   OCCULT BLOOD X 1, STOOL - Abnormal; Notable for the following:     Occult Blood Positive (*)     All other components within normal limits   CULTURE, BLOOD   CULTURE, BLOOD   CULTURE, URINE   LACTIC ACID, PLASMA   MAGNESIUM   PHOSPHORUS   TROPONIN I   TSH   URINALYSIS MICROSCOPIC   LACTIC ACID, PLASMA   CORTISOL, RANDOM     EKG Readings: (Independently Interpreted)   Technically poor study, A. fib, rate 113, no STEMI       X-Rays:   Independently Interpreted Readings:   Chest X-Ray: Increased lung markings, infection cannot be excluded.  Central line in place.     Medical Decision Making:   Initial Assessment:   The patient presents with reported coffee-ground emesis, fever and she has significant abdominal tenderness on exam.  I'm concerned about sepsis and mesenteric ischemia.  Her lungs are clear and she denies cough.  She has a history of CHF.  I will start IV fluid resuscitation.  I will give broad spectrum antibiotics.  I will obtain a CT of the abdomen.  I will also check lab and x-ray for other sources of fever.              Attending Attestation:         Attending Critical Care:   Critical Care Times:   ==============================================================  · Total Critical Care Time - exclusive of procedural time: 50 minutes.  ==============================================================      Attending ED Notes:   6:35 PM   The patient states she feels improved.  On repeat exam she still has diffuse abdominal tenderness.  I reviewed her CTA, and there is no evidence of acute ischemia.  Her lipase was greater than 1000.  She does have intrahepatic and biliary ductal dilatation, but she is status post cholecystectomy.  She has had choledocholithiasis in the past.  She will be admitted for continued IV fluids, IV antibiotics and GI consult and treatment for biliary pancreatitis.    9:24 PM   The patient had originally been admitted to the [facility] medicine service.  However,  apparently she is supposed to be admitted to the [facility] hospital service.  The [facility] medicine service has already spoken with the [facility] hospital service.  However, the patient is hypertensive.  On repeat exam she is alert and talkative.  She is requesting tramadol for back pain.  Her blood pressure is 65 systolic.  The [facility] service ordered additional IV fluids.  I've ordered norepinephrine.  I have paged the [facility] service.          ED Course      Clinical Impression:   The primary encounter diagnosis was Acute biliary pancreatitis, unspecified complication status. Diagnoses of Fever, unspecified fever cause and Severe sepsis were also pertinent to this visit.                           David Ordonez MD  09/21/17 2649

## 2024-11-15 NOTE — CONSULTS
[facility]  Gastroenterology  Consult Note    Patient Name: Jazmín Bishop  MRN: [id number]  Admission Date: 9/21/2017  Hospital Length of Stay: 1 days  Code Status: DNR   Attending Provider: Frank Noel MD   Consulting Provider: Elizabeth Allen MD  Primary Care Physician: [facility]  Principal Problem:Gram negative septic shock    Inpatient consult to [facility]  Consult performed by: EILZABETH ALLEN  Consult ordered by: NEGRITO LOZADA  Reason for consult: Biliary pancreatitis        Subjective:     HPI:  This is an 82-year-old female with a past medical history of choledocholithiasis, atrial fibrillation on chronic anticoagulation, hypertension who presents to the hospital with abdominal pain.  She noted one week of epigastric discomfort associated with some subjective fevers.  The pain is sharp, nonradiating and mild to moderate intensity.  Some nausea but only a few episodes of vomiting followed by some coffee-ground emesis.  She denies any additional NSAID usage.  No changes in bowel habits.  No diarrhea, melena.  No other exacerbating or relieving factors or other associated symptoms.  She is noted to be febrile.    Past Medical History:   Diagnosis Date    Arthritis     Cataract     CHF (congestive heart failure)     Chicken pox     Choledocholithiasis 05/16/2016    Hypertension     Ulcer        Past Surgical History:   Procedure Laterality Date    CHOLECYSTECTOMY      ERCP W/ SPHICTEROTOMY  05/16/2016    EYE SURGERY      HYSTERECTOMY      Pylorplasty      SMALL INTESTINE SURGERY      SPINE SURGERY      x2    STOMACH SURGERY         Review of patient's allergies indicates:   Allergen Reactions    Codeine sulfate Other (See Comments)     CONFUSION       Family History     Problem Relation (Age of Onset)    Kidney disease Mother    No Known Problems Father        Social History Main Topics    Smoking status: Never Smoker     Please take B6 or pyridoxine 25mg three times daily, will not make you sleepy.     Doxylamine or Unisom (over the counter) 12.5mg up to three times daily with B6 (but will make you sleepy).      Smokeless tobacco: Never Used    Alcohol use No    Drug use: No    Sexual activity: No     Review of Systems   Constitutional: Positive for appetite change and fever. Negative for chills.   HENT: Negative for postnasal drip and trouble swallowing.    Eyes: Negative for pain and redness.   Respiratory: Negative for cough, choking, chest tightness and shortness of breath.    Cardiovascular: Negative for chest pain and leg swelling.   Gastrointestinal: Positive for abdominal pain and nausea. Negative for abdominal distention, anal bleeding, blood in stool, constipation, diarrhea, rectal pain and vomiting.   Endocrine: Negative for cold intolerance and heat intolerance.   Genitourinary: Negative for difficulty urinating and hematuria.   Musculoskeletal: Positive for arthralgias. Negative for back pain.   Skin: Negative for color change and pallor.   Allergic/Immunologic: Negative for environmental allergies and food allergies.   Neurological: Negative for dizziness and light-headedness.   Hematological: Negative for adenopathy. Does not bruise/bleed easily.   Psychiatric/Behavioral: Negative for agitation and behavioral problems.     Objective:     Vital Signs (Most Recent):  Temp: 98.9 °F (37.2 °C) (09/22/17 1105)  Pulse: (!) 54 (09/22/17 1230)  Resp: (!) 36 (09/22/17 1230)  BP: 127/66 (09/22/17 1230)  SpO2: 100 % (09/22/17 1230) Vital Signs (24h Range):  Temp:  [97.5 °F (36.4 °C)-103.6 °F (39.8 °C)] 98.9 °F (37.2 °C)  Pulse:  [] 54  Resp:  [18-47] 36  SpO2:  [78 %-100 %] 100 %  BP: ()/(29-91) 127/66     Weight: 52.1 kg (114 lb 13.8 oz) (09/22/17 0330)  Body mass index is 24.01 kg/m².      Intake/Output Summary (Last 24 hours) at 09/22/17 1515  Last data filed at 09/22/17 1215   Gross per 24 hour   Intake          1578.39 ml   Output              455 ml   Net          1123.39 ml       Lines/Drains/Airways     Central Venous Catheter Line                 Percutaneous Central Line Insertion/Assessment -  single lumen  09/21/17 1647 right internal jugular less than 1 day          Drain                 Urethral Catheter 09/21/17 2335 Latex 14 Fr. less than 1 day                Physical Exam   Constitutional: She is oriented to person, place, and time. She appears well-developed and well-nourished. No distress.   HENT:   Head: Normocephalic and atraumatic.   Eyes: Conjunctivae are normal. Scleral icterus is present.   Neck: Normal range of motion. Neck supple. No tracheal deviation present. No thyromegaly present.   Cardiovascular: Normal rate and regular rhythm.  Exam reveals no gallop and no friction rub.    No murmur heard.  Pulmonary/Chest: Effort normal and breath sounds normal. No respiratory distress. She has no wheezes.   Abdominal: Soft. Bowel sounds are normal. She exhibits no distension. There is tenderness.   Musculoskeletal:        Right wrist: She exhibits normal range of motion and no tenderness.        Left wrist: She exhibits normal range of motion and no tenderness.   Lymphadenopathy:        Head (right side): No submental and no submandibular adenopathy present.        Head (left side): No submental and no submandibular adenopathy present.   Neurological: She is alert and oriented to person, place, and time.   Skin: Skin is warm and dry. No rash noted. She is not diaphoretic. No erythema.   Psychiatric: She has a normal mood and affect. Her behavior is normal.   Nursing note and vitals reviewed.      Significant Labs:  CBC:   Recent Labs  Lab 09/21/17  1608 09/22/17  0500 09/22/17  1114   WBC 10.54 26.18*  --    HGB 10.4* 10.1*  10.1* 9.3*   HCT 32.6* 31.4*  31.4* 30.4*    262  --      CMP:   Recent Labs  Lab 09/21/17  1608 09/22/17  0500   *  --    CALCIUM 8.6*  --    ALBUMIN 3.0* 2.6*   PROT 6.1 5.3*     --    K 3.8  --    CO2 26  --      --    BUN 17  --    CREATININE 0.7  --    ALKPHOS 403* 329*   * 88*   * 90*   BILITOT 2.7* 5.6*     Coagulation:    Recent Labs  Lab 09/21/17  1608 09/22/17  0500   INR 3.5* 2.1*   APTT 34.9*  --        Significant Imaging:  CT: I have reviewed all results within the past 24 hours and my personal findings are:  johnny dilation    Assessment/Plan:     * Gram negative septic shock    - suspect biliary pancreatitis with concern for cholangitis  - reviewed prior ERCP  - reverse coumadin  - PTC asap, discussed with Dr. Askew and appreciate his help  - monitor LFTs  - f/u GNR sensitivities   - continue IVF and abx            Thank you for your consult. I will follow-up with patient. Please contact us if you have any additional questions.    Elizabeth Allen MD  Gastroenterology  [facility]